# Patient Record
Sex: FEMALE | Race: WHITE | NOT HISPANIC OR LATINO | Employment: FULL TIME | URBAN - METROPOLITAN AREA
[De-identification: names, ages, dates, MRNs, and addresses within clinical notes are randomized per-mention and may not be internally consistent; named-entity substitution may affect disease eponyms.]

---

## 2017-02-01 DIAGNOSIS — F90.8 ATTENTION-DEFICIT HYPERACTIVITY DISORDER, OTHER TYPE: Primary | ICD-10-CM

## 2017-02-01 RX ORDER — DEXTROAMPHETAMINE SACCHARATE, AMPHETAMINE ASPARTATE MONOHYDRATE, DEXTROAMPHETAMINE SULFATE AND AMPHETAMINE SULFATE 6.25; 6.25; 6.25; 6.25 MG/1; MG/1; MG/1; MG/1
25 CAPSULE, EXTENDED RELEASE ORAL EVERY MORNING
Qty: 30 CAPSULE | Refills: 0 | Status: SHIPPED | OUTPATIENT
Start: 2017-02-01 | End: 2017-02-02

## 2017-02-01 NOTE — TELEPHONE ENCOUNTER
Pt calls requesting refill of Adderall.    Adderall 25 mg      Last Written Prescription Date:  12/27/16  Last Fill Quantity: 30,   # refills: 0  Last Office Visit with [facility] or  Health prescribing provider: 9/15/16  Future Office visit:       Routing refill request to provider for review/approval because:  Drug not on the [facility] or  Health refill protocol or controlled substance

## 2017-02-02 RX ORDER — DEXTROAMPHETAMINE SACCHARATE, AMPHETAMINE ASPARTATE MONOHYDRATE, DEXTROAMPHETAMINE SULFATE AND AMPHETAMINE SULFATE 6.25; 6.25; 6.25; 6.25 MG/1; MG/1; MG/1; MG/1
25 CAPSULE, EXTENDED RELEASE ORAL EVERY MORNING
Qty: 30 CAPSULE | Refills: 0 | Status: SHIPPED | OUTPATIENT
Start: 2017-02-02 | End: 2017-03-02

## 2017-02-02 NOTE — TELEPHONE ENCOUNTER
Unable to locate rx that was printed 2-1-17.  Dr. Bates re-printed one and will be hand carried to  pharm after 4pm today.    Pt informed.

## 2017-03-01 DIAGNOSIS — F90.8 ATTENTION-DEFICIT HYPERACTIVITY DISORDER, OTHER TYPE: ICD-10-CM

## 2017-03-01 NOTE — TELEPHONE ENCOUNTER
Reason for Call:  Medication or medication refill:    Do you use a [facility]?  Name of the pharmacy and phone number for the current request:  [facility] [address] ([place]) - [phone]    Name of the medication requested: adderal    Other request: Monday will need by Monday    Can we leave a detailed message on this number? YES    Phone number patient can be reached at: Home number on file [phone] (home)    Best Time: any    Call taken on 3/1/2017 at 10:00 AM by Sydnie Casiano

## 2017-03-02 RX ORDER — DEXTROAMPHETAMINE SACCHARATE, AMPHETAMINE ASPARTATE MONOHYDRATE, DEXTROAMPHETAMINE SULFATE AND AMPHETAMINE SULFATE 6.25; 6.25; 6.25; 6.25 MG/1; MG/1; MG/1; MG/1
25 CAPSULE, EXTENDED RELEASE ORAL EVERY MORNING
Qty: 30 CAPSULE | Refills: 0 | Status: SHIPPED | OUTPATIENT
Start: 2017-03-02 | End: 2017-03-30

## 2017-03-02 NOTE — TELEPHONE ENCOUNTER
Adderall      Last Written Prescription Date:  2-2-17  Last Fill Quantity: 30,   # refills: 0  Last Office Visit with [facility] or  Health prescribing provider: 9-15-16  Future Office visit:       Routing refill request to provider for review/approval because:  Drug not on the [facility], [facility] or [facility] refill protocol or controlled substance    Signed CSA form in chart.    [facility].    Please advise, thanks.

## 2017-03-02 NOTE — TELEPHONE ENCOUNTER
Pt calls, left vm checking on status of below.    Rx done.    Called pt, relay rx to [facility].    [facility].

## 2017-03-09 DIAGNOSIS — F41.8 DEPRESSION WITH ANXIETY: ICD-10-CM

## 2017-03-09 RX ORDER — VENLAFAXINE HYDROCHLORIDE 75 MG/1
225 CAPSULE, EXTENDED RELEASE ORAL DAILY
Qty: 270 CAPSULE | Refills: 0 | Status: SHIPPED | OUTPATIENT
Start: 2017-03-09 | End: 2017-06-05

## 2017-03-09 NOTE — TELEPHONE ENCOUNTER
Venlafaxine    Last Written Prescription Date: 9/15/16  Last Fill Quantity: 270, # refills: 1  Last Office Visit with [facility] or [facility] prescribing provider: 9/15/16        BP Readings from Last 3 Encounters:   09/29/16 100/80   09/15/16 108/78   05/15/16 132/83     Pulse: (for F[place]ima)  Creatinine   Date Value Ref Range Status   06/18/2015 0.64 0.52 - 1.04 mg/dL Final   ]    Last PHQ-9 score on record=   PHQ-9 SCORE 9/15/2016   Total Score -   Total Score 6         Labs showing if normal/abnormal  Data Unavailable  Lab Results   Component Value Date    AST 19 06/18/2015    ALT 31 06/18/2015      No results found for: CHOL, TRIG, HDL, LDL, VLDL, CHOLHDLRATIO

## 2017-03-09 NOTE — TELEPHONE ENCOUNTER
Pt callin back, updated PHQ-9. Score of 4.  Appt scheduled for 3/20/17 at 9:20am.    Medication is being filled for 1 time refill only due to:  Patient needs to be seen because due for 6 month follow up..

## 2017-03-10 ASSESSMENT — PATIENT HEALTH QUESTIONNAIRE - PHQ9: SUM OF ALL RESPONSES TO PHQ QUESTIONS 1-9: 4

## 2017-03-30 DIAGNOSIS — F90.8 ATTENTION-DEFICIT HYPERACTIVITY DISORDER, OTHER TYPE: ICD-10-CM

## 2017-03-30 RX ORDER — DEXTROAMPHETAMINE SACCHARATE, AMPHETAMINE ASPARTATE MONOHYDRATE, DEXTROAMPHETAMINE SULFATE AND AMPHETAMINE SULFATE 6.25; 6.25; 6.25; 6.25 MG/1; MG/1; MG/1; MG/1
25 CAPSULE, EXTENDED RELEASE ORAL EVERY MORNING
Qty: 30 CAPSULE | Refills: 0 | Status: SHIPPED | OUTPATIENT
Start: 2017-03-30 | End: 2017-04-27

## 2017-03-30 NOTE — TELEPHONE ENCOUNTER
Adderall      Last Written Prescription Date:  3-2-17  Last Fill Quantity: 30,   # refills: 0  Last Office Visit with [facility] or  Health prescribing provider: 9-29-16  Future Office visit:       Routing refill request to provider for review/approval because:  Drug not on the [facility], [facility] or [facility] refill protocol or controlled substance    Signed CSA form in chart.    Please [facility] to [facility].    Please advise, thanks.

## 2017-03-30 NOTE — TELEPHONE ENCOUNTER
Reason for Call:  Medication or medication refill:    Do you use a [facility]?  Name of the pharmacy and phone number for the current request:  [facility] [address] ([place]) - [phone]    Name of the medication requested: adderall    Other request: none    Can we leave a detailed message on this number? YES    Phone number patient can be reached at: Home number on file [phone] (home)    Best Time: any    Call taken on 3/30/2017 at 11:27 AM by Nano Esparza

## 2017-04-27 DIAGNOSIS — F90.8 ATTENTION-DEFICIT HYPERACTIVITY DISORDER, OTHER TYPE: ICD-10-CM

## 2017-04-27 NOTE — TELEPHONE ENCOUNTER
Reason for Call:  Medication or medication refill:    Do you use a [facility]?  Name of the pharmacy and phone number for the current request:  [facility] [address] ([place]) - [phone]    Name of the medication requested: adderal    Other request: none    Can we leave a detailed message on this number? YES    Phone number patient can be reached at: Home number on file [phone] (home)    Best Time: any    Call taken on 4/27/2017 at 2:41 PM by Sydnie Casiano

## 2017-04-28 RX ORDER — DEXTROAMPHETAMINE SACCHARATE, AMPHETAMINE ASPARTATE MONOHYDRATE, DEXTROAMPHETAMINE SULFATE AND AMPHETAMINE SULFATE 6.25; 6.25; 6.25; 6.25 MG/1; MG/1; MG/1; MG/1
25 CAPSULE, EXTENDED RELEASE ORAL EVERY MORNING
Qty: 30 CAPSULE | Refills: 0 | Status: SHIPPED | OUTPATIENT
Start: 2017-04-28 | End: 2017-05-30

## 2017-05-01 NOTE — TELEPHONE ENCOUNTER
Pt calls to check if refill has been taken to the pharmacy. Informed pt prescription was taken to the pharmacy on Friday.

## 2017-05-04 ENCOUNTER — HOSPITAL ENCOUNTER (EMERGENCY)
Facility: CLINIC | Age: 27
Discharge: HOME | End: 2017-05-04
Attending: EMERGENCY MEDICINE | Admitting: EMERGENCY MEDICINE
Payer: COMMERCIAL

## 2017-05-04 ENCOUNTER — APPOINTMENT (OUTPATIENT)
Dept: GENERAL RADIOLOGY | Facility: CLINIC | Age: 27
End: 2017-05-04
Attending: EMERGENCY MEDICINE
Payer: COMMERCIAL

## 2017-05-04 VITALS
SYSTOLIC BLOOD PRESSURE: 130 MMHG | TEMPERATURE: 97.7 F | HEIGHT: 67 IN | HEART RATE: 114 BPM | RESPIRATION RATE: 16 BRPM | OXYGEN SATURATION: 100 % | DIASTOLIC BLOOD PRESSURE: 88 MMHG | BODY MASS INDEX: 25.11 KG/M2 | WEIGHT: 160 LBS

## 2017-05-04 DIAGNOSIS — F45.8 HYPERVENTILATION SYNDROME: ICD-10-CM

## 2017-05-04 DIAGNOSIS — F41.0 PANIC ATTACK: ICD-10-CM

## 2017-05-04 LAB
ANION GAP SERPL CALCULATED.3IONS-SCNC: 7 MMOL/L (ref 3–14)
BASOPHILS # BLD AUTO: 0 10E9/L (ref 0–0.2)
BASOPHILS NFR BLD AUTO: 0.4 %
BUN SERPL-MCNC: 11 MG/DL (ref 7–30)
CALCIUM SERPL-MCNC: 8.8 MG/DL (ref 8.5–10.1)
CHLORIDE SERPL-SCNC: 104 MMOL/L (ref 94–109)
CO2 SERPL-SCNC: 27 MMOL/L (ref 20–32)
CREAT SERPL-MCNC: 0.68 MG/DL (ref 0.52–1.04)
D DIMER PPP FEU-MCNC: 0.4 UG/ML FEU (ref 0–0.5)
DIFFERENTIAL METHOD BLD: NORMAL
EOSINOPHIL # BLD AUTO: 0.3 10E9/L (ref 0–0.7)
EOSINOPHIL NFR BLD AUTO: 3.5 %
ERYTHROCYTE [DISTWIDTH] IN BLOOD BY AUTOMATED COUNT: 12.4 % (ref 10–15)
GFR SERPL CREATININE-BSD FRML MDRD: NORMAL ML/MIN/1.7M2
GLUCOSE SERPL-MCNC: 94 MG/DL (ref 70–99)
HCG SERPL QL: NEGATIVE
HCT VFR BLD AUTO: 42.5 % (ref 35–47)
HGB BLD-MCNC: 15.1 G/DL (ref 11.7–15.7)
IMM GRANULOCYTES # BLD: 0 10E9/L (ref 0–0.4)
IMM GRANULOCYTES NFR BLD: 0.3 %
INTERPRETATION ECG - MUSE: NORMAL
LYMPHOCYTES # BLD AUTO: 1.2 10E9/L (ref 0.8–5.3)
LYMPHOCYTES NFR BLD AUTO: 16.4 %
MCH RBC QN AUTO: 32.6 PG (ref 26.5–33)
MCHC RBC AUTO-ENTMCNC: 35.5 G/DL (ref 31.5–36.5)
MCV RBC AUTO: 92 FL (ref 78–100)
MONOCYTES # BLD AUTO: 0.5 10E9/L (ref 0–1.3)
MONOCYTES NFR BLD AUTO: 6.6 %
NEUTROPHILS # BLD AUTO: 5.4 10E9/L (ref 1.6–8.3)
NEUTROPHILS NFR BLD AUTO: 72.8 %
NRBC # BLD AUTO: 0 10*3/UL
NRBC BLD AUTO-RTO: 0 /100
PLATELET # BLD AUTO: 227 10E9/L (ref 150–450)
POTASSIUM SERPL-SCNC: 3.7 MMOL/L (ref 3.4–5.3)
RBC # BLD AUTO: 4.63 10E12/L (ref 3.8–5.2)
SODIUM SERPL-SCNC: 138 MMOL/L (ref 133–144)
TROPONIN I SERPL-MCNC: NORMAL UG/L (ref 0–0.04)
WBC # BLD AUTO: 7.5 10E9/L (ref 4–11)

## 2017-05-04 PROCEDURE — 93005 ELECTROCARDIOGRAM TRACING: CPT

## 2017-05-04 PROCEDURE — 80048 BASIC METABOLIC PNL TOTAL CA: CPT | Performed by: EMERGENCY MEDICINE

## 2017-05-04 PROCEDURE — 99285 EMERGENCY DEPT VISIT HI MDM: CPT | Mod: 25

## 2017-05-04 PROCEDURE — 85379 FIBRIN DEGRADATION QUANT: CPT | Performed by: EMERGENCY MEDICINE

## 2017-05-04 PROCEDURE — 84484 ASSAY OF TROPONIN QUANT: CPT | Performed by: EMERGENCY MEDICINE

## 2017-05-04 PROCEDURE — 84703 CHORIONIC GONADOTROPIN ASSAY: CPT | Performed by: EMERGENCY MEDICINE

## 2017-05-04 PROCEDURE — 85025 COMPLETE CBC W/AUTO DIFF WBC: CPT | Performed by: EMERGENCY MEDICINE

## 2017-05-04 PROCEDURE — 71020 XR CHEST 2 VW: CPT

## 2017-05-04 ASSESSMENT — ENCOUNTER SYMPTOMS
DIAPHORESIS: 1
NUMBNESS: 1
VOMITING: 0
NAUSEA: 1
NERVOUS/ANXIOUS: 1
SHORTNESS OF BREATH: 1

## 2017-05-04 NOTE — ED AVS SNAPSHOT
Emergency Department    [address] [place] [address]    Phone:  [phone]    Fax:  [phone]                                       Tessa Barajas   MRN: [id number]    Department:   Emergency Department   Date of Visit:  5/4/2017           After Visit Summary Signature Page     I have received my discharge instructions, and my questions have been answered. I have discussed any challenges I see with this plan with the nurse or doctor.    ..........................................................................................................................................  Patient/Patient Representative Signature      ..........................................................................................................................................  Patient Representative Print Name and Relationship to Patient    ..................................................               ................................................  Date                                            Time    ..........................................................................................................................................  Reviewed by Signature/Title    ...................................................              ..............................................  Date                                                            Time

## 2017-05-04 NOTE — DISCHARGE INSTRUCTIONS

## 2017-05-04 NOTE — ED AVS SNAPSHOT
Emergency Department    [address] [facility] [address]    Phone:  [phone]    Fax:  [phone]                                       Tessa Barajas   MRN: [id number]    Department:   Emergency Department   Date of Visit:  5/4/2017           Patient Information     Date Of Birth          1990        Your diagnoses for this visit were:     Panic attack     Hyperventilation syndrome        You were seen by Zack Tong MD.      Follow-up Information     Schedule an appointment as soon as possible for a visit with Hemalatha Nino MD.    Specialty:  Internal Medicine    Contact information:    [facility]  [address]  [facility] [address] [phone]          Follow up with  Emergency Department.    Specialty:  EMERGENCY MEDICINE    Why:  If symptoms worsen    Contact information:    [address] [facility] [id number] [phone]        Discharge Instructions         Anxiety Reaction  Anxiety is the feeling we all get when we think something bad might happen. It is a normal response to stress and usually causes only a mild reaction. When anxiety becomes more severe, it can interfere with daily life. In some cases, you may not even be aware of what it is you re anxious about. There may also be a genetic link or it may be a learned behavior in the home.  Both psychological and physical triggers cause stress reaction. It's often a response to fear or emotional stress, real or imagined. This stress may come from home, family, work, or social relationships.  During an anxiety reaction, you may feel:    Helpless    Nervous    Depressed    Irritable  Your body may show signs of anxiety in many ways. You may experience:    Dry mouth    Shakiness    Dizziness    Weakness    Trouble breathing    Breathing fast (hyperventilating)    Chest pressure    Sweating    Headache    Nausea    Diarrhea    Tiredness    Inability to sleep    Sexual  problems  Home care    Try to locate the sources of stress in your life. They may not be obvious. These may include:    Daily hassles of life (traffic jams, missed appointments, car troubles, etc.)    Major life changes, both good (new baby, job promotion) and bad (loss of job, loss of loved one)    Overload: feeling that you have too many responsibilities and can't take care of all of them at once    Feeling helpless, feeling that your problems are beyond what you re able to solve    Notice how your body reacts to stress. Learn to listen to your body signals. This will help you take action before the stress becomes severe.    When you can, do something about the source of your stress. (Avoid hassles, limit the amount of change that happens in your life at one time and take a break when you feel overloaded).    Unfortunately, many stressful situations can't be avoided. It is necessary to learn how to better manage stress. There are many proven methods that will reduce your anxiety. These include simple things like exercise, good nutrition and adequate rest. Also, there are certain techniques that are helpful:    Relaxation    Breathing exercises    Visualization    Biofeedback    Meditation  For more information about this, consult your doctor or go to a local bookstore and review the many books and tapes available on this subject.  Follow-up care  If you feel that your anxiety is not responding to self-help measures, contact your doctor or make an appointment with a counselor. You may need short-term psychological counseling and temporary medicine to help you manage stress.  Call 911  Call your healthcare provider right away if any of these occur:    Trouble breathing    Confusion    Drowsiness or trouble wakening    Fainting or loss of consciousness    Rapid heart rate    Seizure    New chest pain that becomes more severe, lasts longer, or spreads into your shoulder, arm, neck, jaw, or back  When to seek medical  advice  Call your healthcare provider right away if any of these occur:    Your symptoms get worse    Severe headache not relieved by rest and mild pain reliever    0291-4817 [employer]. [address] [place] [address], [place] [address]. All rights reserved. This information is not intended as a substitute for professional medical care. Always follow your healthcare professional's instructions.          Discharge References/Attachments     HYPERVENTILATION SYNDROME (ENGLISH)      24 Hour Appointment Hotline       To make an appointment at [facility], call [phone] ([phone]). If you don't have a family doctor or clinic, we will help you find one. [facility] are conveniently located to serve the needs of you and your family.             Review of your medicines      Our records show that you are taking the medicines listed below. If these are incorrect, please call your family doctor or clinic.        Dose / Directions Last dose taken    amphetamine-dextroamphetamine 25 MG 24 hr capsule   Commonly known as:  ADDERALL XR   Dose:  25 mg   Quantity:  30 capsule        Take 1 capsule (25 mg) by mouth every morning   Refills:  0        MIRENA (52 MG) 20 MCG/24HR IUD   Dose:  1 each   Generic drug:  levonorgestrel        1 each by Intrauterine route once.   Refills:  0        venlafaxine 75 MG 24 hr capsule   Commonly known as:  EFFEXOR-XR   Dose:  225 mg   Quantity:  270 capsule        Take 3 capsules (225 mg) by mouth daily   Refills:  0                Procedures and tests performed during your visit     Basic metabolic panel    CBC with platelets differential    D dimer quantitative    HCG qualitative    Peripheral IV catheter    Troponin I    XR Chest 2 Views      Orders Needing Specimen Collection     None      Pending Results     Date and Time Order Name Status Description    5/4/2017 0946 XR Chest 2 Views Preliminary             Pending Culture Results     No orders found from  5/2/2017 to 5/5/2017.            Pending Results Instructions     If you had any lab results that were not finalized at the time of your Discharge, you can call the ED Lab Result RN at [phone]. You will be contacted by this team for any positive Lab results or changes in treatment. The nurses are available 7 days a week from 10A to 6:30P.  You can leave a message 24 hours per day and they will return your call.        Test Results From Your Hospital Stay        5/4/2017 10:08 AM      Component Results     Component Value Ref Range & Units Status    WBC 7.5 4.0 - 11.0 10e9/L Final    RBC Count 4.63 3.8 - 5.2 10e12/L Final    Hemoglobin 15.1 11.7 - 15.7 g/dL Final    Hematocrit 42.5 35.0 - 47.0 % Final    MCV 92 78 - 100 fl Final    MCH 32.6 26.5 - 33.0 pg Final    MCHC 35.5 31.5 - 36.5 g/dL Final    RDW 12.4 10.0 - 15.0 % Final    Platelet Count 227 150 - 450 10e9/L Final    Diff Method Automated Method  Final    % Neutrophils 72.8 % Final    % Lymphocytes 16.4 % Final    % Monocytes 6.6 % Final    % Eosinophils 3.5 % Final    % Basophils 0.4 % Final    % Immature Granulocytes 0.3 % Final    Nucleated RBCs 0 0 /100 Final    Absolute Neutrophil 5.4 1.6 - 8.3 10e9/L Final    Absolute Lymphocytes 1.2 0.8 - 5.3 10e9/L Final    Absolute Monocytes 0.5 0.0 - 1.3 10e9/L Final    Absolute Eosinophils 0.3 0.0 - 0.7 10e9/L Final    Absolute Basophils 0.0 0.0 - 0.2 10e9/L Final    Abs Immature Granulocytes 0.0 0 - 0.4 10e9/L Final    Absolute Nucleated RBC 0.0  Final         5/4/2017 10:21 AM      Component Results     Component Value Ref Range & Units Status    D Dimer 0.4 0.0 - 0.50 ug/ml FEU Final    This D-dimer assay is intended for use in conjuntion with a clinical pretest   probability assessment model to exclude pulmonary embolism (PE) and as an aid   in the diagnosis of deep venous thrombosis (DVT) in outpatients suspected of   PE   or DVT. The cut-off value is 0.5 g/mL FEU.           5/4/2017 10:23 AM       Component Results     Component Value Ref Range & Units Status    Sodium 138 133 - 144 mmol/L Final    Potassium 3.7 3.4 - 5.3 mmol/L Final    Chloride 104 94 - 109 mmol/L Final    Carbon Dioxide 27 20 - 32 mmol/L Final    Anion Gap 7 3 - 14 mmol/L Final    Glucose 94 70 - 99 mg/dL Final    Urea Nitrogen 11 7 - 30 mg/dL Final    Creatinine 0.68 0.52 - 1.04 mg/dL Final    GFR Estimate >90  Non  GFR Calc   >60 mL/min/1.7m2 Final    GFR Estimate If Black >90   GFR Calc   >60 mL/min/1.7m2 Final    Calcium 8.8 8.5 - 10.1 mg/dL Final         5/4/2017 10:28 AM      Component Results     Component Value Ref Range & Units Status    Troponin I ES  0.000 - 0.045 ug/L Final    <0.015  The 99th percentile for upper reference range is 0.045 ug/L.  Troponin values in   the range of 0.045 - 0.120 ug/L may be associated with risks of adverse   clinical events.           5/4/2017 10:25 AM      Component Results     Component Value Ref Range & Units Status    HCG Qualitative Serum Negative NEG Final         5/4/2017 10:20 AM      Narrative     CHEST TWO VIEWS  5/4/2017 10:01 AM      HISTORY:  Chest pain.     COMPARISON: None.    FINDINGS: The heart size is normal. The lungs are clear. No  pneumothorax or pleural effusion.         Impression     IMPRESSION:  No acute abnormality.                Clinical Quality Measure: Blood Pressure Screening     Your blood pressure was checked while you were in the emergency department today. The last reading we obtained was  BP: (!) 135/96 . Please read the guidelines below about what these numbers mean and what you should do about them.  If your systolic blood pressure (the top number) is less than 120 and your diastolic blood pressure (the bottom number) is less than 80, then your blood pressure is normal. There is nothing more that you need to do about it.  If your systolic blood pressure (the top number) is 120-139 or your diastolic blood pressure (the bottom  "number) is 80-89, your blood pressure may be higher than it should be. You should have your blood pressure rechecked within a year by a primary care provider.  If your systolic blood pressure (the top number) is 140 or greater or your diastolic blood pressure (the bottom number) is 90 or greater, you may have high blood pressure. High blood pressure is treatable, but if left untreated over time it can put you at risk for heart attack, stroke, or kidney failure. You should have your blood pressure rechecked by a primary care provider within the next 4 weeks.  If your provider in the emergency department today gave you specific instructions to follow-up with your doctor or provider even sooner than that, you should follow that instruction and not wait for up to 4 weeks for your follow-up visit.        Thank you for choosing [facility]       Thank you for choosing [facility] for your care. Our goal is always to provide you with excellent care. Hearing back from our patients is one way we can continue to improve our services. Please take a few minutes to complete the written survey that you may receive in the mail after you visit with us. Thank you!        [facility] Information     [facility] lets you send messages to your doctor, view your test results, renew your prescriptions, schedule appointments and more. To sign up, go to [email or web address] . Click on \"Log in\" on the left side of the screen, which will take you to the Welcome page. Then click on \"Sign up Now\" on the right side of the page.     You will be asked to enter the access code listed below, as well as some personal information. Please follow the directions to create your username and password.     Your access code is: [email or web address]  Expires: 2017 10:51 AM     Your access code will  in 90 days. If you need help or a new code, please call your [facility] clinic or [phone].        Care EveryWhere ID     This is your Care EveryWhere ID. This could be " used by other organizations to access your [facility] medical records  [id number]        After Visit Summary       This is your record. Keep this with you and show to your community pharmacist(s) and doctor(s) at your next visit.

## 2017-05-04 NOTE — ED PROVIDER NOTES
"  History     Chief Complaint:  Shortness of Breath    HPI   Tessa Barajas is a 26 year old female with a history of anxiety who presents with concern for shortness of breath. The patient reports that while at the gas station today she suddenly began feeling hot, sweaty, nauseous, and short of breath and developed numbness and tingling in her bilateral hands and feet. She felt like her hands and feet were locking up and she became markedly anxious because she did not know what was happening. Here, she reports that her symptoms have significantly improved but she is still feeling somewhat shaky and tingly. She endorses experiencing similar symptoms a couple of years ago for which she was seen in an emergency department and \"they didn't really do anything.\" The patient denies any chest pain or vomiting.     Cardiac Risk Factors:  CAD:    Neg  Hypertension:   Neg  Hyperlipidemia:  Pos  Diabetes:   Neg  Tobacco use:   Pos  Gender:   F  Age:    26  Familial Hx of CAD:  Neg    PE/DVT Risk Factors:   Hx of PE/DVT:   Neg  Hx of clotting disorder:  Neg  Hx of cancer:    Neg  Tobacco use:    Pos  Hormone therapy:   Pos  Pregnancy:    Neg  Prolonged immobilization:  Neg  Recent surgery:   Neg  Recent travel:    Neg  Familial Hx of PE/DVT:  Neg      Allergies:  Amoxicillin  Cefzil     Medications:    Effexor  Adderall  Mirena IUD    Past Medical History:    Headache disorder  ADHD  Depression with anxiety  HLD    Past Surgical History:    Abdomen surgery (had quarter removed)  Tooth extraction    Family History:    History reviewed.  No significant family history.      Social History:  Relationship status: Single  Tobacco use: Negative  Alcohol use: Positive (occasionally, less than once per week)  The patient presents with her mother.     Review of Systems   Constitutional: Positive for diaphoresis.   Respiratory: Positive for shortness of breath.    Cardiovascular: Negative for chest pain.   Gastrointestinal: Positive for " "nausea. Negative for vomiting.   Neurological: Positive for numbness.        Positive for paresthesias.    Psychiatric/Behavioral: The patient is nervous/anxious.    All other systems reviewed and are negative.    Physical Exam   First Vitals:  BP: (!) 135/96  Pulse: 114  Temp: 97.7  F (36.5  C)  Resp: 20  Height: 170.2 cm (5' 7\")  Weight: 72.6 kg (160 lb)  SpO2: 100 %    Physical Exam  Nursing note and vitals reviewed.  Constitutional:  Oriented to person, place, and time. Cooperative.   HENT:   Nose:    Nose normal.   Mouth/Throat:   Mucous membranes are normal.   Eyes:    Conjunctivae normal and EOM are normal.      Pupils are equal, round, and reactive to light.   Neck:    Trachea normal.   Cardiovascular:  Tachycardic rate, regular rhythm, normal heart sounds and normal pulses.      No murmur heard.  Pulmonary/Chest:  Effort normal and breath sounds normal.   Abdominal:   Soft. Normal appearance and bowel sounds are normal.      There is no tenderness.      There is no rebound and no CVA tenderness.   Musculoskeletal:  Extremities atraumatic x 4.   Lymphadenopathy:  No cervical adenopathy.   Neurological:   Alert and oriented to person, place, and time. Normal strength.      No cranial nerve deficit or sensory deficit. GCS eye subscore is 4. GCS verbal      subscore is 5. GCS motor subscore is 6.   Skin:    Skin is intact. No rash noted.   Psychiatric:   Anxious.     Emergency Department Course   ECG (10:06:40):  Indication: Shortness of breath  Rate 105 bpm. IN interval 146. QRS duration 92. QT/QTc 356/470. P-R-T axes 56.   Interpretation: Sinus tachycardia, Nonspecific T wave abnormality, Abnormal ECG.  Agree with computer interpretation.   Interpreted at 1042 by Dr. Tong.      Imaging:  Radiographic findings were communicated with the patient who voiced understanding of the findings.    Chest XR, per radiology:  No acute abnormality.     Laboratory:  CBC: WNL (WBC 7.5, HGB 15.1, )  BMP: WNL " (Creatinine 0.68)  HCG Qualitative serum: Negative  D dimer: 0.4  0955: Troponin I: <0.015     Emergency Department Course:  Nursing notes and vitals reviewed.  I performed an exam of the patient as documented above.  The above workup was undertaken.  1053: I rechecked the patient and discussed results.  Findings and plan explained to the Patient and mother. Patient discharged home, status improved, with instructions regarding supportive care, medications, and reasons to return as well as the importance of close follow-up was reviewed.     Impression & Plan    Medical Decision Making:  Tessa Barajas is a 26 year old female who came in for what sounds like a hyperventilation syndrome and panic attack. Her symptoms were very classic for hyperventilation. I felt it was reasonable though to proceed with the above workup to make sure she did not have anything more serious and specifically a pulmonary embolism. I was concerned due to the fact that she is on birth control and smokes. However, she has no other risk factors and has essentially a normal exam here other than being slightly tachycardic and anxious. Her workup here is unremarkable, and therefore I think she is safe for discharge and outpatient management. I recommended she follow up with her primary MD and return with any concerns or worsening symptoms.     Diagnosis:    ICD-10-CM   1. Panic attack F41.0   2. Hyperventilation syndrome F45.8     Disposition:  Discharge to home with primary care follow up.       INeva, am serving as a scribe on 5/4/2017 at 9:41 AM to personally document services performed by Zack Tong MD, based on my observations and the provider's statements to me.     EMERGENCY DEPARTMENT     Zack Tong MD  05/04/17 2040

## 2017-05-30 ENCOUNTER — TELEPHONE (OUTPATIENT)
Dept: INTERNAL MEDICINE | Facility: CLINIC | Age: 27
End: 2017-05-30

## 2017-05-30 DIAGNOSIS — F90.8 ATTENTION-DEFICIT HYPERACTIVITY DISORDER, OTHER TYPE: ICD-10-CM

## 2017-05-30 RX ORDER — DEXTROAMPHETAMINE SACCHARATE, AMPHETAMINE ASPARTATE MONOHYDRATE, DEXTROAMPHETAMINE SULFATE AND AMPHETAMINE SULFATE 6.25; 6.25; 6.25; 6.25 MG/1; MG/1; MG/1; MG/1
25 CAPSULE, EXTENDED RELEASE ORAL EVERY MORNING
Qty: 30 CAPSULE | Refills: 0 | Status: SHIPPED | OUTPATIENT
Start: 2017-05-30 | End: 2017-06-27

## 2017-05-30 NOTE — TELEPHONE ENCOUNTER
Reason for Call:  Medication or medication refill:    Do you use a [facility]?  Name of the pharmacy and phone number for the current request:  [facility] [address] ([place]) - [phone]    Name of the medication requested: adderall    Other request: none    Can we leave a detailed message on this number? YES    Phone number patient can be reached at: Home number on file [phone] (home)    Best Time: any    Call taken on 5/30/2017 at 8:09 AM by Sydnie Casiano

## 2017-05-30 NOTE — TELEPHONE ENCOUNTER
Contacted pt, informed her she is due for appt. Appt scheduled for 6/16.      Adderall XR 25mg      Last Written Prescription Date:  4/28/17  Last Fill Quantity: 30,   # refills: 0  Last [facility] Visit with [facility] prescribing provider: 9/29/16  Future Office visit:     Next 5 appointments (look out 90 days)     Jun 16, 2017  4:00 PM CDT   Office Visit with Hemalatha Nino MD   [facility] ([facility])    [address]  [facility] [id number] [phone]                   CSA on file.   Walk to [facility], notify pt when dropped off.     Routing refill request to provider for review/approval because:  Drug not on the [facility] refill protocol or controlled substance

## 2017-06-05 DIAGNOSIS — F41.8 DEPRESSION WITH ANXIETY: ICD-10-CM

## 2017-06-05 RX ORDER — VENLAFAXINE HYDROCHLORIDE 75 MG/1
225 CAPSULE, EXTENDED RELEASE ORAL DAILY
Qty: 90 CAPSULE | Refills: 0 | Status: SHIPPED | OUTPATIENT
Start: 2017-06-05 | End: 2017-06-27

## 2017-06-05 NOTE — TELEPHONE ENCOUNTER
Pt called. Stated she is sched to see Trung on 6/16, but is out of her Venlafaxine. Needs refill to get her thru til appt      Rx for 1mo sent to pharm

## 2017-06-27 ENCOUNTER — OFFICE VISIT (OUTPATIENT)
Dept: INTERNAL MEDICINE | Facility: CLINIC | Age: 27
End: 2017-06-27
Payer: COMMERCIAL

## 2017-06-27 VITALS
BODY MASS INDEX: 26.1 KG/M2 | HEIGHT: 67 IN | DIASTOLIC BLOOD PRESSURE: 70 MMHG | OXYGEN SATURATION: 100 % | SYSTOLIC BLOOD PRESSURE: 122 MMHG | WEIGHT: 166.3 LBS | TEMPERATURE: 98.3 F | HEART RATE: 71 BPM

## 2017-06-27 DIAGNOSIS — F41.8 DEPRESSION WITH ANXIETY: ICD-10-CM

## 2017-06-27 DIAGNOSIS — F90.8 ATTENTION-DEFICIT HYPERACTIVITY DISORDER, OTHER TYPE: ICD-10-CM

## 2017-06-27 PROCEDURE — 99213 OFFICE O/P EST LOW 20 MIN: CPT | Performed by: FAMILY MEDICINE

## 2017-06-27 RX ORDER — VENLAFAXINE HYDROCHLORIDE 75 MG/1
225 CAPSULE, EXTENDED RELEASE ORAL DAILY
Qty: 90 CAPSULE | Refills: 5 | Status: SHIPPED | OUTPATIENT
Start: 2017-06-27 | End: 2017-08-07

## 2017-06-27 RX ORDER — DEXTROAMPHETAMINE SACCHARATE, AMPHETAMINE ASPARTATE MONOHYDRATE, DEXTROAMPHETAMINE SULFATE AND AMPHETAMINE SULFATE 6.25; 6.25; 6.25; 6.25 MG/1; MG/1; MG/1; MG/1
25 CAPSULE, EXTENDED RELEASE ORAL EVERY MORNING
Qty: 30 CAPSULE | Refills: 0 | Status: SHIPPED | OUTPATIENT
Start: 2017-06-27 | End: 2017-06-27

## 2017-06-27 RX ORDER — DEXTROAMPHETAMINE SACCHARATE, AMPHETAMINE ASPARTATE MONOHYDRATE, DEXTROAMPHETAMINE SULFATE AND AMPHETAMINE SULFATE 5; 5; 5; 5 MG/1; MG/1; MG/1; MG/1
20 CAPSULE, EXTENDED RELEASE ORAL DAILY
Qty: 30 CAPSULE | Refills: 0 | Status: SHIPPED | OUTPATIENT
Start: 2017-06-27 | End: 2017-07-26

## 2017-06-27 ASSESSMENT — ANXIETY QUESTIONNAIRES
3. WORRYING TOO MUCH ABOUT DIFFERENT THINGS: MORE THAN HALF THE DAYS
7. FEELING AFRAID AS IF SOMETHING AWFUL MIGHT HAPPEN: SEVERAL DAYS
1. FEELING NERVOUS, ANXIOUS, OR ON EDGE: SEVERAL DAYS
GAD7 TOTAL SCORE: 10
2. NOT BEING ABLE TO STOP OR CONTROL WORRYING: MORE THAN HALF THE DAYS
IF YOU CHECKED OFF ANY PROBLEMS ON THIS QUESTIONNAIRE, HOW DIFFICULT HAVE THESE PROBLEMS MADE IT FOR YOU TO DO YOUR WORK, TAKE CARE OF THINGS AT HOME, OR GET ALONG WITH OTHER PEOPLE: SOMEWHAT DIFFICULT
5. BEING SO RESTLESS THAT IT IS HARD TO SIT STILL: SEVERAL DAYS
6. BECOMING EASILY ANNOYED OR IRRITABLE: MORE THAN HALF THE DAYS

## 2017-06-27 ASSESSMENT — PATIENT HEALTH QUESTIONNAIRE - PHQ9: 5. POOR APPETITE OR OVEREATING: SEVERAL DAYS

## 2017-06-27 NOTE — NURSING NOTE
"Chief Complaint   Patient presents with     Refill Request     No concerns       Initial /70  Pulse 71  Temp 98.3  F (36.8  C) (Oral)  Ht 5' 7\" (1.702 m)  Wt 166 lb 4.8 oz (75.4 kg)  SpO2 100%  BMI 26.05 kg/m2 Estimated body mass index is 26.05 kg/(m^2) as calculated from the following:    Height as of this encounter: 5' 7\" (1.702 m).    Weight as of this encounter: 166 lb 4.8 oz (75.4 kg).  Medication Reconciliation: complete     Neva Helm CMA      "

## 2017-06-27 NOTE — MR AVS SNAPSHOT
After Visit Summary   6/27/2017    Tessa Barajas    MRN: [id number]           Patient Information     Date Of Birth          1990        Visit Information        Provider Department      6/27/2017 5:00 PM Tyrone Howard MD [facility]        Today's Diagnoses     Depression with anxiety        Attention-deficit hyperactivity disorder, other type           Follow-ups after your visit        Additional Services     MENTAL HEALTH REFERRAL       Your provider has referred you to: [facility]: [facility] [phone]   [email or web address]   *Referral from [facility] Primary Care Provider required - Consultation Model - medication management & future refills will be returned to [facility] PCP upon completion of evaluation  *Please call to schedule an appointment.    All scheduling is subject to the client's specific insurance plan & benefits, provider/location availability, and provider clinical specialities.  Please arrive 15 minutes early for your first appointment and bring your completed paperwork.    Please be aware that coverage of these services is subject to the terms and limitations of your health insurance plan.  Call member services at your health plan with any benefit or coverage questions.                  Who to contact     If you have questions or need follow up information about today's clinic visit or your schedule please contact [facility] directly at [phone].  Normal or non-critical lab and imaging results will be communicated to you by MyChart, letter or phone within 4 business days after the clinic has received the results. If you do not hear from us within 7 days, please contact the clinic through MyChart or phone. If you have a critical or abnormal lab result, we will notify you by phone as soon as possible.  Submit refill requests through MyChart or call  "your pharmacy and they will forward the refill request to us. Please allow 3 business days for your refill to be completed.          Additional Information About Your Visit        [email or web address] Information     [email or web address] lets you send messages to your doctor, view your test results, renew your prescriptions, schedule appointments and more. To sign up, go to [email or web address] . Click on \"Log in\" on the left side of the screen, which will take you to the Welcome page. Then click on \"Sign up Now\" on the right side of the page.     You will be asked to enter the access code listed below, as well as some personal information. Please follow the directions to create your username and password.     Your access code is: [email or web address]  Expires: 2017 10:51 AM     Your access code will  in 90 days. If you need help or a new code, please call your [facility] clinic or [phone].        Care EveryWhere ID     This is your Care EveryWhere ID. This could be used by other organizations to access your [facility] medical records  [id number]        Your Vitals Were     Pulse Temperature Height Pulse Oximetry BMI (Body Mass Index)       71 98.3  F (36.8  C) (Oral) 5' 7\" (1.702 m) 100% 26.05 kg/m2        Blood Pressure from Last 3 Encounters:   17 122/70   17 130/88   16 100/80    Weight from Last 3 Encounters:   17 166 lb 4.8 oz (75.4 kg)   17 160 lb (72.6 kg)   16 157 lb (71.2 kg)              We Performed the Following     MENTAL HEALTH REFERRAL          Today's Medication Changes          These changes are accurate as of: 17  5:12 PM.  If you have any questions, ask your nurse or doctor.               Start taking these medicines.        Dose/Directions    amphetamine-dextroamphetamine 20 MG per 24 hr capsule   Commonly known as:  ADDERALL XR   Used for:  Attention-deficit hyperactivity disorder, other type   Started by:  Tyrone Howard MD        Dose:  20 mg   Take 1 capsule (20 " mg) by mouth daily   Quantity:  30 capsule   Refills:  0            Where to get your medicines      These medications were sent to [facility] [address] [place], [place] - [address] AT [facility] [address]  [address], [facility] [address]     Phone:  [phone]     venlafaxine 75 MG 24 hr capsule         Some of these will need a paper prescription and others can be bought over the counter.  Ask your nurse if you have questions.     Bring a paper prescription for each of these medications     amphetamine-dextroamphetamine 20 MG per 24 hr capsule                Primary Care Provider Office Phone # Fax #    Hemalatha Nino [phone]       [facility] [address] [facility] [address]        Equal Access to Services     MARICRUZ RICHARDS : Hadii corey viera hadasho Soomaali, waaxda luqadaha, qaybta kaalmada adeegyada, lauren monroy. So [facility] [phone].    ATENCIÓN: Si habla español, tiene a irizarry disposición servicios gratuitos de asistencia lingüística. Heidy al [phone].    We comply with applicable federal civil rights laws and [place] laws. We do not discriminate on the basis of race, color, national origin, age, disability sex, sexual orientation or gender identity.            Thank you!     Thank you for choosing [facility]  for your care. Our goal is always to provide you with excellent care. Hearing back from our patients is one way we can continue to improve our services. Please take a few minutes to complete the written survey that you may receive in the mail after your visit with us. Thank you!             Your Updated Medication List - Protect others around you: Learn how to safely use, store and throw away your medicines at www.disposemymeds.org.          This list is accurate as of: 6/27/17  5:12 PM.  Always use your most recent med list.                   Brand Name Dispense  Instructions for use Diagnosis    amphetamine-dextroamphetamine 20 MG per 24 hr capsule    ADDERALL XR    30 capsule    Take 1 capsule (20 mg) by mouth daily    Attention-deficit hyperactivity disorder, other type       MIRENA (52 MG) 20 MCG/24HR IUD   Generic drug:  levonorgestrel      1 each by Intrauterine route once.        venlafaxine 75 MG 24 hr capsule    EFFEXOR-XR    90 capsule    Take 3 capsules (225 mg) by mouth daily    Depression with anxiety

## 2017-06-27 NOTE — PROGRESS NOTES
"  SUBJECTIVE:                                                    Tessa Barajas is a 26 year old female who presents to clinic today for the following health issues:      Depression and Anxiety Follow-Up    Status since last visit: No change    Other associated symptoms:None    Complicating factors:     Significant life event: No     Current substance abuse: None    PHQ-9 SCORE 12/22/2015 9/15/2016 3/9/2017   Total Score - - -   Total Score 3 6 4     MARVIN-7 SCORE 5/6/2015 12/22/2015 9/15/2016   Total Score 1 - -   Total Score - 0 5       PHQ-9  English  PHQ-9   Any Language  GAD7    Amount of exercise or physical activity: 2-3 days/week for an average of 30-45 minutes    Problems taking medications regularly: No    Medication side effects: none    Diet: regular (no restrictions)      She has been on Effexor. She still is reporting problems with anxiety and occasional panic attack. She had an E R visit 5-4-17.    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 Feels the Adderall helps to concentrate while studying although would like to try reduced dose. No adverse effects except ? Anxiety.    Patient Active Problem List   Diagnosis     Headache disorder     Hyperlipidemia LDL goal <160     Normal pregnancy, first     Normal labor and delivery     Stye     Anxiety     Depression with anxiety     Attention-deficit hyperactivity disorder, other type       REVIEW OF SYSTEMS    Unremarkable except as above.      Past Medical History:   Diagnosis Date     Abnormal Pap smear 12/2011     Anxiety      Headache disorder        EXAM  /70  Pulse 71  Temp 98.3  F (36.8  C) (Oral)  Ht 5' 7\" (1.702 m)  Wt 166 lb 4.8 oz (75.4 kg)  SpO2 100%  BMI 26.05 kg/m2    Wt stable  Pleasant woman.  Doesn't appear unusually depressed or anxious today.    PHQ9 and GAD7 reviewed.      (F41.8) Depression with anxiety  Comment:   Discussed therapy.  Could consider change to Lexapro.  Plan: venlafaxine (EFFEXOR-XR) 75 MG 24 hr capsule,         " MENTAL HEALTH REFERRAL            (F90.8) Attention-deficit hyperactivity disorder, other type  Comment: reduced  Plan: amphetamine-dextroamphetamine (ADDERALL XR) 20         MG per 24 hr capsule, DISCONTINUED:         amphetamine-dextroamphetamine (ADDERALL XR) 25         MG 24 hr capsule

## 2017-06-28 ASSESSMENT — ANXIETY QUESTIONNAIRES: GAD7 TOTAL SCORE: 10

## 2017-06-28 ASSESSMENT — PATIENT HEALTH QUESTIONNAIRE - PHQ9: SUM OF ALL RESPONSES TO PHQ QUESTIONS 1-9: 7

## 2017-07-26 ENCOUNTER — TELEPHONE (OUTPATIENT)
Dept: INTERNAL MEDICINE | Facility: CLINIC | Age: 27
End: 2017-07-26

## 2017-07-26 DIAGNOSIS — F90.8 ATTENTION-DEFICIT HYPERACTIVITY DISORDER, OTHER TYPE: ICD-10-CM

## 2017-07-26 RX ORDER — DEXTROAMPHETAMINE SACCHARATE, AMPHETAMINE ASPARTATE MONOHYDRATE, DEXTROAMPHETAMINE SULFATE AND AMPHETAMINE SULFATE 5; 5; 5; 5 MG/1; MG/1; MG/1; MG/1
20 CAPSULE, EXTENDED RELEASE ORAL DAILY
Qty: 30 CAPSULE | Refills: 0 | Status: SHIPPED | OUTPATIENT
Start: 2017-07-26 | End: 2017-08-28

## 2017-07-26 NOTE — TELEPHONE ENCOUNTER
Reason for Call:  Medication or medication refill:    Do you use a [facility]?  Name of the pharmacy and phone number for the current request:  Pharmacy downstairs    Name of the medication requested: adderall     Other request: none     Can we leave a detailed message on this number? yes    Phone number patient can be reached at: Home number on file [phone] (home)    Best Time: asap     Call taken on 7/26/2017 at 9:20 AM by Lyssa Schofield

## 2017-07-26 NOTE — TELEPHONE ENCOUNTER
Reason for Call:  Medication or medication refill:    Do you use a [facility]?  Name of the pharmacy and phone number for the current request:  [facility]     Name of the medication requested: adderall     Can we leave a detailed message on this number?yes    Phone number patient can be reached at: Home number on file [phone] (home)    Best Time: asap    Call taken on 7/26/2017 at 9:54 AM by Lyssa Schofield

## 2017-07-26 NOTE — TELEPHONE ENCOUNTER
LM on pt's voicemail stating Rx was filled and walked down to [facility] to be filled.    Rx brought to [facility].  Shelly Lloyd CMA

## 2017-08-07 DIAGNOSIS — F41.8 DEPRESSION WITH ANXIETY: ICD-10-CM

## 2017-08-07 RX ORDER — VENLAFAXINE HYDROCHLORIDE 75 MG/1
225 CAPSULE, EXTENDED RELEASE ORAL DAILY
Qty: 270 CAPSULE | Refills: 0 | Status: SHIPPED | OUTPATIENT
Start: 2017-08-07 | End: 2017-10-17

## 2017-08-07 NOTE — TELEPHONE ENCOUNTER
Received faxed request for 90 day supply of Effexor from [place], [place].  Prescription approved per [facility] Refill Protocol.

## 2017-08-11 ENCOUNTER — OFFICE VISIT (OUTPATIENT)
Dept: PSYCHIATRY | Facility: CLINIC | Age: 27
End: 2017-08-11
Attending: FAMILY MEDICINE
Payer: COMMERCIAL

## 2017-08-11 VITALS
DIASTOLIC BLOOD PRESSURE: 80 MMHG | HEART RATE: 144 BPM | SYSTOLIC BLOOD PRESSURE: 120 MMHG | HEIGHT: 67 IN | BODY MASS INDEX: 26.68 KG/M2 | TEMPERATURE: 98.5 F | WEIGHT: 170 LBS | OXYGEN SATURATION: 97 %

## 2017-08-11 DIAGNOSIS — F41.0 PANIC DISORDER WITHOUT AGORAPHOBIA: ICD-10-CM

## 2017-08-11 DIAGNOSIS — F33.1 MAJOR DEPRESSIVE DISORDER, RECURRENT EPISODE, MODERATE (H): ICD-10-CM

## 2017-08-11 DIAGNOSIS — F90.0 ADHD (ATTENTION DEFICIT HYPERACTIVITY DISORDER), INATTENTIVE TYPE: Primary | ICD-10-CM

## 2017-08-11 PROCEDURE — 90792 PSYCH DIAG EVAL W/MED SRVCS: CPT | Performed by: NURSE PRACTITIONER

## 2017-08-11 ASSESSMENT — ANXIETY QUESTIONNAIRES
2. NOT BEING ABLE TO STOP OR CONTROL WORRYING: NEARLY EVERY DAY
5. BEING SO RESTLESS THAT IT IS HARD TO SIT STILL: SEVERAL DAYS
1. FEELING NERVOUS, ANXIOUS, OR ON EDGE: NEARLY EVERY DAY
IF YOU CHECKED OFF ANY PROBLEMS ON THIS QUESTIONNAIRE, HOW DIFFICULT HAVE THESE PROBLEMS MADE IT FOR YOU TO DO YOUR WORK, TAKE CARE OF THINGS AT HOME, OR GET ALONG WITH OTHER PEOPLE: VERY DIFFICULT
7. FEELING AFRAID AS IF SOMETHING AWFUL MIGHT HAPPEN: MORE THAN HALF THE DAYS
3. WORRYING TOO MUCH ABOUT DIFFERENT THINGS: MORE THAN HALF THE DAYS
GAD7 TOTAL SCORE: 15
6. BECOMING EASILY ANNOYED OR IRRITABLE: NEARLY EVERY DAY

## 2017-08-11 ASSESSMENT — PATIENT HEALTH QUESTIONNAIRE - PHQ9
5. POOR APPETITE OR OVEREATING: SEVERAL DAYS
SUM OF ALL RESPONSES TO PHQ QUESTIONS 1-9: 16

## 2017-08-11 NOTE — PROGRESS NOTES
"                                                         Outpatient Psychiatric Evaluation  Adult    Name:  Tessa Barajas  : 1990    Source of Referral:  Primary Care Provider: Hemalatha Nino MD - last visit 9/15/2016  Referred by Tyrone Howard MD last visit 2017  Current Psychotherapist: None -     Identifying Data:  Patient is a 26 year old year old, partnered / significant other  White American female  who presents for initial visit with me.  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 Patient attended the session alone. Consent to communicate signed for Berenice Barajas patient's Mother. Consent for treatment signed and included in electronic medical record. Discussed limits of confidentiality today.    Chief Complaint:  Consultation.  Patient reports: \"concerned about the amount of Effexor (venlafaxine) I am taking and want to discuss other medication options\"  Patient prefers to be called: \"Tessa\"    HPI:  Emergency Department visit for anxiety on 2017.  Patient saw a different provider who suggested another medication, but did not change anything and instead referred to psychiatry.   No history of psychiatry.   Has been on medications for the last few years.   Reports the Effexor (venlafaxine) is helpful for anxiety in public places. She does not like the idea that she has to keep increasing the medication dosing. Has not tried to take the Effexor (venlafaxine) at night.   Is one year from graduating from her program at school.   Adderall (amphetamine salts) was reduced to 20 mg daily last month.   Was diagnosed with Attention Deficit Hyperactivity Disorder (ADHD) in 11th grade from Primary Care Provider and referral for evaluation.   Past diagnoses include: Anxiety, Depression, Attention Deficit Hyperactivity Disorder (ADHD)   Current medications include: Effexor (venlafaxine), Adderall (amphetamine salts)    Medication side effects: Patient " reports she feels tired and fatigued.   Current stressors include: School, Parenting Stress, Phase of Life Difficulties  Coping mechanisms and supports include: Boyfriend, Parents, Friends    Psychiatric Review of Symptoms:  Depression: Interest: Decrease    Depressed Mood    Sleep: No change     Energy: Decrease    Appetite: Increase     Guilt: Increase    Concentration: Decrease    Psychomotor slowing     Suicide: Yes    Irritability: Increase     Ruminations: Increase      PHQ-9 scores:   PHQ-9 SCORE 3/9/2017 6/27/2017 8/11/2017   Total Score 4 7 16     Romy:  Distractibility: Increase    Impulsiveness: Increase    Racing Thoughts: Increase    Pressured Speech: Increase    Irritability   MDQ Score: Borderline Postive Screen    Carries diagnosis of Attention Deficit Hyperactivity Disorder (ADHD)     No history of hypomania or romy  Anxiety: Feeling nervous, anxious, or on edge    Uncontrolled worrying    Worrying too much about different things    Trouble relaxing    Restlessness    Easily annoyed or irritable    Thoughts of impending doom    MARVIN-7 scores:    MARVIN-7 SCORE 9/15/2016 6/27/2017 8/11/2017   Total Score 5 10 15     Panic:  Sweating    Palpitations    Tremors    Tingling    Shortness of Breath    Sense of Impending Doom    Occurs 1-2 times per year needing Emergency Department intervention    Last for about 5-10 minutes per episode  Agoraphobia:  Yes - social anxiety and fear of being in public, worse without Effexor (venlafaxine)   PTSD:  Nightmares   OCD:  No symptoms   Psychosis: Paranoia related to anxiety  ADD / ADHD: Attention Problem(s)    Problems with Listening    Task Completion Difficulties    Poor Organization    Distractible    Forgetful    Stable symptoms    Previous Diagnosis    Previous Treatment: Adderall  Gambling or shoplifting: No   Eating Disorder:  No symptoms  Sleep:   Nightmares/strange dreams     Trouble with staying awake during the day.     Feels rested in the morning.     No  trouble with falling asleep.     Rare daytime naps.    Wakes up sweating.    TMJ and Bruxism and will start wearing a nightguard    Psychiatric History:   Hospitalizations: None, but Emergency Department visits due to panic  History of Commitment? No   Past Treatment: counseling, physician / PCP and medication(s) from physician / PCP  Suicide Attempts: No   Current Suicide Risk:  No  Suicide Assessment Completed Today.  Self-injurious Behavior: Denies currently - History of cutting in high school  Electroconvulsive Therapy (ECT) or Transcranial Magnetic Stimulation (TMS): No   GeneSight Genetic Testing: No     Past medication trials include but are not limited to:   Effexor (venlafaxine)  Adderall (amphetamine salts)  Ativan (lorazepam)  Zoloft (sertraline)  Wellbutrin (buproprion)   Vistaril/Atarax (hydroxyzine)     Substance Use History:  Current use of drugs or alcohol: Alcohol  Socially. Does not crave alcohol daily, but when starts drinking feels she cannot control her intake. Drinks about 4 drinks per episode. No Marijuana.   Patient reports no problems as a result of their drinking / drug use.   Based on the clinical interview, there  are not indications of drug or alcohol abuse. Continue to monitor.  Tobacco use: Yes Cigarettes  3-4 per day the most. Over the last 2 years. Ready to quit?  Yes Nicotine Replacement Therapy tried: None- social smoker   Caffeine:  Yes  1 cups/day of coffee, 1 soda/day  Patient has not received chemical dependency treatment in the past  Recovery Programming Involvement: Not Applicable    Past Medical History:  Past Medical History:   Diagnosis Date     Abnormal Pap smear 12/2011     Anxiety      Headache disorder       Surgery:   Past Surgical History:   Procedure Laterality Date     ABDOMEN SURGERY  1995    had quarter removed     HC TOOTH EXTRACTION W/FORCEP  2011     Allergies:     Allergies   Allergen Reactions     Amoxicillin      Rash as a child     Cefzil [Cefprozil]       "Rash as a child     Primary Care Provider: Hemalatha Nino MD  Seizures or Head Injury: No  Diet: No Restrictions  Food Allergies: No   Exercise: No regular exercise program  Supplements: Reviewed per Electronic Medical Record Today    Current Medications:    Current Outpatient Prescriptions:      venlafaxine (EFFEXOR-XR) 75 MG 24 hr capsule, Take 3 capsules (225 mg) by mouth daily, Disp: 270 capsule, Rfl: 0     amphetamine-dextroamphetamine (ADDERALL XR) 20 MG per 24 hr capsule, Take 1 capsule (20 mg) by mouth daily, Disp: 30 capsule, Rfl: 0     levonorgestrel (MIRENA) 20 MCG/24HR IUD, 1 each by Intrauterine route once., Disp: , Rfl:     The [place] Prescription Monitoring Program has been reviewed and there are no concerns about diversionary activity for controlled substances at this time.  Adderall (amphetamine salts) XR 25 mg 30 tablets filled 5/1, 5/31, 6/29, and 7/27/2017 from Primary Care Provider.     Vital Signs:  Vitals: /80 (BP Location: Right arm, Patient Position: Chair, Cuff Size: Adult Regular)  Pulse 144  Temp 98.5  F (36.9  C) (Oral)  Ht 5' 7\" (1.702 m)  Wt 170 lb (77.1 kg)  SpO2 97%  BMI 26.63 kg/m2    Labs:  Most recent laboratory results reviewed and the pertinent results include:   Admission on 05/04/2017, Discharged on 05/04/2017   Component Date Value Ref Range Status     WBC 05/04/2017 7.5  4.0 - 11.0 10e9/L Final     RBC Count 05/04/2017 4.63  3.8 - 5.2 10e12/L Final     Hemoglobin 05/04/2017 15.1  11.7 - 15.7 g/dL Final     Hematocrit 05/04/2017 42.5  35.0 - 47.0 % Final     MCV 05/04/2017 92  78 - 100 fl Final     MCH 05/04/2017 32.6  26.5 - 33.0 pg Final     MCHC 05/04/2017 35.5  31.5 - 36.5 g/dL Final     RDW 05/04/2017 12.4  10.0 - 15.0 % Final     Platelet Count 05/04/2017 227  150 - 450 10e9/L Final     Diff Method 05/04/2017 Automated Method   Final     % Neutrophils 05/04/2017 72.8  % Final     % Lymphocytes 05/04/2017 16.4  % Final     % Monocytes 05/04/2017 6.6  " % Final     % Eosinophils 05/04/2017 3.5  % Final     % Basophils 05/04/2017 0.4  % Final     % Immature Granulocytes 05/04/2017 0.3  % Final     Nucleated RBCs 05/04/2017 0  0 /100 Final     Absolute Neutrophil 05/04/2017 5.4  1.6 - 8.3 10e9/L Final     Absolute Lymphocytes 05/04/2017 1.2  0.8 - 5.3 10e9/L Final     Absolute Monocytes 05/04/2017 0.5  0.0 - 1.3 10e9/L Final     Absolute Eosinophils 05/04/2017 0.3  0.0 - 0.7 10e9/L Final     Absolute Basophils 05/04/2017 0.0  0.0 - 0.2 10e9/L Final     Abs Immature Granulocytes 05/04/2017 0.0  0 - 0.4 10e9/L Final     Absolute Nucleated RBC 05/04/2017 0.0   Final     D Dimer 05/04/2017 0.4  0.0 - 0.50 ug/ml FEU Final    Comment: This D-dimer assay is intended for use in conjuntion with a clinical pretest   probability assessment model to exclude pulmonary embolism (PE) and as an aid   in the diagnosis of deep venous thrombosis (DVT) in outpatients suspected of   PE   or DVT. The cut-off value is 0.5 g/mL FEU.       Sodium 05/04/2017 138  133 - 144 mmol/L Final     Potassium 05/04/2017 3.7  3.4 - 5.3 mmol/L Final     Chloride 05/04/2017 104  94 - 109 mmol/L Final     Carbon Dioxide 05/04/2017 27  20 - 32 mmol/L Final     Anion Gap 05/04/2017 7  3 - 14 mmol/L Final     Glucose 05/04/2017 94  70 - 99 mg/dL Final     Urea Nitrogen 05/04/2017 11  7 - 30 mg/dL Final     Creatinine 05/04/2017 0.68  0.52 - 1.04 mg/dL Final     GFR Estimate 05/04/2017   >60 mL/min/1.7m2 Final                    Value:>90  Non  GFR Calc       GFR Estimate If Black 05/04/2017   >60 mL/min/1.7m2 Final                    Value:>90   GFR Calc       Calcium 05/04/2017 8.8  8.5 - 10.1 mg/dL Final     Troponin I ES 05/04/2017   0.000 - 0.045 ug/L Final                    Value:<0.015  The 99th percentile for upper reference range is 0.045 ug/L.  Troponin values in   the range of 0.045 - 0.120 ug/L may be associated with risks of adverse   clinical events.       HCG  Qualitative Serum 2017 Negative  NEG Final     Interpretation ECG 2017 Click View Image link to view waveform and result   Final        Most recent EKG from 2017 reviewed. QTc interval 470.    Review of Systems:  10 systems (general, cardiovascular, respiratory, eyes, ENT, endocrine, GI, , M/S, neurological) were reviewed. Most pertinent finding(s) is/are: fatigue. The remaining systems are all unremarkable.    Family History:   Patient reported family history includes:   Family History   Problem Relation Age of Onset     Family History Negative Mother      Family History Negative Father      Mental Illness History: Yes: Mother and Brother, Maternal Cousins, Aunts, Uncles with Depression. No dementia.   Substance Abuse History: Yes: Uncle with alcohol abuse, Cousin with alcohol abuse  Suicide History: Denies, but close friend  from suicide a year ago  Medications: Unknown    Social History:   Birth place: [place], [place]  Childhood: Yes intact home  Siblings:  two Brother(s)- one younger and one older,  zero Sister(s)  Highest education level was some college.   Childhood illnesses: Denies  Current Living situation:  [place], [place] with Daughter, Mother, Father and Brother. Feels safe at home.  Relationship/Marital Status:  from daughter's father after marriage in [phone].   Children: one daughter age 5 years  Firearms/Weapons Access: No: Patient denies   Service: No- Boyfriend served    Legal History:  No: Patient denies any legal history    Significant Losses / Trauma / Abuse / Neglect Issues:  There are indications or report of significant loss, trauma, abuse or neglect issues related to: divorce / relational changes  .   Issues of possible neglect are not present.   A safety and risk management plan has not been developed at this time, however client was given the after-hours number / 911 should there be a change in any of these risk factors..    Mental Status Examination:      Appearance:  awake, alert, adequately groomed, appeared stated age, no apparent distress, normal weight, alone, on time, visible tattoos on chest and wears makeup  Attitude:  cooperative   Eye Contact:  fair  Gait and Station: Normal, No assistive Devices used and No dizziness or falls  Psychomotor Behavior:  no evidence of tardive dyskinesia, dystonia, or tics and fidgeting  Oriented to:  time, person, and place  Attention Span and Concentration:  Normal  Speech:  clear, coherent, regular rate, regular rhythm and fluent  Mood:  anxious and sad   Affect:  mood congruent, tearful at times  Associations:  no loose associations  Thought Process:  logical, linear and goal oriented  Thought Content:  no evidence of suicidal ideation or homicidal ideation, no evidence of psychotic thought and Appropriate to Interview  Recent and Remote Memory:  intact. Not formally assessed. No amnesia.  Fund of Knowledge: appropriate  Insight:  good  Judgment:  intact  Impulse Control:  intact    Suicide Risk Assessment:  Today Tessa Barajas reports no history of suicidal ideation or attempts. In addition, there are notable risk factors for self-harm, including age and anxiety. However, risk is mitigated by commitment to family, absence of past attempts, ability to volunteer a safety plan, history of seeking help when needed, future oriented, no access to firearms or weapons, identifies reasons to live including family, denies suicidal intent or plan, no family history of suicide and denies homicidal ideation, intent, or plan. Therefore, based on all available evidence including the factors cited above, Tessa Barajas does not appear to be at imminent risk for self-harm, does not meet criteria for a 72-hr hold, and therefore remains appropriate for ongoing outpatient level of care.  A thorough assessment of risk factors related to suicide and self-harm have been reviewed and are noted above. The patient convincingly denies suicidality  on several occasions. Local community resources reviewed and printed for patient to use if needed. There was no deceit detected, and the patient presented in a manner that was believable.     DSM5  Diagnosis:  Attention-Deficit/Hyperactivity Disorder  314.00 (F90.0) Predominantly inattentive presentation  296.32 (F33.1) Major Depressive Disorder, Recurrent Episode, Moderate With anxious distress  300.01 (F41.0) Panic Disorder    Medical Comorbidities Include:   Patient Active Problem List    Diagnosis Date Noted     Attention-deficit hyperactivity disorder, other type 12/22/2015     Priority: Medium     Depression with anxiety 06/04/2014     Priority: Medium     Anxiety 07/31/2013     Priority: Medium     Stye 10/01/2012     Priority: Medium     Normal pregnancy, first 07/29/2012     Priority: Medium     Normal labor and delivery 07/29/2012     Priority: Medium     Hyperlipidemia LDL goal <160 04/24/2012     Priority: Medium     Headache disorder      Priority: Medium       Psychosocial & Contextual Factors:  Phase of Life Difficulties and Parenting Stress    Strengths and Opportunities:   Tessa Barajas identified the following strengths or resources that will help she succeed in counseling: commitment to health and well being, friends / good social support, family support, intelligence and positive work environment. Things that may interfere with the patient's success include:  none noted at this time.    A 12-item WHODAS 2.0 assessment was completed by the patient today and recorded in EPIC.    WHODAS 2.0 TOTAL SCORES 8/11/2017   Total Score 37       The Patient Activation Measure (INES) score was completed and recorded in zhouwu. This assesses patient knowledge, skill, and confidence for self-management.   INES Score (Last Two) 8/26/2011   INES Raw Score 47   Activation Score 77.5   INES Level 4       Impression:  Tessa Barajas has history of depression, anxiety, and Attention Deficit Hyperactivity Disorder (ADHD).  Medication side effects and alternatives reviewed. Health promotion activities recommended and reviewed today. Patient reports that she has a gym membership and denies any barriers from going. Collaborative Care Psychiatry Service model reviewed today. Therapy will be starting in a few weeks for additional support. May consider augmenting with Wellbutrin (buproprion) again if needed.     Treatment Plan:    Increase Effexor (venlafaxine) to 300 mg by mouth daily for depression and anxiety. Can try to take at bedtime. Take 75 mg tablet tonight, skip tomorrow AM dose, and take (4) 75 mg tablets tomorrow night.     Continue Adderall (amphetamine salts) XR 20 mg per primary care provider.     Continue all other medications as reviewed per electronic medical record today.     All questions addressed. Education and counseling completed regarding risks and benefits of medications and psychotherapy options.    Safety plan was reviewed. To the Emergency Department as needed or call after hours crisis line at [phone] or [phone].     Continue individual/group therapy as planned with Anh Ferro     Schedule an appointment with me in 6-8 weeks or sooner as needed.  Call [facility] at [phone] to schedule.    Follow up with primary care provider as planned or for acute medical concerns.    Call the psychiatric nurse line with medication questions or concerns at [phone].    My Practice Policy was reviewed and signed: YES     MyChart may be used to communicate with your provider, but this is not intended to be used for emergencies.    Additional Community Resources:    [facility] Crisis Team (24 hour/7days a week): [phone]  Crisis Intervention: 404.990.8952 or 725-494-7726 (TTY: 370.858.1868). Call anytime for help.    [employer] [place] on Mental Illness (www.[place].akash.org): 332.386.6235 or 343-500-2207.   Mental Health Consumer/Survivor Network of [place] (www.mhcsn.net):  415-435-6099 or 995-469-4433    M[employer] [place] (www.mentalhealth.org): 213-063-7148 or 437-905-1525    Administrative Billing:   Time spent with patient was 60 minutes and greater than 50% of time or 40 minutes was spent in counseling and coordination of care.    Patient Status:  Patient will continue to be seen for ongoing consultation and stabilization.    Signed:   Patricia Perez, PhD, APRN, CNP  Psychiatry

## 2017-08-11 NOTE — PATIENT INSTRUCTIONS
Treatment Plan:    Increase Effexor (venlafaxine) to 300 mg by mouth daily for depression and anxiety. Can try to take at bedtime. Take 75 mg tablet tonight, skip tomorrow AM dose, and take 4 75 mg tablets tomorrow night.     Continue Adderall (amphetamine salts) XR 20 mg per primary care provider.     Continue all other medications as reviewed per electronic medical record today.     All questions addressed. Education and counseling completed regarding risks and benefits of medications and psychotherapy options.    Safety plan was reviewed. To the Emergency Department as needed or call after hours crisis line at [phone] or [phone].     Continue individual/group therapy as planned with Anh Ferro     Schedule an appointment with me in 6-8 weeks or sooner as needed.  Call [facility] at [phone] to schedule.    Follow up with primary care provider as planned or for acute medical concerns.    Call the psychiatric nurse line with medication questions or concerns at [phone].    My Practice Policy was reviewed and signed: YES     Amaliahart may be used to communicate with your provider, but this is not intended to be used for emergencies.

## 2017-08-11 NOTE — MR AVS SNAPSHOT
After Visit Summary   8/11/2017    Tessa Barajas    MRN: [id number]           Patient Information     Date Of Birth          1990        Visit Information        Provider Department      8/11/2017 1:15 PM Patricia Perez NP [facility]        Today's Diagnoses     ADHD (attention deficit hyperactivity disorder), inattentive type    -  1      Care Instructions    Treatment Plan:    Increase Effexor (venlafaxine) to 300 mg by mouth daily for depression and anxiety. Can try to take at bedtime. Take 75 mg tablet tonight, skip tomorrow AM dose, and take 4 75 mg tablets tomorrow night.     Continue Adderall (amphetamine salts) XR 20 mg per primary care provider.     Continue all other medications as reviewed per electronic medical record today.     All questions addressed. Education and counseling completed regarding risks and benefits of medications and psychotherapy options.    Safety plan was reviewed. To the Emergency Department as needed or call after hours crisis line at [phone] or [phone].     Continue individual/group therapy as planned with Anh Ferro     Schedule an appointment with me in 6-8 weeks or sooner as needed.  Call [facility] at [phone] to schedule.    Follow up with primary care provider as planned or for acute medical concerns.    Call the psychiatric nurse line with medication questions or concerns at [phone].    My Practice Policy was reviewed and signed: YES     MyChart may be used to communicate with your provider, but this is not intended to be used for emergencies.          Follow-ups after your visit        Follow-up notes from your care team     Return in about 6 weeks (around 9/22/2017).      Your next 10 appointments already scheduled     Aug 29, 2017  9:30 AM CDT   [facility]   [facility] ([facility])    [address] [place] [address]  "[facility] [id number] [phone]              Who to contact     If you have questions or need follow up information about today's clinic visit or your schedule please contact [facility] directly at [phone].  Normal or non-critical lab and imaging results will be communicated to you by MyChart, letter or phone within 4 business days after the clinic has received the results. If you do not hear from us within 7 days, please contact the clinic through MyChart or phone. If you have a critical or abnormal lab result, we will notify you by phone as soon as possible.  Submit refill requests through [employer] or call your pharmacy and they will forward the refill request to us. Please allow 3 business days for your refill to be completed.          Additional Information About Your Visit        [employer] Information     [employer] lets you send messages to your doctor, view your test results, renew your prescriptions, schedule appointments and more. To sign up, go to [email or web address] . Click on \"Log in\" on the left side of the screen, which will take you to the Welcome page. Then click on \"Sign up Now\" on the right side of the page.     You will be asked to enter the access code listed below, as well as some personal information. Please follow the directions to create your username and password.     Your access code is: [email or web address]  Expires: 2017  2:13 PM     Your access code will  in 90 days. If you need help or a new code, please call your [facility] or [phone].        Care EveryWhere ID     This is your Care EveryWhere ID. This could be used by other organizations to access your [place] medical records  [id number]        Your Vitals Were     Pulse Temperature Height Pulse Oximetry BMI (Body Mass Index)       144 98.5  F (36.9  C) (Oral) 5' 7\" (1.702 m) 97% 26.63 kg/m2        Blood Pressure from Last 3 Encounters:   17 120/80   17 122/70   17 " 130/88    Weight from Last 3 Encounters:   08/11/17 170 lb (77.1 kg)   06/27/17 166 lb 4.8 oz (75.4 kg)   05/04/17 160 lb (72.6 kg)              Today, you had the following     No orders found for display       Primary Care Provider Office Phone # Fax #    Hemalatha Nino [phone]       [address] [facility] [address]        Equal Access to Services     [facility]A [facility] : Hadii aad ku hadasho Soomaali, waaxda luqadaha, qaybta kaalmada adeegyada, waxay idiin hayaan adeeg kharash la'aan . So [facility] [phone].    ATENCIÓN: Si habla español, tiene a irizarry disposición servicios gratuitos de asistencia lingüística. [facility] [phone].    We comply with applicable federal civil rights laws and [place] laws. We do not discriminate on the basis of race, color, national origin, age, disability sex, sexual orientation or gender identity.            Thank you!     Thank you for choosing [facility]  for your care. Our goal is always to provide you with excellent care. Hearing back from our patients is one way we can continue to improve our services. Please take a few minutes to complete the written survey that you may receive in the mail after your visit with us. Thank you!             Your Updated Medication List - Protect others around you: Learn how to safely use, store and throw away your medicines at www.disposemymeds.org.          This list is accurate as of: 8/11/17  2:13 PM.  Always use your most recent med list.                   Brand Name Dispense Instructions for use Diagnosis    amphetamine-dextroamphetamine 20 MG per 24 hr capsule    ADDERALL XR    30 capsule    Take 1 capsule (20 mg) by mouth daily    Attention-deficit hyperactivity disorder, other type       MIRENA (52 MG) 20 MCG/24HR IUD   Generic drug:  levonorgestrel      1 each by Intrauterine route once.        venlafaxine 75 MG 24 hr capsule    EFFEXOR-XR    270 capsule    Take 3 capsules (225 mg) by mouth  daily    Depression with anxiety

## 2017-08-11 NOTE — NURSING NOTE
"Chief Complaint   Patient presents with     Consult     referred by Dr Howard- depression/anxiety pt concerned about amount Effexor pt is taking, fatigue, pt wanting to discuss other medication options       Initial /80 (BP Location: Right arm, Patient Position: Chair, Cuff Size: Adult Regular)  Pulse 144  Temp 98.5  F (36.9  C) (Oral)  Ht 5' 7\" (1.702 m)  Wt 170 lb (77.1 kg)  SpO2 97%  BMI 26.63 kg/m2 Estimated body mass index is 26.63 kg/(m^2) as calculated from the following:    Height as of this encounter: 5' 7\" (1.702 m).    Weight as of this encounter: 170 lb (77.1 kg).  Medication Reconciliation: complete    "

## 2017-08-12 ASSESSMENT — ANXIETY QUESTIONNAIRES: GAD7 TOTAL SCORE: 15

## 2017-08-25 DIAGNOSIS — F90.8 ATTENTION-DEFICIT HYPERACTIVITY DISORDER, OTHER TYPE: ICD-10-CM

## 2017-08-25 NOTE — TELEPHONE ENCOUNTER
(Reason for Call:  Medication or medication refill:    Do you use a [facility]?  Name of the phrmacy and phone number for the current request:  [facility]    Name of the medication requested: ADDERALL    Other request: NO    Can we leave a detailed message on this number? YES    Phone number patient can be reached at: Cell number on file:    Telephone Information:   Mobile [phone]       Best Time: ANY    Call taken on 8/25/2017 at 9:01 AM by Orquidea Webb

## 2017-08-28 RX ORDER — DEXTROAMPHETAMINE SACCHARATE, AMPHETAMINE ASPARTATE MONOHYDRATE, DEXTROAMPHETAMINE SULFATE AND AMPHETAMINE SULFATE 5; 5; 5; 5 MG/1; MG/1; MG/1; MG/1
20 CAPSULE, EXTENDED RELEASE ORAL DAILY
Qty: 30 CAPSULE | Refills: 0 | Status: SHIPPED | OUTPATIENT
Start: 2017-08-28 | End: 2017-09-25

## 2017-08-28 NOTE — TELEPHONE ENCOUNTER
Pt calls to check on status of refill. She is out of medication and would like to have this filled at [facility] instead of [facility]. Please notify pt when walked to pharmacy.     Adderall XR 20mg      Last Written Prescription Date:  7/26/17  Last Fill Quantity: 30,   # refills: 0  [facility] Office Visit with [facility] prescribing provider: 6/27/17  Future Office visit:    Next 5 appointments (look out 90 days)     Oct 03, 2017 12:45 PM CDT   Return Visit with Patricia Perez NP   [facility] ([facility]    [address]  [address]  [facility] [address] [phone]                 CSA on file.    Routing refill request to provider for review/approval because:  Drug not on the [facility] refill protocol or controlled substance

## 2017-08-28 NOTE — TELEPHONE ENCOUNTER
Pt informed prescription was approved.   Prescription walked to [facility].

## 2017-08-29 ENCOUNTER — OFFICE VISIT (OUTPATIENT)
Dept: BEHAVIORAL HEALTH | Facility: CLINIC | Age: 27
End: 2017-08-29
Payer: COMMERCIAL

## 2017-08-29 DIAGNOSIS — F33.1 MODERATE EPISODE OF RECURRENT MAJOR DEPRESSIVE DISORDER (H): Primary | ICD-10-CM

## 2017-08-29 DIAGNOSIS — F41.1 GAD (GENERALIZED ANXIETY DISORDER): ICD-10-CM

## 2017-08-29 PROCEDURE — 90791 PSYCH DIAGNOSTIC EVALUATION: CPT | Performed by: SOCIAL WORKER

## 2017-08-29 ASSESSMENT — ANXIETY QUESTIONNAIRES
7. FEELING AFRAID AS IF SOMETHING AWFUL MIGHT HAPPEN: SEVERAL DAYS
6. BECOMING EASILY ANNOYED OR IRRITABLE: NEARLY EVERY DAY
2. NOT BEING ABLE TO STOP OR CONTROL WORRYING: MORE THAN HALF THE DAYS
5. BEING SO RESTLESS THAT IT IS HARD TO SIT STILL: SEVERAL DAYS
GAD7 TOTAL SCORE: 11
1. FEELING NERVOUS, ANXIOUS, OR ON EDGE: MORE THAN HALF THE DAYS
3. WORRYING TOO MUCH ABOUT DIFFERENT THINGS: MORE THAN HALF THE DAYS
IF YOU CHECKED OFF ANY PROBLEMS ON THIS QUESTIONNAIRE, HOW DIFFICULT HAVE THESE PROBLEMS MADE IT FOR YOU TO DO YOUR WORK, TAKE CARE OF THINGS AT HOME, OR GET ALONG WITH OTHER PEOPLE: VERY DIFFICULT

## 2017-08-29 ASSESSMENT — PATIENT HEALTH QUESTIONNAIRE - PHQ9
SUM OF ALL RESPONSES TO PHQ QUESTIONS 1-9: 12
5. POOR APPETITE OR OVEREATING: NOT AT ALL

## 2017-08-29 NOTE — Clinical Note
Please be informed that your patient was seen for a diagnostic evaluation on 8/29/17.  The initial DSM-IV diagnosis are MDD, recurrent, moderate and MARVIN.  A follow-up appt is scheduled for  9/6/17.  The evaluation was completed by SCARLETT Polk, LICSW.

## 2017-08-29 NOTE — MR AVS SNAPSHOT
"                  MRN:[id number]                      After Visit Summary   2017    Tessa Barajas    MRN: [id number]           Visit Information        Provider Department      2017 9:30 AM [facility]      Your next 10 appointments already scheduled     Sep 06, 2017  3:30 PM CDT   Return Visit with Anh Ferro [facility] ([facility])    [address] [address]   [phone]            Sep 13, 2017  3:30 PM CDT   Return Visit with Anh Ferro [facility] ([facility])    [address] [id number] [phone]            Oct 03, 2017 12:45 PM CDT   Return Visit with Patricia Perez NP   [facility] ([facility]) [address]  [address]  [facility] [id number] [phone]              MyChart Information     [employer] lets you send messages to your doctor, view your test results, renew your prescriptions, schedule appointments and more. To sign up, go to [email or web address] . Click on \"Log in\" on the left side of the screen, which will take you to the Welcome page. Then click on \"Sign up Now\" on the right side of the page.     You will be asked to enter the access code listed below, as well as some personal information. Please follow the directions to create your username and password.     Your access code is: [email or web address]  Expires: 2017  2:13 PM     Your access code will  in 90 days. If you need help or a new code, please call your [place] clinic or [phone].        Care EveryWhere ID     This is your Care EveryWhere ID. This could be used by other organizations to access your [place] medical records  [id number]        Equal Access to Services     MARICRUZ RICHARDS AH: " Hadii corey Dewitt, waferminda lulaurynadaha, qahowardta kaalmada horacio, lauren monroy. So [facility] [phone].    ATENCIÓN: Si habla español, tiene a irizarry disposición servicios gratuitos de asistencia lingüística. Llame al [phone].    We comply with applicable federal civil rights laws and [place] laws. We do not discriminate on the basis of race, color, national origin, age, disability sex, sexual orientation or gender identity.
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
## 2017-08-30 ASSESSMENT — ANXIETY QUESTIONNAIRES: GAD7 TOTAL SCORE: 11

## 2017-09-25 ENCOUNTER — TELEPHONE (OUTPATIENT)
Dept: INTERNAL MEDICINE | Facility: CLINIC | Age: 27
End: 2017-09-25

## 2017-09-25 DIAGNOSIS — F90.8 ATTENTION-DEFICIT HYPERACTIVITY DISORDER, OTHER TYPE: ICD-10-CM

## 2017-09-25 RX ORDER — DEXTROAMPHETAMINE SACCHARATE, AMPHETAMINE ASPARTATE MONOHYDRATE, DEXTROAMPHETAMINE SULFATE AND AMPHETAMINE SULFATE 5; 5; 5; 5 MG/1; MG/1; MG/1; MG/1
20 CAPSULE, EXTENDED RELEASE ORAL DAILY
Qty: 30 CAPSULE | Refills: 0 | Status: SHIPPED | OUTPATIENT
Start: 2017-09-25 | End: 2017-10-25

## 2017-09-25 NOTE — TELEPHONE ENCOUNTER
[facility].    Signed CSA form in chart.        Adderall      Last Written Prescription Date:  8/28/17  Last Fill Quantity: 30,   # refills: 0  Last Office Visit with [facility], [facility] or [facility] prescribing provider: 8/29/17 Behavioral health  Future Office visit:    Next 5 appointments (look out 90 days)     Oct 03, 2017 12:45 PM CDT   Return Visit with Patricia Perez NP   [facility] ([facility])    [address]  [address]  [facility] [address] [phone]                   Routing refill request to provider for review/approval because:  Drug not on the [facility], [facility] or [facility] refill protocol or controlled substance.

## 2017-09-25 NOTE — TELEPHONE ENCOUNTER
Reason for Call:  Medication or medication refill:Refill    Do you use a [facility]?  Name of the pharmacy and phone number for the current request:  [facility]     Name of the medication requested: amphetamine-dextroamphetamine (ADDERALL XR) 20 MG per 24 hr capsule    Other request: 2 days left    Can we leave a detailed message on this number? YES    Phone number patient can be reached at: Cell number on file:    Telephone Information:   Mobile [phone]       Best Time: anytime    Call taken on 9/25/2017 at 12:25 PM by DELGADO VOGT

## 2017-09-25 NOTE — TELEPHONE ENCOUNTER
Left voice message for prescription was approved and will be taken to pharmacy in next 30 minutes. Pt advised to call pharmacy before going to  to ensure it will be ready.

## 2017-09-27 ENCOUNTER — FCC EXTENDED DOCUMENTATION (OUTPATIENT)
Dept: BEHAVIORAL HEALTH | Facility: CLINIC | Age: 27
End: 2017-09-27

## 2017-09-27 NOTE — Clinical Note
FYI, Client discharged from services, did not return. SCARLETT Ledezma, [facility] Therapist [facility] [phone]

## 2017-09-27 NOTE — PROGRESS NOTES
Discharge Summary  Single Session    Client Name: Tessa Barajas MRN#: [id number] YOB: 1990      Intake / Discharge Date: 8/29/17      DSM5 Diagnoses: (Sustained by DSM5 Criteria Listed Above)  Diagnoses: 296.32 (F33.1) Major Depressive Disorder, Recurrent Episode, Moderate _  300.02 (F41.1) Generalized Anxiety Disorder  Psychosocial & Contextual Factors: Mild- parenting, deaths, school  WHODAS 2.0 (12 item) Score: 23          Presenting Concern:  Client came in at suggestion of providers to get support around mental health      Reason for Discharge:  Client did not return and Noncompliance- no showed for two appointments      Disposition at Time of Last Encounter:   Comments:   Was seeing psychiatry provider, living with parents     Risk Management:   Client has had a history of self-injurious behavior: over one year ago and denies a history of suicidal ideation, suicide attempts, homicidal ideation, homicidal behavior and and other safety concerns  A safety and risk management plan has not been developed at this time, however client was given the after-hours number / 911 should there be a change in any of these risk factors.      Referred To:  PMD and psychiatry provider        Anh Ferro, [facility]   9/27/2017

## 2017-10-17 DIAGNOSIS — F41.8 DEPRESSION WITH ANXIETY: ICD-10-CM

## 2017-10-17 RX ORDER — VENLAFAXINE HYDROCHLORIDE 75 MG/1
225 CAPSULE, EXTENDED RELEASE ORAL DAILY
Qty: 270 CAPSULE | Refills: 0 | Status: SHIPPED | OUTPATIENT
Start: 2017-10-17 | End: 2017-10-25

## 2017-10-17 NOTE — TELEPHONE ENCOUNTER
Routing refill request to provider for review/approval because:  PHQ-9 >5    Please advise, thanks.

## 2017-10-17 NOTE — TELEPHONE ENCOUNTER
venlafaxine   Last Written Prescription Date: 8/7/17  Last Fill Quantity: 270, # refills: 0  Last Office Visit with [facility], P or [facility] prescribing provider: 8/7/17        BP Readings from Last 3 Encounters:   08/11/17 120/80   06/27/17 122/70   05/04/17 130/88     Pulse: (for Fetzima)  Creatinine   Date Value Ref Range Status   05/04/2017 0.68 0.52 - 1.04 mg/dL Final   ]    Last PHQ-9 score on record=   PHQ-9 SCORE 8/29/2017   Total Score -   Total Score 12         Labs showing if normal/abnormal  Data Unavailable  Lab Results   Component Value Date    AST 19 06/18/2015    ALT 31 06/18/2015      No results found for: CHOL, TRIG, HDL, LDL, VLDL, CHOLHDLRATIO

## 2017-10-25 ENCOUNTER — OFFICE VISIT (OUTPATIENT)
Dept: INTERNAL MEDICINE | Facility: CLINIC | Age: 27
End: 2017-10-25
Payer: COMMERCIAL

## 2017-10-25 VITALS
DIASTOLIC BLOOD PRESSURE: 84 MMHG | TEMPERATURE: 98.5 F | HEIGHT: 67 IN | HEART RATE: 116 BPM | BODY MASS INDEX: 26.84 KG/M2 | SYSTOLIC BLOOD PRESSURE: 114 MMHG | WEIGHT: 171 LBS | OXYGEN SATURATION: 98 %

## 2017-10-25 DIAGNOSIS — F41.8 DEPRESSION WITH ANXIETY: ICD-10-CM

## 2017-10-25 DIAGNOSIS — F90.0 ADHD (ATTENTION DEFICIT HYPERACTIVITY DISORDER), INATTENTIVE TYPE: Primary | ICD-10-CM

## 2017-10-25 PROCEDURE — 99213 OFFICE O/P EST LOW 20 MIN: CPT | Performed by: FAMILY MEDICINE

## 2017-10-25 RX ORDER — VENLAFAXINE HYDROCHLORIDE 150 MG/1
150 CAPSULE, EXTENDED RELEASE ORAL DAILY
Qty: 30 CAPSULE | Refills: 5 | Status: SHIPPED | OUTPATIENT
Start: 2017-10-25 | End: 2018-07-11

## 2017-10-25 RX ORDER — DEXTROAMPHETAMINE SACCHARATE, AMPHETAMINE ASPARTATE MONOHYDRATE, DEXTROAMPHETAMINE SULFATE AND AMPHETAMINE SULFATE 3.75; 3.75; 3.75; 3.75 MG/1; MG/1; MG/1; MG/1
15 CAPSULE, EXTENDED RELEASE ORAL DAILY
Qty: 30 CAPSULE | Refills: 0 | Status: SHIPPED | OUTPATIENT
Start: 2017-10-25 | End: 2017-10-25

## 2017-10-25 RX ORDER — DEXTROAMPHETAMINE SACCHARATE, AMPHETAMINE ASPARTATE MONOHYDRATE, DEXTROAMPHETAMINE SULFATE AND AMPHETAMINE SULFATE 3.75; 3.75; 3.75; 3.75 MG/1; MG/1; MG/1; MG/1
15 CAPSULE, EXTENDED RELEASE ORAL DAILY
Qty: 30 CAPSULE | Refills: 0 | Status: SHIPPED | OUTPATIENT
Start: 2017-10-25 | End: 2017-11-24

## 2017-10-25 NOTE — PROGRESS NOTES
"CHIEF COMPLAINT    F/U anxiety / depression      HISTORY    Consult with Psych reviewed.    Patient has actually decreased her Effexor to 150 mg. Felt kind of out of it on 300 mg.    She also wonders if Adderall is contributing to anxiety.    She is doing well in school. Also managing to care for 4 y/o daughter 50%. Has a boyfriend.    Patient Active Problem List   Diagnosis     Headache disorder     Hyperlipidemia LDL goal <160     Normal pregnancy, first     Normal labor and delivery     Stye     Panic disorder without agoraphobia     Major depressive disorder, recurrent episode, moderate (H)     ADHD (attention deficit hyperactivity disorder), inattentive type       REVIEW OF SYSTEMS    No HA  No palpitation  No syncope  No SI      EXAM  /84 (BP Location: Left arm, Patient Position: Sitting, Cuff Size: Adult Large)  Pulse 116  Temp 98.5  F (36.9  C) (Oral)  Ht 5' 7\" (1.702 m)  Wt 171 lb (77.6 kg)  SpO2 98%  BMI 26.78 kg/m2    Does not appear depressed or significantly anxious.  She is choosing some of her own direction which is encouraging.      (F90.0) ADHD (attention deficit hyperactivity disorder), inattentive type  (primary encounter diagnosis)  Comment: decr dose  Plan: amphetamine-dextroamphetamine (ADDERALL XR) 15         MG per 24 hr capsule, DISCONTINUED:         amphetamine-dextroamphetamine (ADDERALL XR) 15         MG per 24 hr capsule            (F41.8) Depression with anxiety  Comment: continue at 150 mg  Plan: venlafaxine (EFFEXOR-XR) 150 MG 24 hr capsule            Current Outpatient Prescriptions   Medication Sig Dispense Refill     venlafaxine (EFFEXOR-XR) 150 MG 24 hr capsule Take 1 capsule (150 mg) by mouth daily 30 capsule 5     amphetamine-dextroamphetamine (ADDERALL XR) 15 MG per 24 hr capsule Take 1 capsule (15 mg) by mouth daily 30 capsule 0     levonorgestrel (MIRENA) 20 MCG/24HR IUD 1 each by Intrauterine route once.       [DISCONTINUED] venlafaxine (EFFEXOR-XR) 75 MG 24 hr " capsule Take 3 capsules (225 mg) by mouth daily (Patient taking differently: Take 225 mg by mouth daily Taking 2 currently) 270 capsule 0

## 2017-10-25 NOTE — MR AVS SNAPSHOT
"              After Visit Summary   10/25/2017    Tessa Barajas    MRN: [id number]           Patient Information     Date Of Birth          1990        Visit Information        Provider Department      10/25/2017 3:40 PM Tyrone Howard MD [facility]        Today's Diagnoses     ADHD (attention deficit hyperactivity disorder), inattentive type    -  1    Depression with anxiety           Follow-ups after your visit        Who to contact     If you have questions or need follow up information about today's clinic visit or your schedule please contact [facility] directly at [phone].  Normal or non-critical lab and imaging results will be communicated to you by Algal Scientifichart, letter or phone within 4 business days after the clinic has received the results. If you do not hear from us within 7 days, please contact the clinic through Al[employer] or phone. If you have a critical or abnormal lab result, we will notify you by phone as soon as possible.  Submit refill requests through Kihon or call your pharmacy and they will forward the refill request to us. Please allow 3 business days for your refill to be completed.          Additional Information About Your Visit        MyChart Information     Kihon lets you send messages to your doctor, view your test results, renew your prescriptions, schedule appointments and more. To sign up, go to [email or web address] . Click on \"Log in\" on the left side of the screen, which will take you to the Welcome page. Then click on \"Sign up Now\" on the right side of the page.     You will be asked to enter the access code listed below, as well as some personal information. Please follow the directions to create your username and password.     Your access code is: [email or web address]  Expires: 2017  2:13 PM     Your access code will  in 90 days. If you need help or a new code, please call your [facility] or [phone].        Care " "EveryWhere ID     This is your Care EveryWhere ID. This could be used by other organizations to access your [place] medical records  [id number]        Your Vitals Were     Pulse Temperature Height Pulse Oximetry BMI (Body Mass Index)       116 98.5  F (36.9  C) (Oral) 5' 7\" (1.702 m) 98% 26.78 kg/m2        Blood Pressure from Last 3 Encounters:   10/25/17 114/84   08/11/17 120/80   06/27/17 122/70    Weight from Last 3 Encounters:   10/25/17 171 lb (77.6 kg)   08/11/17 170 lb (77.1 kg)   06/27/17 166 lb 4.8 oz (75.4 kg)              Today, you had the following     No orders found for display         Today's Medication Changes          These changes are accurate as of: 10/25/17  3:56 PM.  If you have any questions, ask your nurse or doctor.               Start taking these medicines.        Dose/Directions    amphetamine-dextroamphetamine 15 MG per 24 hr capsule   Commonly known as:  ADDERALL XR   Used for:  ADHD (attention deficit hyperactivity disorder), inattentive type   Started by:  Tyrone Howard MD        Dose:  15 mg   Take 1 capsule (15 mg) by mouth daily   Quantity:  30 capsule   Refills:  0         These medicines have changed or have updated prescriptions.        Dose/Directions    venlafaxine 150 MG 24 hr capsule   Commonly known as:  EFFEXOR-XR   This may have changed:    - medication strength  - how much to take   Used for:  Depression with anxiety   Changed by:  Tyrone Howard MD        Dose:  150 mg   Take 1 capsule (150 mg) by mouth daily   Quantity:  30 capsule   Refills:  5            Where to get your medicines      These medications were sent to [facility] [place], [place] - [address]  [address] [facility] [address]     Phone:  [phone]     venlafaxine 150 MG 24 hr capsule         Some of these will need a paper prescription and others can be bought over the counter.  Ask your nurse if you have questions.     Bring a paper prescription for " each of these medications     amphetamine-dextroamphetamine 15 MG per 24 hr capsule                Primary Care Provider Office Phone # Fax #    Hemalatha Checo Nino [phone]       [address] [facility] [address]        Equal Access to Services     JHONATANDANIELLA SUSIE : Hadii corey ku hadzako Soomaali, waaxda luqadaha, qaybta kaalmada adeegyada, waxnicole idiin haytiman adejuany addisonjenna monroy. So [facility] [phone].    ATENCIÓN: Si habla español, tiene a irizarry disposición servicios gratuitos de asistencia lingüística. Llame al [phone].    We comply with applicable federal civil rights laws and [place] laws. We do not discriminate on the basis of race, color, national origin, age, disability, sex, sexual orientation, or gender identity.            Thank you!     Thank you for choosing [facility]  for your care. Our goal is always to provide you with excellent care. Hearing back from our patients is one way we can continue to improve our services. Please take a few minutes to complete the written survey that you may receive in the mail after your visit with us. Thank you!             Your Updated Medication List - Protect others around you: Learn how to safely use, store and throw away your medicines at www.disposemymeds.org.          This list is accurate as of: 10/25/17  3:56 PM.  Always use your most recent med list.                   Brand Name Dispense Instructions for use Diagnosis    amphetamine-dextroamphetamine 15 MG per 24 hr capsule    ADDERALL XR    30 capsule    Take 1 capsule (15 mg) by mouth daily    ADHD (attention deficit hyperactivity disorder), inattentive type       MIRENA (52 MG) 20 MCG/24HR IUD   Generic drug:  levonorgestrel      1 each by Intrauterine route once.        venlafaxine 150 MG 24 hr capsule    EFFEXOR-XR    30 capsule    Take 1 capsule (150 mg) by mouth daily    Depression with anxiety

## 2017-10-25 NOTE — NURSING NOTE
"Chief Complaint   Patient presents with     Medication Problem     discuss meds        Initial /84 (BP Location: Left arm, Patient Position: Sitting, Cuff Size: Adult Large)  Pulse 116  Temp 98.5  F (36.9  C) (Oral)  Ht 5' 7\" (1.702 m)  Wt 171 lb (77.6 kg)  SpO2 98%  BMI 26.78 kg/m2 Estimated body mass index is 26.78 kg/(m^2) as calculated from the following:    Height as of this encounter: 5' 7\" (1.702 m).    Weight as of this encounter: 171 lb (77.6 kg).  Medication Reconciliation: complete    "

## 2017-10-26 ENCOUNTER — TRANSFERRED RECORDS (OUTPATIENT)
Dept: HEALTH INFORMATION MANAGEMENT | Facility: CLINIC | Age: 27
End: 2017-10-26

## 2017-10-26 LAB
HPV ABSTRACT: ABNORMAL
PAP-ABSTRACT: ABNORMAL

## 2017-11-06 ENCOUNTER — TRANSFERRED RECORDS (OUTPATIENT)
Dept: HEALTH INFORMATION MANAGEMENT | Facility: CLINIC | Age: 27
End: 2017-11-06

## 2017-11-24 DIAGNOSIS — F90.0 ADHD (ATTENTION DEFICIT HYPERACTIVITY DISORDER), INATTENTIVE TYPE: ICD-10-CM

## 2017-11-24 RX ORDER — DEXTROAMPHETAMINE SACCHARATE, AMPHETAMINE ASPARTATE MONOHYDRATE, DEXTROAMPHETAMINE SULFATE AND AMPHETAMINE SULFATE 3.75; 3.75; 3.75; 3.75 MG/1; MG/1; MG/1; MG/1
15 CAPSULE, EXTENDED RELEASE ORAL DAILY
Qty: 30 CAPSULE | Refills: 0 | Status: SHIPPED | OUTPATIENT
Start: 2017-11-24 | End: 2017-12-22

## 2017-11-24 NOTE — TELEPHONE ENCOUNTER
Fill at [facility] - pt needs this filled today.    Adderall XR 15mg      Last Office Visit: 10/25/17  Last Written Prescription Date:  10/25/17  Last Fill Quantity: 30,   # refills: 0  Future Office visit:       Signed CSA on file.      RX [place] ([place]) reviewed:  reviewed- no concerns    [place]:  [email or web address]      Routing refill request to provider for review/approval because:  Drug not on the FMG, UMP or  Health refill protocol or controlled substance

## 2017-11-24 NOTE — TELEPHONE ENCOUNTER
Reason for Call:  Medication or medication refill:Med Refill    Do you use a [facility]?  Name of the pharmacy and phone number for the current request[facility]  [facility] [address] ([place] - [phone]    Name of the medication requested: amphetamine-dextroamphetamine (ADDERALL XR) 15 MG per 24 hr capsule    Other request: Pt needs before weekend please, only 2 pills left    Can we leave a detailed message on this number? YES    Phone number patient can be reached at: Cell number on file:    Telephone Information:   Mobile [phone]       Best Time: anytime    Call taken on 11/24/2017 at 11:58 AM by DELGADO VOGT

## 2017-11-24 NOTE — TELEPHONE ENCOUNTER
was reviewed in initial message.   Left voice message for pt that prescription was approved and will be taken to the pharmacy shortly.

## 2017-12-22 ENCOUNTER — TELEPHONE (OUTPATIENT)
Dept: INTERNAL MEDICINE | Facility: CLINIC | Age: 27
End: 2017-12-22

## 2017-12-22 DIAGNOSIS — Z79.899 CONTROLLED SUBSTANCE AGREEMENT SIGNED: ICD-10-CM

## 2017-12-22 DIAGNOSIS — F90.0 ADHD (ATTENTION DEFICIT HYPERACTIVITY DISORDER), INATTENTIVE TYPE: ICD-10-CM

## 2017-12-22 RX ORDER — DEXTROAMPHETAMINE SACCHARATE, AMPHETAMINE ASPARTATE MONOHYDRATE, DEXTROAMPHETAMINE SULFATE AND AMPHETAMINE SULFATE 3.75; 3.75; 3.75; 3.75 MG/1; MG/1; MG/1; MG/1
15 CAPSULE, EXTENDED RELEASE ORAL DAILY
Qty: 30 CAPSULE | Refills: 0 | Status: SHIPPED | OUTPATIENT
Start: 2017-12-22 | End: 2018-01-22

## 2017-12-22 NOTE — TELEPHONE ENCOUNTER
Fill at [facility].     Adderall XR 15mg      Last Written Prescription Date:  11/24/17  Last Fill Quantity: 30,   # refills: 0  Last Office Visit: 10/25/17  Future Office visit:       Signed CSA on file.     RX monitoring program ([place]) reviewed:  reviewed- no concerns    [place] profile:  [email or web address]     Routing refill request to provider for review/approval because:  Drug not on the FMG, UMP or  Health refill protocol or controlled substance

## 2017-12-22 NOTE — TELEPHONE ENCOUNTER
Reason for Call:  Medication or medication refill:    Do you use a [facility]?  Name of the pharmacy and phone number for the current request[facility] [address] ([place]) - [phone]    Name of the medication requested: adderall    Other request: none    Can we leave a detailed message on this number? YES    Phone number patient can be reached at: Home number on file [phone] (home)    Best Time: any    Call taken on 12/22/2017 at 8:31 AM by Sydnie Casiano

## 2017-12-27 ENCOUNTER — TRANSFERRED RECORDS (OUTPATIENT)
Dept: HEALTH INFORMATION MANAGEMENT | Facility: CLINIC | Age: 27
End: 2017-12-27

## 2018-01-22 DIAGNOSIS — Z79.899 CONTROLLED SUBSTANCE AGREEMENT SIGNED: ICD-10-CM

## 2018-01-22 DIAGNOSIS — F90.0 ADHD (ATTENTION DEFICIT HYPERACTIVITY DISORDER), INATTENTIVE TYPE: ICD-10-CM

## 2018-01-22 NOTE — TELEPHONE ENCOUNTER
Adderall      Last Written Prescription Date:  12/22/17  Last Fill Quantity: 30,   # refills: 0  Last Office Visit: 10/25/17 Dr. Howard  Future Office visit:       Routing refill request to provider for review/approval because:  Drug not on the FMG, UMP or  Health refill protocol or controlled substance  RX monitoring program ([place]) reviewed:  not reviewed/not due - last done on 12/22    [place]MP profile:  [email or web address]

## 2018-01-23 RX ORDER — DEXTROAMPHETAMINE SACCHARATE, AMPHETAMINE ASPARTATE MONOHYDRATE, DEXTROAMPHETAMINE SULFATE AND AMPHETAMINE SULFATE 3.75; 3.75; 3.75; 3.75 MG/1; MG/1; MG/1; MG/1
15 CAPSULE, EXTENDED RELEASE ORAL DAILY
Qty: 30 CAPSULE | Refills: 0 | Status: SHIPPED | OUTPATIENT
Start: 2018-01-23 | End: 2018-02-19

## 2018-02-12 ENCOUNTER — OFFICE VISIT (OUTPATIENT)
Dept: URGENT CARE | Facility: URGENT CARE | Age: 28
End: 2018-02-12
Payer: COMMERCIAL

## 2018-02-12 VITALS
TEMPERATURE: 100.5 F | HEART RATE: 114 BPM | WEIGHT: 174.4 LBS | OXYGEN SATURATION: 100 % | RESPIRATION RATE: 28 BRPM | SYSTOLIC BLOOD PRESSURE: 118 MMHG | BODY MASS INDEX: 27.31 KG/M2 | DIASTOLIC BLOOD PRESSURE: 70 MMHG

## 2018-02-12 DIAGNOSIS — J03.90 TONSILLITIS: ICD-10-CM

## 2018-02-12 DIAGNOSIS — R11.0 NAUSEA: ICD-10-CM

## 2018-02-12 DIAGNOSIS — R07.0 THROAT PAIN: ICD-10-CM

## 2018-02-12 DIAGNOSIS — R50.9 FEVER AND CHILLS: Primary | ICD-10-CM

## 2018-02-12 LAB
DEPRECATED S PYO AG THROAT QL EIA: NORMAL
SPECIMEN SOURCE: NORMAL

## 2018-02-12 PROCEDURE — 99213 OFFICE O/P EST LOW 20 MIN: CPT | Performed by: FAMILY MEDICINE

## 2018-02-12 PROCEDURE — 87081 CULTURE SCREEN ONLY: CPT | Performed by: FAMILY MEDICINE

## 2018-02-12 PROCEDURE — 87880 STREP A ASSAY W/OPTIC: CPT | Performed by: FAMILY MEDICINE

## 2018-02-12 RX ORDER — AZITHROMYCIN 250 MG/1
TABLET, FILM COATED ORAL
Qty: 6 TABLET | Refills: 0 | Status: SHIPPED | OUTPATIENT
Start: 2018-02-12 | End: 2018-02-17

## 2018-02-12 NOTE — MR AVS SNAPSHOT
"              After Visit Summary   2018    Tessa Barajas    MRN: [id number]           Patient Information     Date Of Birth          1990        Visit Information        Provider Department      2018 4:25 PM Royer Webb,  [facility]        Today's Diagnoses     Fever and chills    -  1    Tonsillitis        Throat pain        Nausea           Follow-ups after your visit        Who to contact     If you have questions or need follow up information about today's clinic visit or your schedule please contact [facility] directly at [phone].  Normal or non-critical lab and imaging results will be communicated to you by MyChart, letter or phone within 4 business days after the clinic has received the results. If you do not hear from us within 7 days, please contact the clinic through AutoNavihart or phone. If you have a critical or abnormal lab result, we will notify you by phone as soon as possible.  Submit refill requests through N-Trig or call your pharmacy and they will forward the refill request to us. Please allow 3 business days for your refill to be completed.          Additional Information About Your Visit        MyChart Information     N-Trig lets you send messages to your doctor, view your test results, renew your prescriptions, schedule appointments and more. To sign up, go to [email or web address] . Click on \"Log in\" on the left side of the screen, which will take you to the Welcome page. Then click on \"Sign up Now\" on the right side of the page.     You will be asked to enter the access code listed below, as well as some personal information. Please follow the directions to create your username and password.     Your access code is: [email or web address]  Expires: 2018  5:44 PM     Your access code will  in 90 days. If you need help or a new code, please call your [place] clinic or [phone].        Care EveryWhere ID "     This is your Care EveryWhere ID. This could be used by other organizations to access your [place] medical records  [id number]        Your Vitals Were     Pulse Temperature Respirations Pulse Oximetry BMI (Body Mass Index)       114 100.5  F (38.1  C) (Oral) 28 100% 27.31 kg/m2        Blood Pressure from Last 3 Encounters:   02/12/18 118/70   10/25/17 114/84   08/11/17 120/80    Weight from Last 3 Encounters:   02/12/18 174 lb 6.4 oz (79.1 kg)   10/25/17 171 lb (77.6 kg)   08/11/17 170 lb (77.1 kg)              We Performed the Following     Beta strep group A culture     Strep, Rapid Screen          Today's Medication Changes          These changes are accurate as of 2/12/18  5:44 PM.  If you have any questions, ask your nurse or doctor.               Start taking these medicines.        Dose/Directions    azithromycin 250 MG tablet   Commonly known as:  ZITHROMAX   Used for:  Tonsillitis   Started by:  Royer Webb, DO        Two tablets first day, then one tablet daily for four days.   Quantity:  6 tablet   Refills:  0            Where to get your medicines      These medications were sent to [facility] [address] [address]     Phone:  [phone]     azithromycin 250 MG tablet                Primary Care Provider Office Phone # Fax #    Hemalatha Checo Nino [phone]       [address] [facility] [address]        Equal Access to Services     MARICRUZ RICHARDS AH: Hadii corey ku nuhao Sofarhad, waaxda luqadaha, qaybta kaalmada kavehyariaz, waxnicole mekhi monroy. So [facility] [phone].    ATENCIÓN: Si habla español, tiene a irizarry disposición servicios gratuitos de asistencia lingüística. Llame al [phone].    We comply with applicable federal civil rights laws and [place] laws. We do not discriminate on the basis of race, color, national origin, age, disability, sex, sexual orientation, or  gender identity.            Thank you!     Thank you for choosing [facility]  for your care. Our goal is always to provide you with excellent care. Hearing back from our patients is one way we can continue to improve our services. Please take a few minutes to complete the written survey that you may receive in the mail after your visit with us. Thank you!             Your Updated Medication List - Protect others around you: Learn how to safely use, store and throw away your medicines at www.disposemymeds.org.          This list is accurate as of 2/12/18  5:44 PM.  Always use your most recent med list.                   Brand Name Dispense Instructions for use Diagnosis    amphetamine-dextroamphetamine 15 MG per 24 hr capsule    ADDERALL XR    30 capsule    Take 1 capsule (15 mg) by mouth daily    ADHD (attention deficit hyperactivity disorder), inattentive type, Controlled substance agreement signed       azithromycin 250 MG tablet    ZITHROMAX    6 tablet    Two tablets first day, then one tablet daily for four days.    Tonsillitis       MIRENA (52 MG) 20 MCG/24HR IUD   Generic drug:  levonorgestrel      1 each by Intrauterine route once.        venlafaxine 150 MG 24 hr capsule    EFFEXOR-XR    30 capsule    Take 1 capsule (150 mg) by mouth daily    Depression with anxiety

## 2018-02-12 NOTE — PROGRESS NOTES
SUBJECTIVE:Tessa Barajas is a 27 year old female with a chief complaint of sore throat.    Onset of symptoms was 2 day(s) ago.    Course of illness: still present.    Severity moderate  Current and Associated symptoms: nausea and fever  Treatment measures tried include Tylenol/Ibuprofen.  Predisposing factors include None.    Past Medical History:   Diagnosis Date     Abnormal Pap smear 12/2011     Anxiety      Headache disorder      Allergies   Allergen Reactions     Amoxicillin      Rash as a child     Cefzil [Cefprozil]      Rash as a child     Social History   Substance Use Topics     Smoking status: Former Smoker     Smokeless tobacco: Never Used      Comment: 2-3 cigs per day     Alcohol use Yes      Comment: occasionally-less once per week       ROS:  SKIN: no rash  GI: no vomiting    OBJECTIVE:   /70  Pulse 114  Temp 100.5  F (38.1  C) (Oral)  Resp 28  Wt 174 lb 6.4 oz (79.1 kg)  SpO2 100%  BMI 27.31 kg/m2   GENERAL APPEARANCE: healthy, alert and no distress  EYES: EOMI,  PERRL, conjunctiva clear  HENT: ear canals and TM's normal.  Nose normal.  Pharynx erythematous with some exudate noted.  NECK: supple, non-tender to palpation, no adenopathy noted  RESP: lungs clear to auscultation - no rales, rhonchi or wheezes  SKIN: no suspicious lesions or rashes      ICD-10-CM    1. Fever and chills R50.9    2. Tonsillitis J03.90 azithromycin (ZITHROMAX) 250 MG tablet   3. Throat pain R07.0 Strep, Rapid Screen     Beta strep group A culture   4. Nausea R11.0 Strep, Rapid Screen       Symptomatic treat with gargles, lozenges, and OTC analgesic as needed.  Follow-up with primary clinic if not improving.

## 2018-02-13 LAB
BACTERIA SPEC CULT: NORMAL
SPECIMEN SOURCE: NORMAL

## 2018-02-19 DIAGNOSIS — F90.0 ADHD (ATTENTION DEFICIT HYPERACTIVITY DISORDER), INATTENTIVE TYPE: ICD-10-CM

## 2018-02-19 DIAGNOSIS — Z79.899 CONTROLLED SUBSTANCE AGREEMENT SIGNED: Primary | ICD-10-CM

## 2018-02-19 RX ORDER — DEXTROAMPHETAMINE SACCHARATE, AMPHETAMINE ASPARTATE MONOHYDRATE, DEXTROAMPHETAMINE SULFATE AND AMPHETAMINE SULFATE 3.75; 3.75; 3.75; 3.75 MG/1; MG/1; MG/1; MG/1
15 CAPSULE, EXTENDED RELEASE ORAL DAILY
Qty: 30 CAPSULE | Refills: 0 | Status: SHIPPED | OUTPATIENT
Start: 2018-02-19 | End: 2018-03-20

## 2018-02-19 NOTE — TELEPHONE ENCOUNTER
Reason for Call:  Medication or medication refill:    Do you use a [facility]?  Name of the pharmacy and phone number for the current request[facility]  [facility] [address] ([place]) - [phone]    Name of the medication requested: adderall    Other request: none    Can we leave a detailed message on this number? YES    Phone number patient can be reached at: Home number on file [phone] (home)    Best Time: any    Call taken on 2/19/2018 at 10:18 AM by Sydnie Casiano

## 2018-02-19 NOTE — TELEPHONE ENCOUNTER
Brought adderall xr rx down to [facility] [place] and left v/m for pt as well letting her know and to make an appt with PCP within 1 month

## 2018-02-23 ENCOUNTER — TELEPHONE (OUTPATIENT)
Dept: INTERNAL MEDICINE | Facility: CLINIC | Age: 28
End: 2018-02-23

## 2018-02-23 NOTE — TELEPHONE ENCOUNTER
Left voice message for pt to calls back with update if she had any improvement in symptoms after taking first Z-yuly.

## 2018-02-23 NOTE — TELEPHONE ENCOUNTER
Reason for Call:  Other prescription    Detailed comments: Pt went to  last week and got a zpak.  Pt is still having the same sx's-swollen tonsils, ear aches.  Can she get another zpak? [facility] in [place]    Phone Number Patient can be reached at: Home number on file [phone] (home    Best Time: any    Can we leave a detailed message on this number? YES    Call taken on 2/23/2018 at 9:19 AM by Sydnie Casiano

## 2018-02-23 NOTE — TELEPHONE ENCOUNTER
Pt returning call.  States symptoms went away when she was on Zpack.  Since this am, c/o tonsils aching and both ears are hurting.  Advil helps temporarily.    Asking if she needs another round of Zpack?    Next step?    Please advise, thanks.

## 2018-03-05 ENCOUNTER — TELEPHONE (OUTPATIENT)
Dept: INTERNAL MEDICINE | Facility: CLINIC | Age: 28
End: 2018-03-05

## 2018-03-05 NOTE — TELEPHONE ENCOUNTER
Reason for Call:  Other Shot record    Detailed comments: Pt needs her TB to be negative but pt is not sure if she's had that tested before?  If so pt needs documentation showing the negative test.    Phone Number Patient can be reached at: Home number on file [phone] (home)    Best Time: any    Can we leave a detailed message on this number? YES    Call taken on 3/5/2018 at 12:03 PM by Sydnie Casiano

## 2018-03-05 NOTE — TELEPHONE ENCOUNTER
Contacted pt, informed her we have not done TB testing through our office. She needs this for a job shadow experience at [employer]. Informed pt we can do [place] testing as nurse only appt , she would need to come back in 48-72 hours to have this read and we can provide documentation of negative reading if test is negative. Pt agrees with plan. Nurse only appt scheduled for tomorrow.

## 2018-03-20 ENCOUNTER — OFFICE VISIT (OUTPATIENT)
Dept: INTERNAL MEDICINE | Facility: CLINIC | Age: 28
End: 2018-03-20
Payer: COMMERCIAL

## 2018-03-20 VITALS
HEIGHT: 67 IN | HEART RATE: 104 BPM | WEIGHT: 169.6 LBS | TEMPERATURE: 99 F | SYSTOLIC BLOOD PRESSURE: 112 MMHG | OXYGEN SATURATION: 99 % | BODY MASS INDEX: 26.62 KG/M2 | DIASTOLIC BLOOD PRESSURE: 70 MMHG

## 2018-03-20 DIAGNOSIS — F90.0 ADHD (ATTENTION DEFICIT HYPERACTIVITY DISORDER), INATTENTIVE TYPE: Primary | ICD-10-CM

## 2018-03-20 DIAGNOSIS — Z79.899 CONTROLLED SUBSTANCE AGREEMENT SIGNED: ICD-10-CM

## 2018-03-20 DIAGNOSIS — F33.1 MAJOR DEPRESSIVE DISORDER, RECURRENT EPISODE, MODERATE (H): ICD-10-CM

## 2018-03-20 PROCEDURE — 99214 OFFICE O/P EST MOD 30 MIN: CPT | Performed by: INTERNAL MEDICINE

## 2018-03-20 RX ORDER — DEXTROAMPHETAMINE SACCHARATE, AMPHETAMINE ASPARTATE MONOHYDRATE, DEXTROAMPHETAMINE SULFATE AND AMPHETAMINE SULFATE 3.75; 3.75; 3.75; 3.75 MG/1; MG/1; MG/1; MG/1
15 CAPSULE, EXTENDED RELEASE ORAL DAILY
Qty: 30 CAPSULE | Refills: 0 | Status: SHIPPED | OUTPATIENT
Start: 2018-03-20 | End: 2018-04-23

## 2018-03-20 NOTE — PROGRESS NOTES
"  SUBJECTIVE:   Tessa Barajas is a 27 year old female who presents to clinic today for the following health issues:      Depression Followup & adderall med check    Status since last visit: Improved a little bit.    See PHQ-9 for current symptoms.  Other associated symptoms: None    Complicating factors:   Significant life event:  No   Current substance abuse:  None  Anxiety or Panic symptoms:  No    PHQ-9 6/27/2017 8/11/2017 8/29/2017   Total Score 7 16 12   Q9: Suicide Ideation Not at all More than half the days Not at all     Sleeps 9 hours per night.    Patient reports thyroid test was normal a couple of months ago at gynecology office.     PHQ-9 SCORE 8/11/2017 8/29/2017 3/20/2018   Total Score - - -   Total Score 16 12 3       PHQ-9  English  PHQ-9   Any Language  Suicide Assessment Five-step Evaluation and Treatment (SAFE-T)    Amount of exercise or physical activity: none    Problems taking medications regularly: No    Medication side effects: none    Diet: regular (no restrictions)        PROBLEMS TO ADD ON...    Problem list and histories reviewed & adjusted, as indicated.  Additional history: as documented    Labs reviewed in EPIC    Reviewed and updated as needed this visit by clinical staff       Reviewed and updated as needed this visit by Provider         ROS:  CONSTITUTIONAL: NEGATIVE for fever, chills, change in weight  ENT/MOUTH: NEGATIVE for ear, mouth and throat problems  RESP: NEGATIVE for significant cough or SOB  CV: NEGATIVE for chest pain, palpitations or peripheral edema    OBJECTIVE:     /70 (BP Location: Right arm, Patient Position: Sitting, Cuff Size: Adult Regular)  Pulse 104  Temp 99  F (37.2  C) (Oral)  Ht 5' 7\" (1.702 m)  Wt 169 lb 9.6 oz (76.9 kg)  SpO2 99%  BMI 26.56 kg/m2  Body mass index is 26.56 kg/(m^2).  GENERAL: healthy, alert and no distress  RESP: lungs clear to auscultation - no rales, rhonchi or wheezes  CV: regular rate and rhythm, normal S1 S2, no S3 or S4, " no murmur, click or rub  Psych: normal affect    ASSESSMENT/PLAN:       (F90.0) ADHD (attention deficit hyperactivity disorder), inattentive type  Comment:  Plan: amphetamine-dextroamphetamine (ADDERALL XR) 15         MG per 24 hr capsule        -considering adding to     (Z79.899) Controlled substance agreement signed- okay 12/22/17  Comment:   Plan: amphetamine-dextroamphetamine (ADDERALL XR) 15         MG per 24 hr capsule         (F33.1) Major depressive disorder, recurrent episode, moderate (H)  Comment: stable  Plan: effexor  -Recommend exercising       Hemalatha Nino MD  [facility]

## 2018-03-20 NOTE — NURSING NOTE
"Chief Complaint   Patient presents with     Recheck Medication     Follow up on Adderall and Effexor.       Initial /70 (BP Location: Right arm, Patient Position: Sitting, Cuff Size: Adult Regular)  Pulse 133  Temp 99  F (37.2  C) (Oral)  Ht 5' 7\" (1.702 m)  Wt 169 lb 9.6 oz (76.9 kg)  SpO2 99%  BMI 26.56 kg/m2 Estimated body mass index is 26.56 kg/(m^2) as calculated from the following:    Height as of this encounter: 5' 7\" (1.702 m).    Weight as of this encounter: 169 lb 9.6 oz (76.9 kg).  Medication Reconciliation: complete   Sharon Eisenberg MA      "

## 2018-03-20 NOTE — MR AVS SNAPSHOT
"              After Visit Summary   3/20/2018    Tessa Barajas    MRN: [id number]           Patient Information     Date Of Birth          1990        Visit Information        Provider Department      3/20/2018 10:20 AM Hemalatha Nino MD [facility]        Care Instructions    150 minutes per week of exercise          Follow-ups after your visit        Who to contact     If you have questions or need follow up information about today's clinic visit or your schedule please contact [facility] directly at [phone].  Normal or non-critical lab and imaging results will be communicated to you by MyChart, letter or phone within 4 business days after the clinic has received the results. If you do not hear from us within 7 days, please contact the clinic through [employer] or phone. If you have a critical or abnormal lab result, we will notify you by phone as soon as possible.  Submit refill requests through [employer] or call your pharmacy and they will forward the refill request to us. Please allow 3 business days for your refill to be completed.          Additional Information About Your Visit        MyChart Information     [employer] lets you send messages to your doctor, view your test results, renew your prescriptions, schedule appointments and more. To sign up, go to [email or web address] . Click on \"Log in\" on the left side of the screen, which will take you to the Welcome page. Then click on \"Sign up Now\" on the right side of the page.     You will be asked to enter the access code listed below, as well as some personal information. Please follow the directions to create your username and password.     Your access code is: [email or web address]  Expires: 2018  6:44 PM     Your access code will  in 90 days. If you need help or a new code, please call your [facility] or [phone].        Care EveryWhere ID     This is your Care EveryWhere ID. This could be used by " "other organizations to access your [place] medical records  [id number]        Your Vitals Were     Pulse Temperature Height Pulse Oximetry BMI (Body Mass Index)       104 99  F (37.2  C) (Oral) 5' 7\" (1.702 m) 99% 26.56 kg/m2        Blood Pressure from Last 3 Encounters:   03/20/18 112/70   02/12/18 118/70   10/25/17 114/84    Weight from Last 3 Encounters:   03/20/18 169 lb 9.6 oz (76.9 kg)   02/12/18 174 lb 6.4 oz (79.1 kg)   10/25/17 171 lb (77.6 kg)              Today, you had the following     No orders found for display       Primary Care Provider Office Phone # Fax #    Hemalatha Nino [phone]       [address] [facility] [address]        Equal Access to Services     [facility]: Hadii aad ku hadasho Soomaali, waaxda luqadaha, qaybta kaalmada adeegyada, waxay soloin hayaan kaveh carter . So [facility] [phone].    ATENCIÓN: Si habla español, tiene a irizarry disposición servicios gratuitos de asistencia lingüística. Llame al [phone].    We comply with applicable federal civil rights laws and [place] laws. We do not discriminate on the basis of race, color, national origin, age, disability, sex, sexual orientation, or gender identity.            Thank you!     Thank you for choosing [facility]  for your care. Our goal is always to provide you with excellent care. Hearing back from our patients is one way we can continue to improve our services. Please take a few minutes to complete the written survey that you may receive in the mail after your visit with us. Thank you!             Your Updated Medication List - Protect others around you: Learn how to safely use, store and throw away your medicines at www.disposemymeds.org.          This list is accurate as of 3/20/18 11:01 AM.  Always use your most recent med list.                   Brand Name Dispense Instructions for use Diagnosis    amphetamine-dextroamphetamine 15 MG per 24 hr capsule    " ADDERALL XR    30 capsule    Take 1 capsule (15 mg) by mouth daily    ADHD (attention deficit hyperactivity disorder), inattentive type, Controlled substance agreement signed       MIRENA (52 MG) 20 MCG/24HR IUD   Generic drug:  levonorgestrel      1 each by Intrauterine route once.        venlafaxine 150 MG 24 hr capsule    EFFEXOR-XR    30 capsule    Take 1 capsule (150 mg) by mouth daily    Depression with anxiety

## 2018-03-21 ASSESSMENT — PATIENT HEALTH QUESTIONNAIRE - PHQ9: SUM OF ALL RESPONSES TO PHQ QUESTIONS 1-9: 3

## 2018-04-23 ENCOUNTER — TELEPHONE (OUTPATIENT)
Dept: INTERNAL MEDICINE | Facility: CLINIC | Age: 28
End: 2018-04-23

## 2018-04-23 DIAGNOSIS — Z79.899 CONTROLLED SUBSTANCE AGREEMENT SIGNED: ICD-10-CM

## 2018-04-23 DIAGNOSIS — F90.0 ADHD (ATTENTION DEFICIT HYPERACTIVITY DISORDER), INATTENTIVE TYPE: ICD-10-CM

## 2018-04-23 RX ORDER — DEXTROAMPHETAMINE SACCHARATE, AMPHETAMINE ASPARTATE MONOHYDRATE, DEXTROAMPHETAMINE SULFATE AND AMPHETAMINE SULFATE 3.75; 3.75; 3.75; 3.75 MG/1; MG/1; MG/1; MG/1
15 CAPSULE, EXTENDED RELEASE ORAL DAILY
Qty: 30 CAPSULE | Refills: 0 | Status: SHIPPED | OUTPATIENT
Start: 2018-04-23 | End: 2018-05-31

## 2018-04-23 NOTE — TELEPHONE ENCOUNTER
Mail to [facility] in [place] on [address]  Signed CSA form in chart.      Adderall      Last Written Prescription Date:  3/20/18  Last Fill Quantity: 30,   # refills: 0  Last Office Visit: 3/20/18  Future Office visit:       Routing refill request to provider for review/approval because:  Drug not on the [facility], P or [facility] refill protocol or controlled substance

## 2018-04-23 NOTE — TELEPHONE ENCOUNTER
Reason for Call:  Medication or medication refill:    Do you use a [facility]?  Name of the pharmacy and phone number for the current request:  [facility] [address] [facility] - [phone]    Name of the medication requested: adderall    Other request: none    Can we leave a detailed message on this number? YES    Phone number patient can be reached at: Home number on file [phone] (home)    Best Time: any    Call taken on 4/23/2018 at 11:45 AM by Sydnie Casiano

## 2018-05-23 ENCOUNTER — TRANSFERRED RECORDS (OUTPATIENT)
Dept: HEALTH INFORMATION MANAGEMENT | Facility: CLINIC | Age: 28
End: 2018-05-23

## 2018-05-23 LAB — PAP-ABSTRACT: NORMAL

## 2018-05-31 DIAGNOSIS — F90.0 ADHD (ATTENTION DEFICIT HYPERACTIVITY DISORDER), INATTENTIVE TYPE: ICD-10-CM

## 2018-05-31 DIAGNOSIS — Z79.899 CONTROLLED SUBSTANCE AGREEMENT SIGNED: ICD-10-CM

## 2018-05-31 NOTE — TELEPHONE ENCOUNTER
Walk to pharmacy when done.     Adderall       Last Written Prescription Date:  4/23/18  Last Fill Quantity: 30,   # refills: 0  Last Office Visit: 3/20/18  Future Office visit:       Routing refill request to provider for review/approval because:  Drug not on the [facility], P or [facility] refill protocol or controlled substance    PH: [phone] Ok to leave detailed phone message.

## 2018-06-01 RX ORDER — DEXTROAMPHETAMINE SACCHARATE, AMPHETAMINE ASPARTATE MONOHYDRATE, DEXTROAMPHETAMINE SULFATE AND AMPHETAMINE SULFATE 3.75; 3.75; 3.75; 3.75 MG/1; MG/1; MG/1; MG/1
15 CAPSULE, EXTENDED RELEASE ORAL DAILY
Qty: 30 CAPSULE | Refills: 0 | Status: SHIPPED | OUTPATIENT
Start: 2018-06-01 | End: 2018-06-27

## 2018-06-27 ENCOUNTER — TELEPHONE (OUTPATIENT)
Dept: INTERNAL MEDICINE | Facility: CLINIC | Age: 28
End: 2018-06-27

## 2018-06-27 DIAGNOSIS — Z79.899 CONTROLLED SUBSTANCE AGREEMENT SIGNED: ICD-10-CM

## 2018-06-27 DIAGNOSIS — F90.0 ADHD (ATTENTION DEFICIT HYPERACTIVITY DISORDER), INATTENTIVE TYPE: ICD-10-CM

## 2018-06-27 RX ORDER — DEXTROAMPHETAMINE SACCHARATE, AMPHETAMINE ASPARTATE MONOHYDRATE, DEXTROAMPHETAMINE SULFATE AND AMPHETAMINE SULFATE 3.75; 3.75; 3.75; 3.75 MG/1; MG/1; MG/1; MG/1
15 CAPSULE, EXTENDED RELEASE ORAL DAILY
Qty: 30 CAPSULE | Refills: 0 | Status: SHIPPED | OUTPATIENT
Start: 2018-06-27 | End: 2018-07-30

## 2018-06-27 NOTE — TELEPHONE ENCOUNTER
Pt wants rx brought down to [facility]      Last Written Prescription Date:  6/1/18  Last Fill Quantity: 30,  # refills: 0   Last office visit: 3/20/2018 with prescribing provider:     FOSTER on file

## 2018-06-27 NOTE — TELEPHONE ENCOUNTER
Reason for Call:  Medication or medication refill:    Do you use a [facility]?  Name [facility] and phone [facility] for the current request[facility] [address] ([place]) - [phone]    Name of the medication requested: adderall    Other request: none    Can we leave a detailed message on this number? YES    Phone number patient can be reached at: Home number on file [phone] (home)    Best Time: any    Call taken on 6/27/2018 at 8:52 AM by Sydnie Casiano

## 2018-07-11 ENCOUNTER — OFFICE VISIT (OUTPATIENT)
Dept: INTERNAL MEDICINE | Facility: CLINIC | Age: 28
End: 2018-07-11
Payer: COMMERCIAL

## 2018-07-11 VITALS
HEART RATE: 90 BPM | WEIGHT: 181.6 LBS | SYSTOLIC BLOOD PRESSURE: 110 MMHG | RESPIRATION RATE: 18 BRPM | HEIGHT: 67 IN | DIASTOLIC BLOOD PRESSURE: 80 MMHG | OXYGEN SATURATION: 99 % | TEMPERATURE: 98.6 F | BODY MASS INDEX: 28.5 KG/M2

## 2018-07-11 DIAGNOSIS — F33.1 MAJOR DEPRESSIVE DISORDER, RECURRENT EPISODE, MODERATE (H): ICD-10-CM

## 2018-07-11 DIAGNOSIS — R53.83 OTHER FATIGUE: Primary | ICD-10-CM

## 2018-07-11 DIAGNOSIS — Z23 NEED FOR TUBERCULOSIS VACCINATION: ICD-10-CM

## 2018-07-11 DIAGNOSIS — F90.0 ADHD (ATTENTION DEFICIT HYPERACTIVITY DISORDER), INATTENTIVE TYPE: ICD-10-CM

## 2018-07-11 DIAGNOSIS — F41.8 DEPRESSION WITH ANXIETY: ICD-10-CM

## 2018-07-11 PROCEDURE — 99214 OFFICE O/P EST MOD 30 MIN: CPT | Performed by: INTERNAL MEDICINE

## 2018-07-11 RX ORDER — VENLAFAXINE HYDROCHLORIDE 150 MG/1
150 CAPSULE, EXTENDED RELEASE ORAL DAILY
Qty: 90 CAPSULE | Refills: 1 | Status: SHIPPED | OUTPATIENT
Start: 2018-07-11 | End: 2019-03-06

## 2018-07-11 ASSESSMENT — ANXIETY QUESTIONNAIRES
IF YOU CHECKED OFF ANY PROBLEMS ON THIS QUESTIONNAIRE, HOW DIFFICULT HAVE THESE PROBLEMS MADE IT FOR YOU TO DO YOUR WORK, TAKE CARE OF THINGS AT HOME, OR GET ALONG WITH OTHER PEOPLE: SOMEWHAT DIFFICULT
6. BECOMING EASILY ANNOYED OR IRRITABLE: SEVERAL DAYS
2. NOT BEING ABLE TO STOP OR CONTROL WORRYING: NOT AT ALL
1. FEELING NERVOUS, ANXIOUS, OR ON EDGE: SEVERAL DAYS
7. FEELING AFRAID AS IF SOMETHING AWFUL MIGHT HAPPEN: NOT AT ALL
5. BEING SO RESTLESS THAT IT IS HARD TO SIT STILL: NOT AT ALL
GAD7 TOTAL SCORE: 2
3. WORRYING TOO MUCH ABOUT DIFFERENT THINGS: NOT AT ALL

## 2018-07-11 ASSESSMENT — PATIENT HEALTH QUESTIONNAIRE - PHQ9: 5. POOR APPETITE OR OVEREATING: NOT AT ALL

## 2018-07-11 NOTE — PATIENT INSTRUCTIONS
10 lbs weight loss  400 cecille less per day- 1 to 2 lbs per week    Exercise 150 minutes per week

## 2018-07-11 NOTE — NURSING NOTE
"Vital signs:  Temp: 98.6  F (37  C) Temp src: Oral BP: 110/80 Pulse: 117   Resp: 18 SpO2: 99 %     Height: 5' 7\" (170.2 cm) Weight: 181 lb 9.6 oz (82.4 kg)  Estimated body mass index is 28.44 kg/(m^2) as calculated from the following:    Height as of this encounter: 5' 7\" (1.702 m).    Weight as of this encounter: 181 lb 9.6 oz (82.4 kg).      Med check.  "

## 2018-07-11 NOTE — PROGRESS NOTES
"  SUBJECTIVE:   Tessa Barajas is a 27 year old female who presents to clinic today for the following health issues:    She needs a mantoux test.    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   No cough, night sweats, fever or chills. No weight loss.     ADHD.  No complaints about work and/or school.    Depression Followup    Status since last visit: Stable     See PHQ-9 for current symptoms.  Other associated symptoms: None    Complicating factors:   Significant life event:  No   Current substance abuse:  None  Anxiety or Panic symptoms:  Yes-  occasional    PHQ-9 8/11/2017 8/29/2017 3/20/2018   Total Score 16 12 3   Q9: Suicide Ideation More than half the days Not at all Not at all       Fatigue. She noticed over the past 6 months. She is sleeping 9 hours per sleep.  She snores once in awhile.   Did not change diet or exercise  Had a normal thyroid at gynecology appointment, per patient.     PHQ-9  English  PHQ-9   Any Language  Suicide Assessment Five-step Evaluation and Treatment (SAFE-T)    Amount of exercise or physical activity: no    Problems taking medications regularly: No    Medication side effects: none    Diet: regular (no restrictions)        PROBLEMS TO ADD ON...    Problem list and histories reviewed & adjusted, as indicated.  Additional history: as documented    Labs reviewed in EPIC    Reviewed and updated as needed this visit by clinical staff  Tobacco  Allergies  Meds  Med Hx  Surg Hx  Fam Hx  Soc Hx      Reviewed and updated as needed this visit by Provider         ROS:  CONSTITUTIONAL: NEGATIVE for fever, chills; POS weight gain; POS fatigue  RESP: NEGATIVE for significant cough or SOB  CV: NEGATIVE for chest pain, palpitations or peripheral edema  Psych: normal affect    OBJECTIVE:     /80 (BP Location: Left arm, Patient Position: Sitting, Cuff Size: Adult Regular)  Pulse 117  Temp 98.6  F (37  C) (Oral)  Resp 18  Ht 5' 7\" (1.702 m)  Wt 181 " lb 9.6 oz (82.4 kg)  SpO2 99%  BMI 28.44 kg/m2  Body mass index is 28.44 kg/(m^2).  GENERAL: healthy, alert and no distress  NECK: no adenopathy, no asymmetry, masses, or scars and thyroid normal to palpation  RESP: lungs clear to auscultation - no rales, rhonchi or wheezes  CV: regular rate and rhythm, normal S1 S2, no S3 or S4, no murmur  Psych: normal affect    ASSESSMENT/PLAN:       (R53.83) Other fatigue  (primary encounter diagnosis)  Comment: assess for metabolic etiology  Plan: TSH with free T4 reflex, CBC with platelets         differential, Comprehensive metabolic panel        -exercise and weight loss  -consider sleep apnea test, pending improvement with symptoms with exercise and weight loss    (F33.1) Major depressive disorder, recurrent episode, moderate (H)  Comment: stable  Plan: effexor    (F90.0) ADHD (attention deficit hyperactivity disorder), inattentive type  Comment: stable  Plan: adderrall    (F41.8) Depression with anxiety  Comment:stable  Plan: venlafaxine (EFFEXOR-XR) 150 MG 24 hr capsule                Hemalatha Nino MD  [facility]

## 2018-07-12 ASSESSMENT — ANXIETY QUESTIONNAIRES: GAD7 TOTAL SCORE: 2

## 2018-07-12 ASSESSMENT — PATIENT HEALTH QUESTIONNAIRE - PHQ9: SUM OF ALL RESPONSES TO PHQ QUESTIONS 1-9: 5

## 2018-07-13 ENCOUNTER — ALLIED HEALTH/NURSE VISIT (OUTPATIENT)
Dept: NURSING | Facility: CLINIC | Age: 28
End: 2018-07-13
Payer: COMMERCIAL

## 2018-07-13 DIAGNOSIS — Z11.1 SCREENING EXAMINATION FOR PULMONARY TUBERCULOSIS: Primary | ICD-10-CM

## 2018-07-13 LAB
PPDINDURATION: 0 MM (ref 0–5)
PPDREDNESS: 0 MM

## 2018-07-13 PROCEDURE — 99207 ZZC NO CHARGE NURSE ONLY: CPT

## 2018-07-13 PROCEDURE — 86580 TB INTRADERMAL TEST: CPT | Performed by: INTERNAL MEDICINE

## 2018-07-13 NOTE — MR AVS SNAPSHOT
After Visit Summary   7/13/2018    Tessa Barajas    MRN: [id number]           Patient Information     Date Of Birth          1990        Visit Information        Provider Department      7/13/2018 1:15 PM Rn, [facility]        Today's Diagnoses     Screening examination for pulmonary tuberculosis    -  1       Follow-ups after your visit        Who to contact     If you have questions or need follow up information about today's clinic visit or your schedule please contact [facility] directly at [phone].  Normal or non-critical lab and imaging results will be communicated to you by MyChart, letter or phone within 4 business days after the clinic has received the results. If you do not hear from us within 7 days, please contact the clinic through [employer] or phone. If you have a critical or abnormal lab result, we will notify you by phone as soon as possible.  Submit refill requests through [employer] or call your pharmacy and they will forward the refill request to us. Please allow 3 business days for your refill to be completed.          Additional Information About Your Visit        MyChart Information     [employer] gives you secure access to your electronic health record. If you see a primary care provider, you can also send messages to your care team and make appointments. If you have questions, please call your primary care clinic.  If you do not have a primary care provider, please call [phone] and they will assist you.        Care EveryWhere ID     This is your Care EveryWhere ID. This could be used by other organizations to access your [place] medical records  [id number]         Blood Pressure from Last 3 Encounters:   07/11/18 110/80   03/20/18 112/70   02/12/18 118/70    Weight from Last 3 Encounters:   07/11/18 181 lb 9.6 oz (82.4 kg)   03/20/18 169 lb 9.6 oz (76.9 kg)   02/12/18 174 lb 6.4 oz (79.1 kg)              Today, you had the  following     No orders found for display       Primary Care Provider Office Phone # Fax #    Hemalatha Nino [phone]       [address] [facility] [address]        Equal Access to Services     [facility] : Hadshahrzad corey viera nuhao Soshonali, waaxda luqadaha, qaybta kaalmada adeegyada, lauren tenan kaveh gregory raul monroy. So [facility] [phone].    ATENCIÓN: Si habla español, tiene a irizarry disposición servicios gratuitos de asistencia lingüística. Llame al [phone].    We comply with applicable federal civil rights laws and [place] laws. We do not discriminate on the basis of race, color, national origin, age, disability, sex, sexual orientation, or gender identity.            Thank you!     Thank you for choosing [facility]  for your care. Our goal is always to provide you with excellent care. Hearing back from our patients is one way we can continue to improve our services. Please take a few minutes to complete the written survey that you may receive in the mail after your visit with us. Thank you!             Your Updated Medication List - Protect others around you: Learn how to safely use, store and throw away your medicines at www.disposemymeds.org.          This list is accurate as of 7/13/18  1:39 PM.  Always use your most recent med list.                   Brand Name Dispense Instructions for use Diagnosis    amphetamine-dextroamphetamine 15 MG per 24 hr capsule    ADDERALL XR    30 capsule    Take 1 capsule (15 mg) by mouth daily    ADHD (attention deficit hyperactivity disorder), inattentive type, Controlled substance agreement signed       MIRENA (52 MG) 20 MCG/24HR IUD   Generic drug:  levonorgestrel      1 each by Intrauterine route once.        venlafaxine 150 MG 24 hr capsule    EFFEXOR-XR    90 capsule    Take 1 capsule (150 mg) by mouth daily    Depression with anxiety

## 2018-07-30 DIAGNOSIS — F90.0 ADHD (ATTENTION DEFICIT HYPERACTIVITY DISORDER), INATTENTIVE TYPE: ICD-10-CM

## 2018-07-30 DIAGNOSIS — Z79.899 CONTROLLED SUBSTANCE AGREEMENT SIGNED: ICD-10-CM

## 2018-07-30 RX ORDER — DEXTROAMPHETAMINE SACCHARATE, AMPHETAMINE ASPARTATE MONOHYDRATE, DEXTROAMPHETAMINE SULFATE AND AMPHETAMINE SULFATE 3.75; 3.75; 3.75; 3.75 MG/1; MG/1; MG/1; MG/1
15 CAPSULE, EXTENDED RELEASE ORAL DAILY
Qty: 30 CAPSULE | Refills: 0 | Status: SHIPPED | OUTPATIENT
Start: 2018-07-30 | End: 2018-08-29

## 2018-07-30 NOTE — TELEPHONE ENCOUNTER
Reason for Call:  Medication or medication refill:    Do you use a [facility]?  Name of the pharmacy and phone number for the current request:  [facility] [address] ([place]) - [phone]    Name of the medication requested: Adderall 15mg    Other request: Please bring down to pharmacy once complete    Can we leave a detailed message on this number? YES    Phone number patient can be reached at: Cell number on file:    Telephone Information:   Mobile [phone]       Best Time: ASAP    Call taken on 7/30/2018 at 2:59 PM by Shrutih Nguyen      Adderall 15mg      Last Written Prescription Date:  6/27/18  Last Fill Quantity: 30,   # refills: 0  Last Office Visit: 7/11/18  Future Office visit: none      Routing refill request to provider for review/approval because:  Drug not on the [facility], [facility] or  Health refill protocol or controlled substance

## 2018-08-28 DIAGNOSIS — F90.0 ADHD (ATTENTION DEFICIT HYPERACTIVITY DISORDER), INATTENTIVE TYPE: ICD-10-CM

## 2018-08-28 DIAGNOSIS — Z79.899 CONTROLLED SUBSTANCE AGREEMENT SIGNED: ICD-10-CM

## 2018-08-28 NOTE — TELEPHONE ENCOUNTER
Reason for Call:  Medication or medication refill:    Do you use a [facility]?  Name of the [facility] and phone number for the current request[facility] - [phone]    Name of the medication requested: adderall    Other request: none    Can we leave a detailed message on this number? YES    Phone number patient can be reached at: Cell number on file:    Telephone Information:   Mobile [phone]       Best Time: any    Call taken on 8/28/2018 at 12:00 PM by Nano Esparza

## 2018-08-29 RX ORDER — DEXTROAMPHETAMINE SACCHARATE, AMPHETAMINE ASPARTATE MONOHYDRATE, DEXTROAMPHETAMINE SULFATE AND AMPHETAMINE SULFATE 3.75; 3.75; 3.75; 3.75 MG/1; MG/1; MG/1; MG/1
15 CAPSULE, EXTENDED RELEASE ORAL DAILY
Qty: 30 CAPSULE | Refills: 0 | Status: SHIPPED | OUTPATIENT
Start: 2018-08-29 | End: 2018-10-11

## 2018-08-29 NOTE — TELEPHONE ENCOUNTER
Fill at [facility].     Requested Prescriptions   Pending Prescriptions Disp Refills     amphetamine-dextroamphetamine (ADDERALL XR) 15 MG per 24 hr capsule  Last Written Prescription Date:  7/30/18  Last Fill Quantity: 30,  # refills: 0   Last office visit: 7/11/2018 with prescribing provider:  Trung   Future Office Visit:     30 capsule 0     Sig: Take 1 capsule (15 mg) by mouth daily    There is no refill protocol information for this order      RX [place] reviewed:  reviewed- no concerns    [place]:  [email or web address]    Routing refill request to provider for review/approval because:  Drug not on the [facility] refill protocol

## 2018-09-26 ENCOUNTER — OFFICE VISIT (OUTPATIENT)
Dept: INTERNAL MEDICINE | Facility: CLINIC | Age: 28
End: 2018-09-26
Payer: COMMERCIAL

## 2018-09-26 VITALS
BODY MASS INDEX: 29.55 KG/M2 | HEART RATE: 121 BPM | WEIGHT: 188.3 LBS | HEIGHT: 67 IN | SYSTOLIC BLOOD PRESSURE: 116 MMHG | OXYGEN SATURATION: 100 % | RESPIRATION RATE: 16 BRPM | DIASTOLIC BLOOD PRESSURE: 84 MMHG | TEMPERATURE: 98.3 F

## 2018-09-26 DIAGNOSIS — D23.4 CYST, DERMOID, SCALP AND NECK: Primary | ICD-10-CM

## 2018-09-26 PROCEDURE — 99213 OFFICE O/P EST LOW 20 MIN: CPT | Performed by: NURSE PRACTITIONER

## 2018-09-26 NOTE — MR AVS SNAPSHOT
After Visit Summary   9/26/2018    Tessa Barajas    MRN: [id number]           Patient Information     Date Of Birth          1990        Visit Information        Provider Department      9/26/2018 2:20 PM Briana Santiago NP [facility]        Today's Diagnoses     Cyst, dermoid, scalp and neck    -  1       Follow-ups after your visit        Additional Services     DERMATOLOGY REFERRAL       Your provider has referred you to: [facility]: [facility] - [place] [phone]    Please be aware that coverage of these services is subject to the terms and limitations of your health insurance plan.  Call member services at your health plan with any benefit or coverage questions.      Please bring the following with you to your appointment:    (1) Any X-Rays, CTs or MRIs which have been performed.  Contact the facility where they were done to arrange for  prior to your scheduled appointment.    (2) List of current medications  (3) This referral request   (4) Any documents/labs given to you for this referral                  Follow-up notes from your care team     Return if symptoms worsen or fail to improve.      Who to contact     If you have questions or need follow up information about today's clinic visit or your schedule please contact [facility] directly at [phone].  Normal or non-critical lab and imaging results will be communicated to you by MyChart, letter or phone within 4 business days after the clinic has received the results. If you do not hear from us within 7 days, please contact the clinic through MyChart or phone. If you have a critical or abnormal lab result, we will notify you by phone as soon as possible.  Submit refill requests through [employer] or call your pharmacy and they will forward the refill request to us. Please allow 3 business days for your refill to be completed.          Additional Information About Your Visit    "     [employer] Information     [employer] gives you secure access to your electronic health record. If you see a primary care provider, you can also send messages to your care team and make appointments. If you have questions, please call your primary care clinic.  If you do not have a primary care provider, please call [phone] and they will assist you.        Care EveryWhere ID     This is your Care EveryWhere ID. This could be used by other organizations to access your [place] medical records  [id number]        Your Vitals Were     Pulse Temperature Respirations Height Pulse Oximetry BMI (Body Mass Index)    121 98.3  F (36.8  C) (Oral) 16 5' 7\" (1.702 m) 100% 29.49 kg/m2       Blood Pressure from Last 3 Encounters:   09/26/18 116/84   07/11/18 110/80   03/20/18 112/70    Weight from Last 3 Encounters:   09/26/18 188 lb 4.8 oz (85.4 kg)   07/11/18 181 lb 9.6 oz (82.4 kg)   03/20/18 169 lb 9.6 oz (76.9 kg)              We Performed the Following     DERMATOLOGY REFERRAL        Primary Care Provider Office Phone # Fax #    Hemalatha Nino [phone]       [address] [facility] [address]        Equal Access to Services     [facility]: Hadii aad ku hadasho Soomaali, waaxda luqadaha, qaybta kaalmada adeegyada, waxay idiin hayaan kaveh khrudy lacamron . So [facility] [phone].    ATENCIÓN: Si habla español, tiene a irizarry disposición servicios gratuitos de asistencia lingüística. Llame al [phone].    We comply with applicable federal civil rights laws and [place] laws. We do not discriminate on the basis of race, color, national origin, age, disability, sex, sexual orientation, or gender identity.            Thank you!     Thank you for choosing [facility]  for your care. Our goal is always to provide you with excellent care. Hearing back from our patients is one way we can continue to improve our services. Please take a few minutes to complete the written survey " that you may receive in the mail after your visit with us. Thank you!             Your Updated Medication List - Protect others around you: Learn how to safely use, store and throw away your medicines at www.disposemymeds.org.          This list is accurate as of 9/26/18  2:35 PM.  Always use your most recent med list.                   Brand Name Dispense Instructions for use Diagnosis    amphetamine-dextroamphetamine 15 MG per 24 hr capsule    ADDERALL XR    30 capsule    Take 1 capsule (15 mg) by mouth daily    ADHD (attention deficit hyperactivity disorder), inattentive type, Controlled substance agreement signed       MIRENA (52 MG) 20 MCG/24HR IUD   Generic drug:  levonorgestrel      1 each by Intrauterine route once.        venlafaxine 150 MG 24 hr capsule    EFFEXOR-XR    90 capsule    Take 1 capsule (150 mg) by mouth daily    Depression with anxiety

## 2018-09-26 NOTE — PROGRESS NOTES
SUBJECTIVE:   Tessa Barajas is a 27 year old female who presents to clinic today for the following health issues:      Cyst on scalp      Duration: years    Description (location/character/radiation): cyst on L posterior scalp, getting bigger and tender.    Intensity:  mild    Accompanying signs and symptoms: none    History (similar episodes/previous evaluation): None    Precipitating or alleviating factors: None    Therapies tried and outcome: None           Problem list and histories reviewed & adjusted, as indicated.  Additional history: as documented    Patient Active Problem List   Diagnosis     Headache disorder     Hyperlipidemia LDL goal <160     Normal pregnancy, first     Normal labor and delivery     Stye     Panic disorder without agoraphobia     Major depressive disorder, recurrent episode, moderate (H)     ADHD (attention deficit hyperactivity disorder), inattentive type     Controlled substance agreement signed- okay 12/22/17     Past Surgical History:   Procedure Laterality Date     ABDOMEN SURGERY  1995    had quarter removed     HC TOOTH EXTRACTION W/FORCEP  2011       Social History   Substance Use Topics     Smoking status: Former Smoker     Smokeless tobacco: Never Used      Comment: 2-3 cigs per day     Alcohol use Yes      Comment: occasionally-less once per week     Family History   Problem Relation Age of Onset     Family History Negative Mother      Family History Negative Father          Current Outpatient Prescriptions   Medication Sig Dispense Refill     amphetamine-dextroamphetamine (ADDERALL XR) 15 MG per 24 hr capsule Take 1 capsule (15 mg) by mouth daily 30 capsule 0     levonorgestrel (MIRENA) 20 MCG/24HR IUD 1 each by Intrauterine route once.       venlafaxine (EFFEXOR-XR) 150 MG 24 hr capsule Take 1 capsule (150 mg) by mouth daily 90 capsule 1     BP Readings from Last 3 Encounters:   09/26/18 116/84   07/11/18 110/80   03/20/18 112/70    Wt Readings from Last 3 Encounters:  "  09/26/18 188 lb 4.8 oz (85.4 kg)   07/11/18 181 lb 9.6 oz (82.4 kg)   03/20/18 169 lb 9.6 oz (76.9 kg)                    Reviewed and updated as needed this visit by clinical staff  Tobacco  Allergies  Meds  Med Hx  Surg Hx  Fam Hx  Soc Hx      Reviewed and updated as needed this visit by Provider         ROS:  Constitutional, HEENT, cardiovascular, pulmonary, gi and gu systems are negative, except as otherwise noted.    OBJECTIVE:     /84 (BP Location: Right arm, Patient Position: Sitting, Cuff Size: Adult Large)  Pulse 121  Temp 98.3  F (36.8  C) (Oral)  Resp 16  Ht 5' 7\" (1.702 m)  Wt 188 lb 4.8 oz (85.4 kg)  SpO2 100%  BMI 29.49 kg/m2  Body mass index is 29.49 kg/(m^2).  GENERAL: healthy, alert and no distress  SKIN: L posterior scalp tender, mobile cystic lesion 1 cm round        ASSESSMENT/PLAN:               ICD-10-CM    1. Cyst, dermoid, scalp and neck D23.4 DERMATOLOGY REFERRAL       Excision if bothersome  F/u prn    Briana Santiago NP  [facility]    "

## 2018-10-11 DIAGNOSIS — Z79.899 CONTROLLED SUBSTANCE AGREEMENT SIGNED: ICD-10-CM

## 2018-10-11 DIAGNOSIS — F90.0 ADHD (ATTENTION DEFICIT HYPERACTIVITY DISORDER), INATTENTIVE TYPE: ICD-10-CM

## 2018-10-11 NOTE — TELEPHONE ENCOUNTER
Reason for Call:  Medication or medication refill:    Do you use a [facility]?  Name of the pharmacy and phone number for the current request:  [facility] [address] [place] - [phone]    Name of the medication requested: adderall    Other request: none    Can we leave a detailed message on this number? YES    Phone number patient can be reached at: Work number on file:  There is no work phone number on file.    Best Time: anytime    Call taken on 10/11/2018 at 9:12 AM by Catie Joyce

## 2018-10-11 NOTE — TELEPHONE ENCOUNTER
Fill at [facility].     Requested Prescriptions   Pending Prescriptions Disp Refills     amphetamine-dextroamphetamine (ADDERALL XR) 15 MG per 24 hr capsule  Last Written Prescription Date:  8/29/18  Last Fill Quantity: 30,  # refills: 0   Last office visit: 9/26/2018 with prescribing provider:  Briana; 7/11/18 Dr. Nino   Future Office Visit:    30 capsule 0     Sig: Take 1 capsule (15 mg) by mouth daily    There is no refill protocol information for this order      Signed CSA on file.     RX [place] ([place] reviewed:  not reviewed/not due - last done on 8/29/18    [place]:  [email or web address]     Routing refill request to provider for review/approval because:  Drug not on the [facility] refill protocol

## 2018-10-12 RX ORDER — DEXTROAMPHETAMINE SACCHARATE, AMPHETAMINE ASPARTATE MONOHYDRATE, DEXTROAMPHETAMINE SULFATE AND AMPHETAMINE SULFATE 3.75; 3.75; 3.75; 3.75 MG/1; MG/1; MG/1; MG/1
15 CAPSULE, EXTENDED RELEASE ORAL DAILY
Qty: 30 CAPSULE | Refills: 0 | Status: SHIPPED | OUTPATIENT
Start: 2018-10-12 | End: 2018-11-07

## 2018-11-07 ENCOUNTER — TELEPHONE (OUTPATIENT)
Dept: INTERNAL MEDICINE | Facility: CLINIC | Age: 28
End: 2018-11-07

## 2018-11-07 DIAGNOSIS — F90.0 ADHD (ATTENTION DEFICIT HYPERACTIVITY DISORDER), INATTENTIVE TYPE: ICD-10-CM

## 2018-11-07 DIAGNOSIS — Z79.899 CONTROLLED SUBSTANCE AGREEMENT SIGNED: ICD-10-CM

## 2018-11-07 NOTE — TELEPHONE ENCOUNTER
Reason for Call:  Medication or medication refill:    Do you use a [facility]?  Name of the pharmacy and phone number for the current request:  [facility] [address] [facility] - [phone]    Name of the medication requested: adderall    Other request: none    Can we leave a detailed message on this number? YES    Phone number patient can be reached at: Home number on file [phone] (home)    Best Time: any    Call taken on 11/7/2018 at 1:24 PM by Sydnie Casiano

## 2018-11-07 NOTE — TELEPHONE ENCOUNTER
Requested Prescriptions   Pending Prescriptions Disp Refills     amphetamine-dextroamphetamine (ADDERALL XR) 15 MG per 24 hr capsule  Last Written Prescription Date:  10/12/18  Last Fill Quantity: 30,  # refills: 0   Last office visit: 9/26/2018 with prescribing provider:  No   Future Office Visit:     30 capsule 0     Sig: Take 1 capsule (15 mg) by mouth daily    There is no refill protocol information for this order        Routing refill request to provider for review/approval because:  Drug not on the [facility] refill protocol

## 2018-11-09 RX ORDER — DEXTROAMPHETAMINE SACCHARATE, AMPHETAMINE ASPARTATE MONOHYDRATE, DEXTROAMPHETAMINE SULFATE AND AMPHETAMINE SULFATE 3.75; 3.75; 3.75; 3.75 MG/1; MG/1; MG/1; MG/1
15 CAPSULE, EXTENDED RELEASE ORAL DAILY
Qty: 30 CAPSULE | Refills: 0 | Status: SHIPPED | OUTPATIENT
Start: 2019-01-04 | End: 2019-03-27

## 2018-11-09 RX ORDER — DEXTROAMPHETAMINE SACCHARATE, AMPHETAMINE ASPARTATE MONOHYDRATE, DEXTROAMPHETAMINE SULFATE AND AMPHETAMINE SULFATE 3.75; 3.75; 3.75; 3.75 MG/1; MG/1; MG/1; MG/1
15 CAPSULE, EXTENDED RELEASE ORAL DAILY
Qty: 30 CAPSULE | Refills: 0 | Status: SHIPPED | OUTPATIENT
Start: 2018-11-09 | End: 2018-11-09

## 2018-11-09 RX ORDER — DEXTROAMPHETAMINE SACCHARATE, AMPHETAMINE ASPARTATE MONOHYDRATE, DEXTROAMPHETAMINE SULFATE AND AMPHETAMINE SULFATE 3.75; 3.75; 3.75; 3.75 MG/1; MG/1; MG/1; MG/1
15 CAPSULE, EXTENDED RELEASE ORAL DAILY
Qty: 30 CAPSULE | Refills: 0 | Status: SHIPPED | OUTPATIENT
Start: 2018-12-07 | End: 2018-11-09

## 2018-11-19 DIAGNOSIS — F41.8 DEPRESSION WITH ANXIETY: ICD-10-CM

## 2018-11-20 NOTE — TELEPHONE ENCOUNTER
"Requested Prescriptions   Pending Prescriptions Disp Refills     venlafaxine (EFFEXOR-XR) 75 MG 24 hr capsule [Pharmacy Med Name: VENLAFAXINE ER 75MG CAPSULES]  Last Written Prescription Date:  7/11/2018  Last Fill Quantity: 90,  # refills: 1   Last office visit: 9/26/2018 with prescribing provider:     Future Office Visit:   270 capsule 0     Sig: TAKE 3 CAPSULE BY MOUTH DAILY    Serotonin-Norepinephrine Reuptake Inhibitors  Failed    11/19/2018  1:46 PM       Failed - Normal serum creatinine on file in past 12 months    Recent Labs   Lab Test  05/04/17   0955   CR  0.68            Passed - Blood pressure under 140/90 in past 12 months    BP Readings from Last 3 Encounters:   09/26/18 116/84   07/11/18 110/80   03/20/18 112/70                Passed - Recent (12 mo) or future (30 days) visit within the authorizing provider's specialty    Patient had office visit in the last 12 months or has a visit in the next 30 days with authorizing provider or within the authorizing provider's specialty.  See \"Patient Info\" tab in inbasket, or \"Choose Columns\" in Meds & Orders section of the refill encounter.             Passed - Patient is age 18 or older       Passed - No active pregnancy on record       Passed - No positive pregnancy test in past 12 months        "

## 2018-11-23 RX ORDER — VENLAFAXINE HYDROCHLORIDE 75 MG/1
CAPSULE, EXTENDED RELEASE ORAL
Qty: 270 CAPSULE | Refills: 0 | OUTPATIENT
Start: 2018-11-23

## 2018-11-23 NOTE — TELEPHONE ENCOUNTER
Last refill 7/11/18 for 6 months. Due for 6 month follow up in January. Medication should last until then. Refill denied.

## 2018-12-18 ENCOUNTER — TELEPHONE (OUTPATIENT)
Dept: INTERNAL MEDICINE | Facility: CLINIC | Age: 28
End: 2018-12-18

## 2018-12-18 DIAGNOSIS — Z79.899 CONTROLLED SUBSTANCE AGREEMENT SIGNED: ICD-10-CM

## 2018-12-18 DIAGNOSIS — F90.0 ADHD (ATTENTION DEFICIT HYPERACTIVITY DISORDER), INATTENTIVE TYPE: ICD-10-CM

## 2018-12-18 NOTE — TELEPHONE ENCOUNTER
Reason for Call:  Medication or medication refill:    Do you use a [facility]?  Name of the pharmacy and phone number for the current request:  [place] [address] [facility] (SPECIALTY) - [phone]    Name of the medication requested: adderall    Other request: none    Can we leave a detailed message on this number? YES    Phone number patient can be reached at: Home number on file [phone] (home)    Best Time: any    Call taken on 12/18/2018 at 11:55 AM by Sydnie Casiano

## 2018-12-18 NOTE — TELEPHONE ENCOUNTER
Dr. Nino provided 3 months of prescriptions on 11/9/18 and these were mailed to [facility] in [place].     Left voice message for patient that [facility] should have prescriptions on file for December and January refills and to call [facility] pharmacy to fill these.

## 2019-03-06 DIAGNOSIS — F41.8 DEPRESSION WITH ANXIETY: ICD-10-CM

## 2019-03-07 NOTE — TELEPHONE ENCOUNTER
"Requested Prescriptions   Pending Prescriptions Disp Refills     venlafaxine (EFFEXOR-XR) 150 MG 24 hr capsule [Pharmacy Med Name: VENLAFAXINE ER 150MG CAPSULES] 90 capsule 0    Last Written Prescription Date:  07/01/2018  Last Fill Quantity: 90,  # refills: 1   Last office visit: 9/26/2018 with prescribing provider:     Future Office Visit:   Sig: TAKE 1 CAPSULE(150 MG) BY MOUTH DAILY    Serotonin-Norepinephrine Reuptake Inhibitors  Failed - 3/6/2019  4:02 PM       Failed - Normal serum creatinine on file in past 12 months    Recent Labs   Lab Test 05/04/17  0955   CR 0.68            Passed - Blood pressure under 140/90 in past 12 months    BP Readings from Last 3 Encounters:   09/26/18 116/84   07/11/18 110/80   03/20/18 112/70                Passed - Recent (12 mo) or future (30 days) visit within the authorizing provider's specialty    Patient had office visit in the last 12 months or has a visit in the next 30 days with authorizing provider or within the authorizing provider's specialty.  See \"Patient Info\" tab in inbasket, or \"Choose Columns\" in Meds & Orders section of the refill encounter.             Passed - Medication is active on med list       Passed - Patient is age 18 or older       Passed - No active pregnancy on record       Passed - No positive pregnancy test in past 12 months        "

## 2019-03-08 ENCOUNTER — TELEPHONE (OUTPATIENT)
Dept: INTERNAL MEDICINE | Facility: CLINIC | Age: 29
End: 2019-03-08

## 2019-03-08 RX ORDER — VENLAFAXINE HYDROCHLORIDE 150 MG/1
CAPSULE, EXTENDED RELEASE ORAL
Qty: 90 CAPSULE | Refills: 0 | Status: SHIPPED | OUTPATIENT
Start: 2019-03-08 | End: 2019-03-27

## 2019-03-08 NOTE — TELEPHONE ENCOUNTER
Reason for Call:  Other prescription    Detailed comments: Pt was denied refill and pt would like to know if she needed to make an appt or if the rx will be refilled. Pt called pharmacy, and they said they sent a refill request fax over a few days ago    Phone Number Patient can be reached at: Cell number on file:    Telephone Information:   Mobile [phone]       Best Time: Any    Can we leave a detailed message on this number? YES    Call taken on 3/8/2019 at 11:35 AM by Earl Tabares

## 2019-03-08 NOTE — TELEPHONE ENCOUNTER
Received prescription refill request from [facility] on 3/6/19 for Venlafaxine. Refill request is still pending.     However, patient is due for 6 month medication follow-up.  Patient informed she is due for appointment.     See 3/6/19 refill encounter for Venlafaxine request.

## 2019-03-08 NOTE — TELEPHONE ENCOUNTER
Patient due for 6 month medication follow-up.  Contacted patient, future appointment scheduled.   Next 5 appointments (look out 90 days)    Mar 27, 2019  4:00 PM CDT  SHORT with Hemalatha Nino MD  [facility] ([facility] [address]  [facility] [address]  [phone]         Medication is being filled for 1 time refill only due to:  Future appointment scheduled

## 2019-03-27 ENCOUNTER — OFFICE VISIT (OUTPATIENT)
Dept: INTERNAL MEDICINE | Facility: CLINIC | Age: 29
End: 2019-03-27
Payer: COMMERCIAL

## 2019-03-27 VITALS
HEART RATE: 105 BPM | BODY MASS INDEX: 29.82 KG/M2 | RESPIRATION RATE: 12 BRPM | DIASTOLIC BLOOD PRESSURE: 72 MMHG | TEMPERATURE: 98.6 F | OXYGEN SATURATION: 99 % | SYSTOLIC BLOOD PRESSURE: 122 MMHG | WEIGHT: 190 LBS | HEIGHT: 67 IN

## 2019-03-27 DIAGNOSIS — Z79.899 CONTROLLED SUBSTANCE AGREEMENT SIGNED: ICD-10-CM

## 2019-03-27 DIAGNOSIS — F41.8 DEPRESSION WITH ANXIETY: ICD-10-CM

## 2019-03-27 DIAGNOSIS — F90.0 ADHD (ATTENTION DEFICIT HYPERACTIVITY DISORDER), INATTENTIVE TYPE: ICD-10-CM

## 2019-03-27 PROCEDURE — 99214 OFFICE O/P EST MOD 30 MIN: CPT | Performed by: INTERNAL MEDICINE

## 2019-03-27 PROCEDURE — 80307 DRUG TEST PRSMV CHEM ANLYZR: CPT | Mod: 90 | Performed by: INTERNAL MEDICINE

## 2019-03-27 PROCEDURE — 99000 SPECIMEN HANDLING OFFICE-LAB: CPT | Performed by: INTERNAL MEDICINE

## 2019-03-27 RX ORDER — VENLAFAXINE HYDROCHLORIDE 150 MG/1
CAPSULE, EXTENDED RELEASE ORAL
Qty: 90 CAPSULE | Refills: 1 | Status: SHIPPED | OUTPATIENT
Start: 2019-03-27 | End: 2019-09-03

## 2019-03-27 RX ORDER — DEXTROAMPHETAMINE SACCHARATE, AMPHETAMINE ASPARTATE MONOHYDRATE, DEXTROAMPHETAMINE SULFATE AND AMPHETAMINE SULFATE 3.75; 3.75; 3.75; 3.75 MG/1; MG/1; MG/1; MG/1
15 CAPSULE, EXTENDED RELEASE ORAL DAILY
Qty: 30 CAPSULE | Refills: 0 | Status: SHIPPED | OUTPATIENT
Start: 2019-05-23 | End: 2020-06-30

## 2019-03-27 RX ORDER — DEXTROAMPHETAMINE SACCHARATE, AMPHETAMINE ASPARTATE MONOHYDRATE, DEXTROAMPHETAMINE SULFATE AND AMPHETAMINE SULFATE 3.75; 3.75; 3.75; 3.75 MG/1; MG/1; MG/1; MG/1
15 CAPSULE, EXTENDED RELEASE ORAL DAILY
Qty: 30 CAPSULE | Refills: 0 | Status: SHIPPED | OUTPATIENT
Start: 2019-04-25 | End: 2019-03-27

## 2019-03-27 RX ORDER — DEXTROAMPHETAMINE SACCHARATE, AMPHETAMINE ASPARTATE MONOHYDRATE, DEXTROAMPHETAMINE SULFATE AND AMPHETAMINE SULFATE 3.75; 3.75; 3.75; 3.75 MG/1; MG/1; MG/1; MG/1
15 CAPSULE, EXTENDED RELEASE ORAL DAILY
Qty: 30 CAPSULE | Refills: 0 | Status: SHIPPED | OUTPATIENT
Start: 2019-03-27 | End: 2019-03-27

## 2019-03-27 ASSESSMENT — MIFFLIN-ST. JEOR: SCORE: 1629.46

## 2019-03-27 ASSESSMENT — PATIENT HEALTH QUESTIONNAIRE - PHQ9: SUM OF ALL RESPONSES TO PHQ QUESTIONS 1-9: 4

## 2019-03-27 NOTE — NURSING NOTE
"/72   Pulse 105   Temp 98.6  F (37  C) (Oral)   Resp 12   Ht 1.702 m (5' 7\")   Wt 86.2 kg (190 lb)   SpO2 99%   Breastfeeding? No   BMI 29.76 kg/m      "

## 2019-03-27 NOTE — PROGRESS NOTES
SUBJECTIVE:   Tessa Barajas is a 28 year old female who presents to clinic today for the following health issues:      Depression Followup    Status since last visit: Stable     See PHQ-9 for current symptoms.  Other associated symptoms: None    Complicating factors:   Significant life event:  No   Current substance abuse:  None  Anxiety or Panic symptoms:  No    PHQ 8/29/2017 3/20/2018 7/11/2018   PHQ-9 Total Score 12 3 5   Q9: Suicide Ideation Not at all Not at all Not at all     Depression   Reports her depression has been mild. Notes that she is switching between health insurance and the Effexor-XR for her new insurance is costly.      ADHD  Discontinued Adderall 15 mg two months ago. Notes that she usually takes it on the weekdays and does not take it during the weekend.     PHQ-9  English  PHQ-9   Any Language  Suicide Assessment Five-step Evaluation and Treatment (SAFE-T)    Amount of exercise or physical activity: minimal    Problems taking medications regularly: No    Medication side effects: none    Diet: regular (no restrictions)      Problem list and histories reviewed & adjusted, as indicated.  Additional history: as documented    Recent Labs   Lab Test 05/04/17  0955 06/18/15  1132 07/07/14  1501 06/17/13  1048 08/26/11  1448   ALT  --  31 24  --  16   CR 0.68 0.64 0.78  --  0.70   GFRESTIMATED >90  Non  GFR Calc   >90  Non  GFR Calc   >90  --  >90   GFRESTBLACK >90   GFR Calc   >90   GFR Calc   >90  --  >90   POTASSIUM 3.7 4.8 4.1  --  3.7   TSH  --  0.91  --  1.41 3.26        Reviewed and updated as needed this visit by clinical staff  Tobacco  Allergies  Meds  Med Hx  Surg Hx  Fam Hx  Soc Hx      Reviewed and updated as needed this visit by Provider         ROS:  CONSTITUTIONAL: NEGATIVE for fever, chills, change in weight  ENT/MOUTH: NEGATIVE for ear, mouth and throat problems  RESP: NEGATIVE for significant cough or SOB  CV:  "NEGATIVE for chest pain, palpitations or peripheral edema  PSYCHIATRIC: NEGATIVE for changes in mood or affect    This document serves as a record of the services and decisions personally performed and made by Hemalatha Nino MD. It was created on his behalf by Renée Peguero, a trained medical scribe. The creation of this document is based on the provider's statements to the medical scribe.  Renée Peguero March 27, 2019 5:00 PM       OBJECTIVE:     /72   Pulse 105   Temp 98.6  F (37  C) (Oral)   Resp 12   Ht 1.702 m (5' 7\")   Wt 86.2 kg (190 lb)   SpO2 99%   Breastfeeding? No   BMI 29.76 kg/m    Body mass index is 29.76 kg/m .  GENERAL: healthy, alert and no distress  NECK: no adenopathy, no asymmetry, masses, or scars and thyroid normal to palpation  RESP: lungs clear to auscultation - no rales, rhonchi or wheezes  CV: regular rate and rhythm, normal S1 S2, no S3 or S4, no murmur, click or rub, no peripheral edema and peripheral pulses strong  ABDOMEN: soft, nontender, no hepatosplenomegaly, no masses and bowel sounds normal  MS: no gross musculoskeletal defects noted, no edema  PSYCH: mentation appears normal, affect normal/bright    Diagnostic Test Results:  No results found for this or any previous visit (from the past 24 hour(s)).    ASSESSMENT/PLAN:   (F90.0) ADHD (attention deficit hyperactivity disorder), inattentive type  Comment: Three month refill  Plan: amphetamine-dextroamphetamine (ADDERALL XR) 15         MG 24 hr capsule, Drug  Screen Comprehensive ,         Urine with Reported Meds ([facility]) (Pain Care         Package), DISCONTINUED:         amphetamine-dextroamphetamine (ADDERALL XR) 15         MG 24 hr capsule, DISCONTINUED:         amphetamine-dextroamphetamine (ADDERALL XR) 15         MG 24 hr capsule            (Z79.899) Controlled substance agreement signed- okay 8/29/18  Comment: See above.  Plan: amphetamine-dextroamphetamine (ADDERALL XR) 15         MG 24 hr capsule, " DISCONTINUED:         amphetamine-dextroamphetamine (ADDERALL XR) 15         MG 24 hr capsule, DISCONTINUED:         amphetamine-dextroamphetamine (ADDERALL XR) 15         MG 24 hr capsule            (F41.8) Depression with anxiety  Comment: Stable  Plan: venlafaxine (EFFEXOR-XR) 150 MG 24 hr capsule        Continue with current medications  -pt to call insurance to find if tables are cheaper or a different dose since she is so stable on this medication; if not, recommend a PA    FUTURE APPOINTMENTS:       - Follow-up visit in 6 months    The information in this document, created by the medical scribe for me, accurately reflects the services I personally performed and the decisions made by me. I have reviewed and approved this document for accuracy.   March 27, 2019 5:10 PM     Hemalatha Nino MD  [facility]

## 2019-04-02 LAB — PAIN DRUG SCR UR W RPTD MEDS: NORMAL

## 2019-05-15 ENCOUNTER — OFFICE VISIT (OUTPATIENT)
Dept: INTERNAL MEDICINE | Facility: CLINIC | Age: 29
End: 2019-05-15
Payer: COMMERCIAL

## 2019-05-15 VITALS
WEIGHT: 191 LBS | OXYGEN SATURATION: 98 % | SYSTOLIC BLOOD PRESSURE: 124 MMHG | HEART RATE: 84 BPM | BODY MASS INDEX: 29.91 KG/M2 | RESPIRATION RATE: 18 BRPM | DIASTOLIC BLOOD PRESSURE: 92 MMHG | TEMPERATURE: 97.9 F

## 2019-05-15 DIAGNOSIS — J20.9 ACUTE BRONCHITIS WITH SYMPTOMS > 10 DAYS: Primary | ICD-10-CM

## 2019-05-15 PROCEDURE — 99213 OFFICE O/P EST LOW 20 MIN: CPT | Performed by: PHYSICIAN ASSISTANT

## 2019-05-15 RX ORDER — AZITHROMYCIN 250 MG/1
TABLET, FILM COATED ORAL
Qty: 6 TABLET | Refills: 0 | Status: SHIPPED | OUTPATIENT
Start: 2019-05-15 | End: 2020-06-30

## 2019-05-15 NOTE — PROGRESS NOTES
SUBJECTIVE:   Tessa Barajas is a 28 year old female who presents to clinic today for the following   health issues:      RESPIRATORY SYMPTOMS      Duration: 2 weeks    Description  nasal congestion, cough- Productive does not recall color, wheezing, ear pain bilateral and diarrhea    Severity: moderate    Accompanying signs and symptoms: None    History (predisposing factors):  None-    Feels symptoms are worsening with noting more chest congestion     Precipitating or alleviating factors: None    Therapies tried and outcome:  none      -------------------------------------    Additional history: as documented    Reviewed  and updated as needed this visit by clinical staff         Reviewed and updated as needed this visit by Provider  Allergies  Meds         Labs reviewed in EPIC    ROS:  Constitutional, HEENT, cardiovascular, pulmonary, gi and gu systems are negative, except as otherwise noted.    OBJECTIVE:     BP (!) 124/92 (BP Location: Left arm, Patient Position: Chair, Cuff Size: Adult Regular)   Pulse 84   Temp 97.9  F (36.6  C) (Oral)   Resp 18   Wt 86.6 kg (191 lb)   SpO2 98%   BMI 29.91 kg/m    Body mass index is 29.91 kg/m .  GENERAL: healthy, alert and no distress  HENT: normal cephalic/atraumatic, ear canals and TM's normal, nose and mouth without ulcers or lesions, oropharynx clear and oral mucous membranes moist  NECK: no adenopathy, no asymmetry, masses, or scars and thyroid normal to palpation  RESP: no rhonchi, no wheezes and bronchial breath sounds clear with coughing   CV: regular rates and rhythm and normal S1 S2, no S3 or S4  MS: no gross musculoskeletal defects noted, no edema  SKIN: no suspicious lesions or rashes    Diagnostic Test Results:  none     ASSESSMENT/PLAN:             1. Acute bronchitis with symptoms > 10 days    - azithromycin (ZITHROMAX) 250 MG tablet; Two tablets first day, then one tablet daily for four days.  Dispense: 6 tablet; Refill: 0    Given length of  symptoms treatment as above  Recommend mucinex for coughing too  Recheck prn not improving.         Benita Wright PA-C  [facility]

## 2019-05-24 ENCOUNTER — OFFICE VISIT (OUTPATIENT)
Dept: URGENT CARE | Facility: URGENT CARE | Age: 29
End: 2019-05-24
Payer: COMMERCIAL

## 2019-05-24 VITALS
TEMPERATURE: 98.4 F | HEART RATE: 104 BPM | SYSTOLIC BLOOD PRESSURE: 122 MMHG | OXYGEN SATURATION: 98 % | BODY MASS INDEX: 30.07 KG/M2 | WEIGHT: 192 LBS | DIASTOLIC BLOOD PRESSURE: 80 MMHG | RESPIRATION RATE: 16 BRPM

## 2019-05-24 DIAGNOSIS — J45.20 MILD INTERMITTENT REACTIVE AIRWAY DISEASE WITHOUT COMPLICATION: ICD-10-CM

## 2019-05-24 DIAGNOSIS — R05.9 COUGH: Primary | ICD-10-CM

## 2019-05-24 PROCEDURE — 99214 OFFICE O/P EST MOD 30 MIN: CPT | Performed by: FAMILY MEDICINE

## 2019-05-24 RX ORDER — PREDNISONE 20 MG/1
TABLET ORAL
Qty: 15 TABLET | Refills: 0 | Status: SHIPPED | OUTPATIENT
Start: 2019-05-24 | End: 2019-05-30

## 2019-05-24 RX ORDER — ALBUTEROL SULFATE 90 UG/1
2 AEROSOL, METERED RESPIRATORY (INHALATION) EVERY 4 HOURS PRN
Qty: 8.5 G | Refills: 1 | Status: SHIPPED | OUTPATIENT
Start: 2019-05-24 | End: 2019-05-24

## 2019-05-24 RX ORDER — CODEINE PHOSPHATE AND GUAIFENESIN 10; 100 MG/5ML; MG/5ML
1-2 SOLUTION ORAL EVERY 6 HOURS PRN
Qty: 120 ML | Refills: 0 | Status: SHIPPED | OUTPATIENT
Start: 2019-05-24 | End: 2020-06-30

## 2019-05-24 RX ORDER — BENZONATATE 100 MG/1
200 CAPSULE ORAL 3 TIMES DAILY PRN
Qty: 42 CAPSULE | Refills: 3 | Status: SHIPPED | OUTPATIENT
Start: 2019-05-24 | End: 2020-06-30

## 2019-05-24 NOTE — PROGRESS NOTES
SUBJECTIVE:   Tessa Barajas is a 28 year old female presenting with a chief complaint of cough (frequent coughing attacks, productive of greenish phlegm), wheezing, shortness of breath (feels like she needs to take deeper breaths).  No fevers.    .  .  Onset of symptoms was one month ago.  Course of illness is .    Severity severe coughing attacks.    Current and Associated symptoms: as listed above.   Treatment measures tried include Azithromycin, Mucinex.  Predisposing factors include none.  No sick contacts with cough.     Patient was evaluated at the [facility] on May 15, 2019, for a two-week history of cough.  The lung auscultation was clear.  Patient was given a Rx for azithromycin and was told to take Mucinex.      .    Past Medical History:   Diagnosis Date     Abnormal Pap smear 12/2011     Anxiety      Headache disorder      Current Outpatient Medications   Medication Sig Dispense Refill     levonorgestrel (MIRENA) 20 MCG/24HR IUD 1 each by Intrauterine route once.       venlafaxine (EFFEXOR-XR) 150 MG 24 hr capsule TAKE 1 CAPSULE(150 MG) BY MOUTH DAILY 90 capsule 1     amphetamine-dextroamphetamine (ADDERALL XR) 15 MG 24 hr capsule Take 1 capsule (15 mg) by mouth daily (Patient not taking: Reported on 5/24/2019) 30 capsule 0     azithromycin (ZITHROMAX) 250 MG tablet Two tablets first day, then one tablet daily for four days. (Patient not taking: Reported on 5/24/2019) 6 tablet 0     Social History     Tobacco Use     Smoking status: Former Smoker     Smokeless tobacco: Never Used     Tobacco comment: 2-3 cigs per day   Substance Use Topics     Alcohol use: Yes     Comment: occasionally-less once per week       ROS:  CONSTITUTIONAL:NEGATIVE for fever, chills, change in weight  INTEGUMENTARY/SKIN: NEGATIVE for worrisome rashes, moles or lesions  EYES: NEGATIVE for vision changes or irritation  ENT/MOUTH: POSITIVE for stuffy nose for the past four weeks.    RESP: positive for cough.       OBJECTIVE:  /80   Pulse 104   Temp 98.4  F (36.9  C) (Tympanic)   Resp 16   Wt 87.1 kg (192 lb)   SpO2 98%   BMI 30.07 kg/m    GENERAL APPEARANCE: healthy, alert and no distress  RESP: expiratory wheezes bilateral and throughout  CV: regular rates and rhythm, normal S1 S2, no murmur noted    ASSESSMENT:  Reactive Airway Disease  Cough    PLAN:  For the Reactive Airway Disease:  Rx:  Prednisone, Albuterol MDI  follow up with the primary care provider if not better 4-5 days.     Go to the emergency room if you develop worsening, severe shortness of breath.      Use an alternate method of birth control while on the prednisone.      If you develop too many side effects while on the three pills of prednisone at a time, reduce the dose to two pills of prednisone a day.      For the cough:  Rx;  Tessalon, Cheratussin AC  follow up with the primary care provider if not better in 4-5 days.     Kuldip Suarez MD

## 2019-05-25 RX ORDER — ALBUTEROL SULFATE 90 UG/1
AEROSOL, METERED RESPIRATORY (INHALATION)
Qty: 51 G | Refills: 1 | Status: SHIPPED | OUTPATIENT
Start: 2019-05-25 | End: 2020-08-09

## 2019-05-25 NOTE — TELEPHONE ENCOUNTER
Looks like this was prescribed yesterday. Will route to Dr. Suarez to look further into this.    Ambrocio Yusuf MA 3:11 PM 5/25/2019

## 2019-05-30 ENCOUNTER — OFFICE VISIT (OUTPATIENT)
Dept: INTERNAL MEDICINE | Facility: CLINIC | Age: 29
End: 2019-05-30
Payer: COMMERCIAL

## 2019-05-30 ENCOUNTER — ANCILLARY PROCEDURE (OUTPATIENT)
Dept: GENERAL RADIOLOGY | Facility: CLINIC | Age: 29
End: 2019-05-30
Attending: FAMILY MEDICINE
Payer: COMMERCIAL

## 2019-05-30 VITALS
WEIGHT: 193.1 LBS | OXYGEN SATURATION: 95 % | BODY MASS INDEX: 30.31 KG/M2 | DIASTOLIC BLOOD PRESSURE: 78 MMHG | HEIGHT: 67 IN | TEMPERATURE: 98.8 F | SYSTOLIC BLOOD PRESSURE: 120 MMHG | RESPIRATION RATE: 20 BRPM

## 2019-05-30 DIAGNOSIS — R06.02 SOB (SHORTNESS OF BREATH): ICD-10-CM

## 2019-05-30 DIAGNOSIS — R06.2 WHEEZING: ICD-10-CM

## 2019-05-30 DIAGNOSIS — R05.3 PERSISTENT COUGH FOR 3 WEEKS OR LONGER: Primary | ICD-10-CM

## 2019-05-30 PROCEDURE — 99214 OFFICE O/P EST MOD 30 MIN: CPT | Performed by: FAMILY MEDICINE

## 2019-05-30 PROCEDURE — 71046 X-RAY EXAM CHEST 2 VIEWS: CPT

## 2019-05-30 RX ORDER — MONTELUKAST SODIUM 10 MG/1
10 TABLET ORAL AT BEDTIME
Qty: 30 TABLET | Refills: 0 | Status: SHIPPED | OUTPATIENT
Start: 2019-05-30 | End: 2020-08-09

## 2019-05-30 RX ORDER — AZITHROMYCIN 250 MG/1
TABLET, FILM COATED ORAL
Qty: 6 TABLET | Refills: 0 | Status: SHIPPED | OUTPATIENT
Start: 2019-05-30 | End: 2019-06-04

## 2019-05-30 RX ORDER — CETIRIZINE HYDROCHLORIDE 10 MG/1
10 TABLET ORAL DAILY
Qty: 30 TABLET | Refills: 0 | Status: SHIPPED | OUTPATIENT
Start: 2019-05-30 | End: 2020-06-30

## 2019-05-30 RX ORDER — FLUTICASONE PROPIONATE 110 UG/1
1 AEROSOL, METERED RESPIRATORY (INHALATION) 2 TIMES DAILY
Qty: 1 INHALER | Refills: 0 | Status: SHIPPED | OUTPATIENT
Start: 2019-05-30 | End: 2020-06-30

## 2019-05-30 ASSESSMENT — MIFFLIN-ST. JEOR: SCORE: 1643.53

## 2019-05-30 ASSESSMENT — PATIENT HEALTH QUESTIONNAIRE - PHQ9: SUM OF ALL RESPONSES TO PHQ QUESTIONS 1-9: 8

## 2019-05-30 NOTE — PROGRESS NOTES
Subjective     Tessa Barajas is a 28 year old female who presents to clinic today for the following health issues:    HPI   RESPIRATORY SYMPTOMS       Duration: almost 4 weeks    Description  rhinorrhea, cough, wheezing, fatigue/malaise, hoarse voice, myalgias and chest hurts when breaths    Severity: moderate    Accompanying signs and symptoms: None    History (predisposing factors):  none    Precipitating or alleviating factors: None    Therapies tried and outcome:  rest and fluids treated with antibiotic, Prednisone, Tessalon, inhaler    SUBJECTIVE:   Tessa Barajas is a 28 year old female with history of anxiety, headache disorder presenting with a chief complaint of ongoing cough which is mostly persistent dry coughing sometimes is productive for last 5 weeks.  She has started noticing shortness of breath and wheezing symptoms for last 1 week.  Was seen twice and treated with antibiotic and also albuterol inhaler and prednisone.  And Robitussin with codeine.  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  She is slightly short of breath now and her cough sounds very coarse.  She denies any chest heaviness or any chest pain.  Has no fever or chills  She is an established patient of [facility].  Onset of symptoms was 5 week(s) ago.  Course of illness is worsening.    Severity moderate  Current and Associated symptoms: cough - non-productive and cough - productive  Treatment measures tried include OTC Cough med, Inhaler (name: alb) and prednisone and azithromycin   Predisposing factors include exposure to chemical .    Past Medical History:   Diagnosis Date     Abnormal Pap smear 12/2011     Anxiety      Headache disorder      Current Outpatient Medications   Medication Sig Dispense Refill     albuterol (PROAIR HFA/PROVENTIL HFA/VENTOLIN HFA) 108 (90 Base) MCG/ACT inhaler INHALE 2 PUFFS BY  MOUTH EVERY 4 HOURS AS NEEDED FOR SHORTNESS OF BREATH, DIFFICULTY BREATHING, OR WHEEZING 51 g 1     amphetamine-dextroamphetamine (ADDERALL XR) 15 MG 24 hr capsule Take 1 capsule (15 mg) by mouth daily 30 capsule 0     azithromycin (ZITHROMAX) 250 MG tablet Take 2 tablets (500 mg) by mouth daily for 1 day, THEN 1 tablet (250 mg) daily for 4 days. 6 tablet 0     benzonatate (TESSALON) 100 MG capsule Take 2 capsules (200 mg) by mouth 3 times daily as needed for cough 42 capsule 3     cetirizine (ZYRTEC) 10 MG tablet Take 1 tablet (10 mg) by mouth daily 30 tablet 0     fluticasone (FLOVENT HFA) 110 MCG/ACT inhaler Inhale 1 puff into the lungs 2 times daily 1 Inhaler 0     guaiFENesin-codeine (ROBITUSSIN AC) 100-10 MG/5ML solution Take 5-10 mLs by mouth every 6 hours as needed for cough 120 mL 0     levonorgestrel (MIRENA) 20 MCG/24HR IUD 1 each by Intrauterine route once.       montelukast (SINGULAIR) 10 MG tablet Take 1 tablet (10 mg) by mouth At Bedtime 30 tablet 0     venlafaxine (EFFEXOR-XR) 150 MG 24 hr capsule TAKE 1 CAPSULE(150 MG) BY MOUTH DAILY 90 capsule 1     azithromycin (ZITHROMAX) 250 MG tablet Two tablets first day, then one tablet daily for four days. (Patient not taking: Reported on 5/30/2019) 6 tablet 0     Social History     Tobacco Use     Smoking status: Former Smoker     Smokeless tobacco: Never Used     Tobacco comment: 2-3 cigs per day   Substance Use Topics     Alcohol use: Yes     Comment: occasionally-less once per week     Family History   Problem Relation Age of Onset     Family History Negative Mother      Family History Negative Father      No Known Problems Brother      No Known Problems Daughter          ROS:    10 point ROS of systems including Constitutional, Eyes,  Cardiovascular, Gastroenterology, Genitourinary, Integumentary, Muscularskeletal, Psychiatric were all negative except for pertinent positives noted in my HPI         OBJECTIVE:  /78 (BP Location: Left arm, Patient  "Position: Sitting, Cuff Size: Adult Regular)   Temp 98.8  F (37.1  C) (Oral)   Resp 20   Ht 1.702 m (5' 7\")   Wt 87.6 kg (193 lb 1.6 oz)   SpO2 95%   Breastfeeding? No   BMI 30.24 kg/m    GENERAL APPEARANCE: healthy, alert and no distress  EYES: EOMI,  PERRL, conjunctiva clear  HENT: ear canals and TM's normal.  Nose and mouth without ulcers, erythema or lesions  NECK: supple, nontender, no lymphadenopathy  RESP: lungs good air entry and positive for  rales, rhonchi or wheezes  CV: regular rates and rhythm, normal S1 S2, no murmur noted  ABDOMEN:  soft, nontender, no HSM or masses and bowel sounds normal  SKIN: no suspicious lesions or rashes  PSYCH: mentation appears normal  Physical Exam      X-Ray was done, my findings are:WNL, no infiltrate noted     Medical Decision Making:    Differential Diagnosis:  URI Adult/Peds:  Asthma exacerbation, Bronchitis-viral, Pneumonia, Tonsilitis and Viral pharyngitis/ cough variant asthma       ASSESSMENT:  Tessa was seen today for cough.    Diagnoses and all orders for this visit:    Persistent cough for 3 weeks or longer  -     XR Chest 2 Views  -     azithromycin (ZITHROMAX) 250 MG tablet; Take 2 tablets (500 mg) by mouth daily for 1 day, THEN 1 tablet (250 mg) daily for 4 days.    Wheezing  -     fluticasone (FLOVENT HFA) 110 MCG/ACT inhaler; Inhale 1 puff into the lungs 2 times daily  -     cetirizine (ZYRTEC) 10 MG tablet; Take 1 tablet (10 mg) by mouth daily  -     montelukast (SINGULAIR) 10 MG tablet; Take 1 tablet (10 mg) by mouth At Bedtime    SOB (shortness of breath)  -     fluticasone (FLOVENT HFA) 110 MCG/ACT inhaler; Inhale 1 puff into the lungs 2 times daily  -     montelukast (SINGULAIR) 10 MG tablet; Take 1 tablet (10 mg) by mouth At Bedtime          PLAN:  Tylenol, Ibuprofen, Fluids, Rest and Vaporizer  Discussed with patient his symptoms seem to be related to asthma variant cough which related to exposure to chemicals from her work.  I encourage " patient to wear a mask when she is around chemicals.  Patient has already made an appointment with an allergist and she will be following up in a week.  Started her on Flovent to take it as directed discussed with patient to rinse her mouth after every use  Advised patient also to start on cetirizine on a regular basis.  Follow up if  symptoms fail to improve or worsens   Pt understood and agreed with plan   Lisandra Villalobos MD     See orders in Epic

## 2019-06-24 ENCOUNTER — TRANSFERRED RECORDS (OUTPATIENT)
Dept: HEALTH INFORMATION MANAGEMENT | Facility: CLINIC | Age: 29
End: 2019-06-24

## 2019-07-15 ENCOUNTER — TELEPHONE (OUTPATIENT)
Dept: INTERNAL MEDICINE | Facility: CLINIC | Age: 29
End: 2019-07-15

## 2019-07-15 NOTE — LETTER
[facility]  [address], [address]  [place], [place]  [address]                                            TEL:[phone]  FAX:[phone]        Tessa Barajas  [address] [address] [address]      July 15, 2019    Dear Tessa,    At [facility] we care about your health and well-being.  A review of your chart has indicated that you are due for a follow up around September.  Please contact us at [phone] to schedule an appointment.      Sincerely,      Hemalatha Nino M.D.

## 2019-07-15 NOTE — TELEPHONE ENCOUNTER
Panel Management Review      Patient has the following on her problem list:     Depression / Dysthymia review    Measure:  Needs PHQ-9 score of 4 or less during index window.  Administer PHQ-9 and if score is 5 or more, send encounter to provider for next steps.    5 - 7 month window range: UTD    PHQ-9 SCORE 7/11/2018 3/27/2019 5/30/2019   PHQ-9 Total Score - - -   PHQ-9 Total Score 5 4 8       If PHQ-9 recheck is 5 or more, route to provider for next steps.    Patient is due for:  None      Composite cancer screening  Chart review shows that this patient is due/due soon for the following None  Summary:    Patient is due/failing the following:   ACT    Action needed:   Patient needs to do ACT, follow up visit    Type of outreach:    Sent letter.    Questions for provider review:    None                                                                                                                                    Ira Nance CMA       Chart routed to no one .

## 2019-09-03 DIAGNOSIS — F41.8 DEPRESSION WITH ANXIETY: ICD-10-CM

## 2019-09-04 NOTE — TELEPHONE ENCOUNTER
"Requested Prescriptions   Pending Prescriptions Disp Refills     venlafaxine (EFFEXOR-XR) 150 MG 24 hr capsule [Pharmacy Med Name: VENLAFAXINE ER 150MG CAPSULES] 90 capsule 0     Sig: TAKE 1 CAPSULE(150 MG) BY MOUTH DAILY   Last Written Prescription Date:  03/27/2019  Last Fill Quantity: 90,  # refills: 01   Last office visit: 5/30/2019 with prescribing provider:     Future Office Visit:      Serotonin-Norepinephrine Reuptake Inhibitors  Failed - 9/3/2019  5:16 PM        Failed - Normal serum creatinine on file in past 12 months     Recent Labs   Lab Test 05/04/17  0955   CR 0.68             Passed - Blood pressure under 140/90 in past 12 months     BP Readings from Last 3 Encounters:   05/30/19 120/78   05/24/19 122/80   05/15/19 (!) 124/92                 Passed - Recent (12 mo) or future (30 days) visit within the authorizing provider's specialty     Patient had office visit in the last 12 months or has a visit in the next 30 days with authorizing provider or within the authorizing provider's specialty.  See \"Patient Info\" tab in inbasket, or \"Choose Columns\" in Meds & Orders section of the refill encounter.              Passed - Medication is active on med list        Passed - Patient is age 18 or older        Passed - No active pregnancy on record        Passed - No positive pregnancy test in past 12 months        "

## 2019-09-05 RX ORDER — VENLAFAXINE HYDROCHLORIDE 150 MG/1
CAPSULE, EXTENDED RELEASE ORAL
Qty: 90 CAPSULE | Refills: 0 | Status: SHIPPED | OUTPATIENT
Start: 2019-09-05 | End: 2020-06-30

## 2019-10-31 ENCOUNTER — TELEPHONE (OUTPATIENT)
Dept: INTERNAL MEDICINE | Facility: CLINIC | Age: 29
End: 2019-10-31

## 2019-10-31 DIAGNOSIS — F33.1 MAJOR DEPRESSIVE DISORDER, RECURRENT EPISODE, MODERATE (H): Primary | ICD-10-CM

## 2019-10-31 RX ORDER — VENLAFAXINE HYDROCHLORIDE 37.5 MG/1
CAPSULE, EXTENDED RELEASE ORAL
Qty: 42 CAPSULE | Refills: 0 | Status: SHIPPED | OUTPATIENT
Start: 2019-10-31 | End: 2020-06-30

## 2019-10-31 NOTE — TELEPHONE ENCOUNTER
Spoke with patient.  States, since things are going well with her anxiety, she would like to taper off Effexor and see how things are without being on a medication.    If ok, please advise taper schedule.    Please advise, thanks.  (Patient aware primary care provider out of clinic today.)

## 2019-10-31 NOTE — TELEPHONE ENCOUNTER
Detailed message left on patient's voice mail with tapering directions and that a prescription sent to pharmacy.

## 2019-10-31 NOTE — TELEPHONE ENCOUNTER
Reason for call:  Other   Patient called regarding (reason for call): call back  Additional comments: to niranjan off medication    Phone number to reach patient:  Cell number on file:    Telephone Information:   Mobile [phone]       Best Time:  anytime    Can we leave a detailed message on this number?  YES

## 2019-11-12 NOTE — TELEPHONE ENCOUNTER
Referred by: Gregor De Leon DPM; Medical Diagnosis (from order):      Physical Therapy -  Daily Treatment Note    Visit: 2  Next referring provider appointment: 12/18/2019        SUBJECTIVE                                                                                                             Had issues with incision healing, done with wound care. Feels okay WBAT with cam boot. Gets custom shoe next Wednesday and last PT appt Thursday before [place]. Also getting a new technology artificial limb. Continues with boot and walker.      Pain / Symptoms:  Pain rating (out of 10): Current: 2     OBJECTIVE                                                                                                                     Observation:   Comments / Details: Much swelling to entire foot. Redness noted around incision, small scab remaining.         TREATMENT                                                                                                                  Therapeutic Exercise:  PROM ankle   Long sitting gastroc stretch with towel  Active ankle PF and DF  Ankle circles  Ankle PF and DF with green band  Toe scrunches 2 x 10  Standing hip abduction 2 x 10  Standing marches 2 x 10  Standing gastroc stretch  Manual Therapy:  Soft tissue mobilization ankle and gastroc  Edema massage    Home Exercise Program: (*above indicates provided as part of home exercise program)  Hip abduction and marches  Ankle PF/DF with green band  Long sitting and standing gastroc stretch  Toe scrunches      ASSESSMENT                                                                                                                 Patient doing well with boot, waiting for custom shoe appt next week. Tolerated session well with increased exercises. Encouraged purchase of skis for walker.     Pain/symptoms after session: 2  Patient Education:   Results of above outlined education: Verbalizes understanding and Demonstrates understanding  Script ready and in my outbox    thanks       PLAN                                                                                                                             Suggestions for next session as indicated: Strength and motion, standing balance, gait as able       Procedures and total treatment time documented Time Entry flowsheet.

## 2020-03-22 ENCOUNTER — HEALTH MAINTENANCE LETTER (OUTPATIENT)
Age: 30
End: 2020-03-22

## 2020-06-30 ENCOUNTER — VIRTUAL VISIT (OUTPATIENT)
Dept: INTERNAL MEDICINE | Facility: CLINIC | Age: 30
End: 2020-06-30
Payer: COMMERCIAL

## 2020-06-30 DIAGNOSIS — J45.20 INTERMITTENT ASTHMA WITHOUT COMPLICATION, UNSPECIFIED ASTHMA SEVERITY: ICD-10-CM

## 2020-06-30 DIAGNOSIS — F33.1 MAJOR DEPRESSIVE DISORDER, RECURRENT EPISODE, MODERATE (H): ICD-10-CM

## 2020-06-30 DIAGNOSIS — F90.0 ADHD (ATTENTION DEFICIT HYPERACTIVITY DISORDER), INATTENTIVE TYPE: Primary | ICD-10-CM

## 2020-06-30 PROCEDURE — 99214 OFFICE O/P EST MOD 30 MIN: CPT | Mod: 95 | Performed by: INTERNAL MEDICINE

## 2020-06-30 RX ORDER — DEXTROAMPHETAMINE SACCHARATE, AMPHETAMINE ASPARTATE MONOHYDRATE, DEXTROAMPHETAMINE SULFATE AND AMPHETAMINE SULFATE 5; 5; 5; 5 MG/1; MG/1; MG/1; MG/1
20 CAPSULE, EXTENDED RELEASE ORAL DAILY
Qty: 30 CAPSULE | Refills: 0 | Status: SHIPPED | OUTPATIENT
Start: 2020-06-30 | End: 2020-08-13

## 2020-06-30 ASSESSMENT — ANXIETY QUESTIONNAIRES
1. FEELING NERVOUS, ANXIOUS, OR ON EDGE: NOT AT ALL
5. BEING SO RESTLESS THAT IT IS HARD TO SIT STILL: NOT AT ALL
7. FEELING AFRAID AS IF SOMETHING AWFUL MIGHT HAPPEN: NOT AT ALL
6. BECOMING EASILY ANNOYED OR IRRITABLE: NOT AT ALL
3. WORRYING TOO MUCH ABOUT DIFFERENT THINGS: SEVERAL DAYS
GAD7 TOTAL SCORE: 1
2. NOT BEING ABLE TO STOP OR CONTROL WORRYING: NOT AT ALL
IF YOU CHECKED OFF ANY PROBLEMS ON THIS QUESTIONNAIRE, HOW DIFFICULT HAVE THESE PROBLEMS MADE IT FOR YOU TO DO YOUR WORK, TAKE CARE OF THINGS AT HOME, OR GET ALONG WITH OTHER PEOPLE: NOT DIFFICULT AT ALL

## 2020-06-30 ASSESSMENT — PATIENT HEALTH QUESTIONNAIRE - PHQ9
5. POOR APPETITE OR OVEREATING: NOT AT ALL
SUM OF ALL RESPONSES TO PHQ QUESTIONS 1-9: 2

## 2020-06-30 NOTE — PROGRESS NOTES
"Tessa Barajas is a 29 year old female who is being evaluated via a billable video visit.      The patient has been notified of following:     \"This video visit will be conducted via a call between you and your physician/provider. We have found that certain health care needs can be provided without the need for an in-person physical exam.  This service lets us provide the care you need with a video conversation.  If a prescription is necessary we can send it directly to your pharmacy.  If lab work is needed we can place an order for that and you can then stop by our lab to have the test done at a later time.    Video visits are billed at different rates depending on your insurance coverage.  Please reach out to your insurance provider with any questions.    If during the course of the call the physician/provider feels a video visit is not appropriate, you will not be charged for this service.\"    Patient has given verbal consent for Video visit? Yes  How would you like to obtain your AVS? Reece  Patient would like the video invitation sent by: Text to cell phone: [phone] (H)  Will anyone else be joining your video visit? No      Subjective     Tessa Barajas is a 29 year old female who presents today via video visit for the following health issues:    HPI     ADHD.   She has not taken adderall for a little while.  She changed from 5 days per week to 4 days per week. The patient reports that she works 10 hour days and has difficulty concentrating.  She has difficulty focusing at home also.  She would like to go back on adderall. The 15mg did not seem to work as well.   This was expensive last time through her insurance.   Of note, she is exercising regularly.  She is sleeping 6-8 hours per night.  She had been taking a break before on the weekends.  She is a histologist.     Depression and Anxiety Follow-Up    How are you doing with your depression since your last visit? No change    How are you doing with your " anxiety since your last visit?  No change    Are you having other symptoms that might be associated with depression or anxiety? No    Have you had a significant life event? No     Do you have any concerns with your use of alcohol or other drugs? No    Social History     Tobacco Use     Smoking status: Former Smoker     Packs/day: 0.00     Years: 0.00     Pack years: 0.00     Smokeless tobacco: Never Used     Tobacco comment: 2-3 cigs per day   Substance Use Topics     Alcohol use: Yes     Comment: occasionally-less once per week     Drug use: No     PHQ 3/27/2019 5/30/2019 6/30/2020   PHQ-9 Total Score 4 8 2   Q9: Thoughts of better off dead/self-harm past 2 weeks Not at all Not at all Not at all     MARVIN-7 SCORE 8/29/2017 7/11/2018 6/30/2020   Total Score - - -   Total Score 11 2 1       Asthma.  ACT of 25.  Triggered by Seasonal allergies.  One year ago she had a flare up with allergies.  She has not used singulair for 7-8 months.  No shortness of breath or cough.     Suicide Assessment Five-step Evaluation and Treatment (SAFE-T)      How many servings of fruits and vegetables do you eat daily?  0-1    On average, how many sweetened beverages do you drink each day (Examples: soda, juice, sweet tea, etc.  Do NOT count diet or artificially sweetened beverages)?   1    How many days per week do you exercise enough to make your heart beat faster? 4    How many minutes a day do you exercise enough to make your heart beat faster? 30 - 60    How many days per week do you miss taking your medication? 0        Video Start Time: 8:54am        Patient Active Problem List   Diagnosis     Headache disorder     Hyperlipidemia LDL goal <160     Normal pregnancy, first     Normal labor and delivery     Stye     Panic disorder without agoraphobia     Major depressive disorder, recurrent episode, moderate (H)     ADHD (attention deficit hyperactivity disorder), inattentive type     Controlled substance agreement signed- okay  12/22/17     Past Surgical History:   Procedure Laterality Date     ABDOMEN SURGERY  1995    had quarter removed     HC TOOTH EXTRACTION W/FORCEP  2011       Social History     Tobacco Use     Smoking status: Former Smoker     Packs/day: 0.00     Years: 0.00     Pack years: 0.00     Smokeless tobacco: Never Used     Tobacco comment: 2-3 cigs per day   Substance Use Topics     Alcohol use: Yes     Comment: occasionally-less once per week     Family History   Problem Relation Age of Onset     Family History Negative Mother      Family History Negative Father      No Known Problems Brother      No Known Problems Daughter            Reviewed and updated as needed this visit by Provider         Review of Systems   CONSTITUTIONAL: NEGATIVE for fever, chills, change in weight  RESP: NEGATIVE for significant cough or SOB  CV: NEGATIVE for chest pain, palpitations or peripheral edema      Objective             Physical Exam     GENERAL: Healthy, alert and no distress  EYES: Eyes grossly normal to inspection.  No discharge or erythema, or obvious scleral/conjunctival abnormalities.  RESP: No audible wheeze, cough, or visible cyanosis.  No visible retractions or increased work of breathing.    SKIN: Visible skin clear. No significant rash, abnormal pigmentation or lesions.  NEURO: Cranial nerves grossly intact.  Mentation and speech appropriate for age.  PSYCH: Mentation appears normal, affect normal/bright, judgement and insight intact, normal speech and appearance well-groomed.      Diagnostic Test Results:  Labs reviewed in Epic        Assessment & Plan       (F90.0) ADHD (attention deficit hyperactivity disorder), inattentive type  (primary encounter diagnosis)  Comment:   Plan: adderall    (F33.1) Major depressive disorder, recurrent episode, moderate (H)  Comment: at goal  Plan: monitor    (J45.20) Intermittent asthma without complication, unspecified asthma severity  Comment:   Plan: inahlers as needed       See Patient  Instructions    No follow-ups on file.    Hemalatha Nino MD  [facility]      Video-Visit Details    Type of service:  Video Visit    Video End Time:9:09am    Originating Location (pt. Location): 8:56am    Distant Location (provider location):  home    Platform used for Video Visit: doximity    No follow-ups on file.       Hemalatha Nino MD

## 2020-07-01 ASSESSMENT — ASTHMA QUESTIONNAIRES: ACT_TOTALSCORE: 25

## 2020-07-01 ASSESSMENT — ANXIETY QUESTIONNAIRES: GAD7 TOTAL SCORE: 1

## 2020-07-16 NOTE — PATIENT INSTRUCTIONS
follow up with the primary care provider if not better in 4-5 days.     Go to the emergency room if you develop worsening, severe shortness of breath.      Use an alternate method of birth control while on the prednisone.      If you develop too many side effects while on the three pills of prednisone at a time, reduce the dose to two pills of prednisone a day.         Yes

## 2020-08-09 ENCOUNTER — OFFICE VISIT (OUTPATIENT)
Dept: URGENT CARE | Facility: URGENT CARE | Age: 30
End: 2020-08-09
Payer: COMMERCIAL

## 2020-08-09 VITALS
SYSTOLIC BLOOD PRESSURE: 120 MMHG | RESPIRATION RATE: 18 BRPM | HEART RATE: 105 BPM | TEMPERATURE: 98.9 F | BODY MASS INDEX: 28.72 KG/M2 | DIASTOLIC BLOOD PRESSURE: 95 MMHG | WEIGHT: 183 LBS | OXYGEN SATURATION: 99 % | HEIGHT: 67 IN

## 2020-08-09 DIAGNOSIS — J03.90 TONSILLITIS: Primary | ICD-10-CM

## 2020-08-09 DIAGNOSIS — J02.9 SORE THROAT: ICD-10-CM

## 2020-08-09 LAB
DEPRECATED S PYO AG THROAT QL EIA: NEGATIVE
SPECIMEN SOURCE: NORMAL

## 2020-08-09 PROCEDURE — 99213 OFFICE O/P EST LOW 20 MIN: CPT | Performed by: FAMILY MEDICINE

## 2020-08-09 PROCEDURE — 87651 STREP A DNA AMP PROBE: CPT | Performed by: NURSE PRACTITIONER

## 2020-08-09 PROCEDURE — 40001204 ZZHCL STATISTIC STREP A RAPID: Performed by: NURSE PRACTITIONER

## 2020-08-09 RX ORDER — AZITHROMYCIN 250 MG/1
TABLET, FILM COATED ORAL
Qty: 6 TABLET | Refills: 0 | Status: SHIPPED | OUTPATIENT
Start: 2020-08-09 | End: 2020-08-14

## 2020-08-09 ASSESSMENT — MIFFLIN-ST. JEOR: SCORE: 1592.71

## 2020-08-09 NOTE — PROGRESS NOTES
"SUBJECTIVE:Tessa Barajas is a 29 year old female with a chief complaint of sore throat.    Onset of symptoms was day(s) ago.    Course of illness: still present.    Severity moderate  Current and Associated symptoms: ear pain right  Treatment measures tried include None tried.  Predisposing factors include None.    Past Medical History:   Diagnosis Date     Abnormal Pap smear 12/2011     Anxiety      Headache disorder      Intermittent asthma without complication, unspecified asthma severity 6/30/2020     Allergies   Allergen Reactions     Amoxicillin      Rash as a child     Cefzil [Cefprozil]      Rash as a child     Social History     Tobacco Use     Smoking status: Former Smoker     Packs/day: 0.00     Years: 0.00     Pack years: 0.00     Smokeless tobacco: Never Used     Tobacco comment: 2-3 cigs per day   Substance Use Topics     Alcohol use: Yes     Comment: occasionally-less once per week       ROS:  SKIN: no rash  GI: no vomiting    OBJECTIVE:   BP (!) 120/95 (BP Location: Right arm, Patient Position: Sitting, Cuff Size: Adult Regular)   Pulse 105   Temp 98.9  F (37.2  C) (Tympanic)   Resp 18   Ht 1.702 m (5' 7\")   Wt 83 kg (183 lb)   SpO2 99%   BMI 28.66 kg/m  GENERAL APPEARANCE: healthy, alert and no distress  EYES: EOMI,  PERRL, conjunctiva clear  HENT: ear canals and TM's normal.  Nose normal.  Pharynx erythematous with some exudate noted.  NECK: supple, non-tender to palpation, no adenopathy noted  RESP: lungs clear to auscultation - no rales, rhonchi or wheezes  SKIN: no suspicious lesions or rashes    Rapid Strep test is negative; await throat culture results.      ICD-10-CM    1. Tonsillitis  J03.90 azithromycin (ZITHROMAX) 250 MG tablet   2. Sore throat  J02.9 Streptococcus A Rapid Scr w Reflx to PCR     Group A Streptococcus PCR Throat Swab       Symptomatic treat with gargles, lozenges, and OTC analgesic as needed.  Follow-up with primary clinic if not improving.     "

## 2020-08-10 LAB
SPECIMEN SOURCE: NORMAL
STREP GROUP A PCR: NOT DETECTED

## 2020-08-13 DIAGNOSIS — F90.0 ADHD (ATTENTION DEFICIT HYPERACTIVITY DISORDER), INATTENTIVE TYPE: ICD-10-CM

## 2020-08-13 RX ORDER — DEXTROAMPHETAMINE SACCHARATE, AMPHETAMINE ASPARTATE MONOHYDRATE, DEXTROAMPHETAMINE SULFATE AND AMPHETAMINE SULFATE 5; 5; 5; 5 MG/1; MG/1; MG/1; MG/1
20 CAPSULE, EXTENDED RELEASE ORAL DAILY
Qty: 30 CAPSULE | Refills: 0 | Status: SHIPPED | OUTPATIENT
Start: 2020-08-13 | End: 2021-05-19

## 2020-08-13 NOTE — TELEPHONE ENCOUNTER
Controlled Substance Refill Request for Adderall XR  Problem List Complete:    Yes    Last Written Prescription Date:  6/30/20  Last Fill Quantity: 30,   # refills: 0    THE MOST RECENT OFFICE VISIT MUST BE WITHIN THE PAST 3 MONTHS. AT LEAST ONE FACE TO FACE VISIT MUST OCCUR EVERY 6 MONTHS. ADDITIONAL VISITS CAN BE VIRTUAL.  (THIS STATEMENT SHOULD BE DELETED.)    Last Office Visit with [facility] primary care provider: 6/30/20    Future Office visit:     Controlled substance agreement:   Encounter-Level CSA - 04/13/2016:    Controlled Substance Agreement - Scan on 4/28/2016  9:38 AM: [place] Controlled Substance Agreement, 4/25/16     Patient-Level CSA:    There are no patient-level csa.         Last Urine Drug Screen:   Pain Drug SCR UR W RPTD Meds   Date Value Ref Range Status   03/27/2019 FINAL  Final     Comment:     (Note)  ====================================================================  [facility] COMP DRUG ANALYSIS,UR  ====================================================================  Test                             Result       Flag       Units        Drug Present   Venlafaxine                    PRESENT                               Desmethylvenlafaxine           PRESENT                                Desmethylvenlafaxine is an expected metabolite of venlafaxine.   Ibuprofen                      PRESENT                              ====================================================================  Test                      Result    Flag   Units      Ref Range        Creatinine              272              mg/dL      >=20            ====================================================================  Declared Medications:  Medication list was not provided.  ====================================================================  For clinical consultation, please call [phone].  ====================================================================  Analysis performed by [facility], [address]  [place] [address]     , No results found for: COMDAT, No results found for: THC13, PCP13, COC13, MAMP13, OPI13, AMP13, BZO13, TCA13, MTD13, BAR13, OXY13, PPX13, BUP13     Processing:  Rx to be electronically transmitted to pharmacy by provider     [email or web address]   checked in past 3 months?  Yes checked today.  No concerns.  Adderall XR last filled 6/30/20, #30

## 2020-10-19 LAB
BASOPHILS # BLD AUTO: 0 10E9/L (ref 0–0.2)
BASOPHILS NFR BLD AUTO: 0.4 %
DIFFERENTIAL METHOD BLD: NORMAL
EOSINOPHIL # BLD AUTO: 0.2 10E9/L (ref 0–0.7)
EOSINOPHIL NFR BLD AUTO: 2.2 %
ERYTHROCYTE [DISTWIDTH] IN BLOOD BY AUTOMATED COUNT: 12.7 % (ref 10–15)
HCT VFR BLD AUTO: 44.3 % (ref 35–47)
HGB BLD-MCNC: 14.7 G/DL (ref 11.7–15.7)
IMM GRANULOCYTES # BLD: 0 10E9/L (ref 0–0.4)
IMM GRANULOCYTES NFR BLD: 0.3 %
LYMPHOCYTES # BLD AUTO: 2.4 10E9/L (ref 0.8–5.3)
LYMPHOCYTES NFR BLD AUTO: 24 %
MCH RBC QN AUTO: 31.5 PG (ref 26.5–33)
MCHC RBC AUTO-ENTMCNC: 33.2 G/DL (ref 31.5–36.5)
MCV RBC AUTO: 95 FL (ref 78–100)
MONOCYTES # BLD AUTO: 0.8 10E9/L (ref 0–1.3)
MONOCYTES NFR BLD AUTO: 8.4 %
NEUTROPHILS # BLD AUTO: 6.4 10E9/L (ref 1.6–8.3)
NEUTROPHILS NFR BLD AUTO: 64.7 %
NRBC # BLD AUTO: 0 10*3/UL
NRBC BLD AUTO-RTO: 0 /100
PLATELET # BLD AUTO: 270 10E9/L (ref 150–450)
RBC # BLD AUTO: 4.66 10E12/L (ref 3.8–5.2)
WBC # BLD AUTO: 9.8 10E9/L (ref 4–11)

## 2020-10-19 PROCEDURE — 85379 FIBRIN DEGRADATION QUANT: CPT | Performed by: EMERGENCY MEDICINE

## 2020-10-19 PROCEDURE — 85025 COMPLETE CBC W/AUTO DIFF WBC: CPT | Performed by: EMERGENCY MEDICINE

## 2020-10-19 PROCEDURE — 99285 EMERGENCY DEPT VISIT HI MDM: CPT | Mod: 25

## 2020-10-19 PROCEDURE — 80048 BASIC METABOLIC PNL TOTAL CA: CPT | Performed by: EMERGENCY MEDICINE

## 2020-10-19 PROCEDURE — C9803 HOPD COVID-19 SPEC COLLECT: HCPCS

## 2020-10-19 PROCEDURE — 93005 ELECTROCARDIOGRAM TRACING: CPT

## 2020-10-19 PROCEDURE — 84484 ASSAY OF TROPONIN QUANT: CPT | Performed by: EMERGENCY MEDICINE

## 2020-10-19 ASSESSMENT — MIFFLIN-ST. JEOR: SCORE: 1579.1

## 2020-10-20 ENCOUNTER — APPOINTMENT (OUTPATIENT)
Dept: GENERAL RADIOLOGY | Facility: CLINIC | Age: 30
End: 2020-10-20
Attending: EMERGENCY MEDICINE
Payer: COMMERCIAL

## 2020-10-20 ENCOUNTER — HOSPITAL ENCOUNTER (EMERGENCY)
Facility: CLINIC | Age: 30
Discharge: HOME | End: 2020-10-20
Attending: EMERGENCY MEDICINE | Admitting: EMERGENCY MEDICINE
Payer: COMMERCIAL

## 2020-10-20 VITALS
DIASTOLIC BLOOD PRESSURE: 78 MMHG | HEART RATE: 84 BPM | TEMPERATURE: 97.4 F | WEIGHT: 180 LBS | HEIGHT: 67 IN | SYSTOLIC BLOOD PRESSURE: 118 MMHG | BODY MASS INDEX: 28.25 KG/M2 | OXYGEN SATURATION: 99 % | RESPIRATION RATE: 20 BRPM

## 2020-10-20 DIAGNOSIS — R07.9 CHEST PAIN, UNSPECIFIED TYPE: ICD-10-CM

## 2020-10-20 DIAGNOSIS — M79.602 PAIN OF LEFT UPPER EXTREMITY: ICD-10-CM

## 2020-10-20 LAB
ANION GAP SERPL CALCULATED.3IONS-SCNC: 5 MMOL/L (ref 3–14)
B-HCG FREE SERPL-ACNC: <5 IU/L
BUN SERPL-MCNC: 10 MG/DL (ref 7–30)
CALCIUM SERPL-MCNC: 8.6 MG/DL (ref 8.5–10.1)
CHLORIDE SERPL-SCNC: 106 MMOL/L (ref 94–109)
CO2 SERPL-SCNC: 26 MMOL/L (ref 20–32)
CREAT SERPL-MCNC: 0.8 MG/DL (ref 0.52–1.04)
D DIMER PPP FEU-MCNC: <0.3 UG/ML FEU (ref 0–0.5)
GFR SERPL CREATININE-BSD FRML MDRD: >90 ML/MIN/{1.73_M2}
GLUCOSE SERPL-MCNC: 92 MG/DL (ref 70–99)
INTERPRETATION ECG - MUSE: NORMAL
POTASSIUM SERPL-SCNC: 3.9 MMOL/L (ref 3.4–5.3)
SARS-COV-2 RNA SPEC QL NAA+PROBE: NOT DETECTED
SODIUM SERPL-SCNC: 137 MMOL/L (ref 133–144)
SPECIMEN SOURCE: NORMAL
TROPONIN I SERPL-MCNC: <0.015 UG/L (ref 0–0.04)

## 2020-10-20 PROCEDURE — 84702 CHORIONIC GONADOTROPIN TEST: CPT

## 2020-10-20 PROCEDURE — 71045 X-RAY EXAM CHEST 1 VIEW: CPT

## 2020-10-20 PROCEDURE — U0003 INFECTIOUS AGENT DETECTION BY NUCLEIC ACID (DNA OR RNA); SEVERE ACUTE RESPIRATORY SYNDROME CORONAVIRUS 2 (SARS-COV-2) (CORONAVIRUS DISEASE [COVID-19]), AMPLIFIED PROBE TECHNIQUE, MAKING USE OF HIGH THROUGHPUT TECHNOLOGIES AS DESCRIBED BY CMS-2020-01-R: HCPCS | Performed by: EMERGENCY MEDICINE

## 2020-10-20 ASSESSMENT — ENCOUNTER SYMPTOMS
DIZZINESS: 0
COUGH: 0
COLOR CHANGE: 0
NAUSEA: 0
DIARRHEA: 1
HEADACHES: 0
SORE THROAT: 0
VOMITING: 0
NUMBNESS: 0
SHORTNESS OF BREATH: 1

## 2020-10-20 NOTE — ED AVS SNAPSHOT
[facility] Emergency Dept  [address]  [facility] [address]  Phone: [phone]  Fax: [phone]                                    Tessa Barajas   MRN: [id number]    Department: [facility] Emergency Dept   Date of Visit: 10/19/2020           After Visit Summary Signature Page    I have received my discharge instructions, and my questions have been answered. I have discussed any challenges I see with this plan with the nurse or doctor.    ..........................................................................................................................................  Patient/Patient Representative Signature      ..........................................................................................................................................  Patient Representative Print Name and Relationship to Patient    ..................................................               ................................................  Date                                   Time    ..........................................................................................................................................  Reviewed by Signature/Title    ...................................................              ..............................................  Date                                               Time          22EPIC Rev 08/18

## 2020-10-20 NOTE — LETTER
October 20, 2020      To Whom It May Concern:      Tessa Barajas was seen in our Emergency Department today, 10/20/20.  I expect her condition to improve over the next 2-3 days.  She may return to work/school when covid results come back negative    Sincerely,        Lois Campos RN

## 2020-10-20 NOTE — ED TRIAGE NOTES
Here for bilateral chest pain started around 5pm radiating to left arm associated with some sob. Taking deep breath worsen pain. Denies any recent long travel. ABCs intact.

## 2020-10-20 NOTE — ED PROVIDER NOTES
"  History     Chief Complaint:  Chest Pain     HPI   Tessa Barajas is a 29 year old female who presents with concerns for left arm pain began around 5 PM while playing Yahtzee with no clear trigger with associated diffuse chest pain beginning soon after.  The pains are both described as achy.  No modifying factors.  No associated numbness or tingling of the affected extremity.  No color change or swelling of the left upper extremity.  Patient notes she feels \"a little\" short of breath \"but maybe is due to the pain\".  She denies cough or congestion.  She denies headache or sore throat.  She notes exposure to someone who might have Covid and is concerned about this possibility.  She does have mild diarrhea today.  She denies nausea or vomiting.  She denies dizziness.  She denies similar chest pain in the past.  She denies leg swelling or pain.  She does not think she is pregnant.    Cardiac/PE/DVT Risk Factors:  History of hypertension - Negative   History of hyperlipidemia - Positive   History of diabetes - Negative   History of smoking - Positive, former   Personal history of PE/DVT - Negative   Recent surgery - Negative   Cancer - Negative     Allergies:  Amoxicillin   Cefzil      Medications:    Adderall  Mirena IUD     Past Medical History:    Abnormal Pap smear   Anxiety   Attention deficit hyperactivity disorder, inattentive type   Depression   Hyperlipidemia   Intermittent asthma   Migraines   Panic disorder     Past Surgical History:    Abdominal surgery   Tooth extraction     Family History:    Family history reviewed. No pertinent family history.     Social History:  Smoking Status: Former smoker   Smokeless Tobacco: Never used   Alcohol Use: Positive  Drug Use: Negative   PCP: Hemalatha Nino   Marital Status: Single      Review of Systems   HENT: Negative for congestion and sore throat.    Respiratory: Positive for shortness of breath. Negative for cough.    Cardiovascular: Positive for chest pain. " "Negative for leg swelling.   Gastrointestinal: Positive for diarrhea. Negative for nausea and vomiting.   Musculoskeletal:        Arm pain - no swelling, no leg pain   Skin: Negative for color change.   Neurological: Negative for dizziness, numbness and headaches.   All other systems reviewed and are negative.      Physical Exam     Patient Vitals for the past 24 hrs:   BP Temp Temp src Pulse Resp SpO2 Height Weight   10/20/20 0137 118/78 -- -- 84 20 99 % -- --   10/20/20 0022 -- -- -- -- -- 100 % -- --   10/20/20 0021 (!) 125/101 -- -- 89 -- -- -- --   10/19/20 2325 (!) 146/105 97.4  F (36.3  C) Temporal 96 18 100 % 1.702 m (5' 7\") 81.6 kg (180 lb)     Physical Exam  General: Adult female sitting upright  Eyes: PERRL, Conjunctive within normal limits  ENT: Moist mucous membranes, oropharynx clear.   CV: Normal S1S2, no murmur, rub or gallop. Regular rate and rhythm  Resp: Clear to auscultation bilaterally, no wheezes, rales or rhonchi. Normal respiratory effort.  GI: Abdomen is soft, nontender and nondistended. No palpable masses. No rebound or guarding.  MSK: No edema. Nontender. Normal active range of motion. No calf asymmetry or tenderness.   Skin: Warm and dry. No rashes or lesions or ecchymoses on visible skin.  Neuro: Alert and oriented. Responds appropriately to all questions and commands. No focal findings appreciated. Normal muscle tone.  Psych: Normal mood and affect. Pleasant.    Emergency Department Course     ECG:  ECG taken at 2333  Sinus rhythm with sinus arrhythmia   Normal ECG  Rate 86 bpm. NC interval 150 ms. QRS duration 88 ms. QT/QTc 368/440 ms. P-R-T axes 41 55 38.    Imaging:  Radiology findings were communicated with the patient who voiced understanding of the findings.    XR Chest Port 1 View  Negative chest.  Reading per radiology     Laboratory:  Laboratory findings were communicated with the patient who voiced understanding of the findings.    CBC: WBC 9.8, HGB 14.7,   BMP: WNL " (Creatinine 0.80)  Troponin (Collected 2344): <0.015    D dimer quantitative: <0.3     Symptomatic COVID-19 virus (Coronavirus) by PCR In process      Emergency Department Course:    2333 EKG taken as documented above.     2344 IV was inserted and blood was drawn for laboratory testing, results above.    0008 Nursing notes and vitals reviewed.  I performed an exam of the patient as documented above.     0123 Portable chest XR taken, results above.     Patient was swabbed for COVID-19.     0220 Findings and plan explained to the patient. She feels reassured and is currently comfortable. She denies any new concerns.  Patient discharged home with instructions regarding supportive care, medications, and reasons to return. The importance of close follow-up was reviewed.     Impression & Plan      Medical Decision Making:  Tessa Barajas presents with chest pain.  The work up in the emergency department is negative.  The differential diagnosis of chest pain is broad and includes life threatening etiologies such as acute coronary syndrome,  pulmonary embolism, and acute aortic dissection.  Other causes may include pneumonia, pneumothorax, pericarditis, pleurisy, and esophageal spasm.  No serious etiology for the chest pain was detected today during this visit.  She is low risk for acute coronary syndrome and pulmonary embolism.  Troponin and D-dimer are within normal range.  X-ray did not show any acute pathology such as widened mediastinum or acute lung abnormality.  She had no concerning findings on evaluation today.  Given her low risk status with normal screening test I feel comfortable discharging the patient home.  Other etiologies including musculoskeletal or gastrointestinal are more likely.  She had no signs of DVT and suspicion is low the left upper extremity without objective findings and normal D-dimer.  She was neurovascular intact from the standpoint of the left upper extremity.  She understands the need for  follow-up should her symptoms persist with her PCP within 3 days.  Return to me to the emergency department worsening.  All question answered prior to discharge.      Covid-19  Tessa Barajas was evaluated during a global COVID-19 pandemic, which necessitated consideration that the patient might be at risk for infection with the SARS-CoV-2 virus that causes COVID-19.   Applicable protocols for evaluation were followed during the patient's care.   COVID-19 was considered as part of the patient's evaluation. The plan for testing is:  a test was obtained during this visit.    Diagnosis:    ICD-10-CM    1. Chest pain, unspecified type  R07.9    2. Pain of left upper extremity  M79.602      Disposition:   Discharged to home.      Scribe Disclosure:  Bijal JIMÉNEZ, am serving as a scribe at 12:13 AM on 10/20/2020 to document services personally performed by Dania Goodwin MD based on my observations and the provider's statements to me.      [facility] EMERGENCY DEPT       Dania Goodwin MD  10/20/20 0352

## 2021-01-15 ENCOUNTER — HEALTH MAINTENANCE LETTER (OUTPATIENT)
Age: 31
End: 2021-01-15

## 2021-05-16 ENCOUNTER — HEALTH MAINTENANCE LETTER (OUTPATIENT)
Age: 31
End: 2021-05-16

## 2021-05-19 ENCOUNTER — OFFICE VISIT (OUTPATIENT)
Dept: INTERNAL MEDICINE | Facility: CLINIC | Age: 31
End: 2021-05-19
Payer: COMMERCIAL

## 2021-05-19 VITALS
TEMPERATURE: 98.3 F | SYSTOLIC BLOOD PRESSURE: 126 MMHG | HEIGHT: 67 IN | HEART RATE: 113 BPM | DIASTOLIC BLOOD PRESSURE: 82 MMHG | WEIGHT: 198.3 LBS | BODY MASS INDEX: 31.12 KG/M2 | OXYGEN SATURATION: 97 %

## 2021-05-19 DIAGNOSIS — Z00.00 ROUTINE HISTORY AND PHYSICAL EXAMINATION OF ADULT: ICD-10-CM

## 2021-05-19 DIAGNOSIS — F33.1 MAJOR DEPRESSIVE DISORDER, RECURRENT EPISODE, MODERATE (H): Primary | ICD-10-CM

## 2021-05-19 DIAGNOSIS — J45.20 INTERMITTENT ASTHMA WITHOUT COMPLICATION, UNSPECIFIED ASTHMA SEVERITY: ICD-10-CM

## 2021-05-19 DIAGNOSIS — R87.612 LGSIL ON PAP SMEAR OF CERVIX: ICD-10-CM

## 2021-05-19 LAB
CHOLEST SERPL-MCNC: 192 MG/DL
HDLC SERPL-MCNC: 51 MG/DL
HGB BLD-MCNC: 13.9 G/DL (ref 11.7–15.7)
LDLC SERPL CALC-MCNC: 118 MG/DL
NONHDLC SERPL-MCNC: 141 MG/DL
TRIGL SERPL-MCNC: 114 MG/DL

## 2021-05-19 PROCEDURE — 87624 HPV HI-RISK TYP POOLED RSLT: CPT | Performed by: INTERNAL MEDICINE

## 2021-05-19 PROCEDURE — 80053 COMPREHEN METABOLIC PANEL: CPT | Performed by: INTERNAL MEDICINE

## 2021-05-19 PROCEDURE — 99395 PREV VISIT EST AGE 18-39: CPT | Performed by: INTERNAL MEDICINE

## 2021-05-19 PROCEDURE — 88175 CYTOPATH C/V AUTO FLUID REDO: CPT | Performed by: INTERNAL MEDICINE

## 2021-05-19 PROCEDURE — 84443 ASSAY THYROID STIM HORMONE: CPT | Performed by: INTERNAL MEDICINE

## 2021-05-19 PROCEDURE — 96127 BRIEF EMOTIONAL/BEHAV ASSMT: CPT | Performed by: INTERNAL MEDICINE

## 2021-05-19 PROCEDURE — 80061 LIPID PANEL: CPT | Performed by: INTERNAL MEDICINE

## 2021-05-19 PROCEDURE — 85018 HEMOGLOBIN: CPT | Performed by: INTERNAL MEDICINE

## 2021-05-19 PROCEDURE — 36415 COLL VENOUS BLD VENIPUNCTURE: CPT | Performed by: INTERNAL MEDICINE

## 2021-05-19 PROCEDURE — 99214 OFFICE O/P EST MOD 30 MIN: CPT | Mod: 25 | Performed by: INTERNAL MEDICINE

## 2021-05-19 RX ORDER — ALBUTEROL SULFATE 90 UG/1
2 AEROSOL, METERED RESPIRATORY (INHALATION) EVERY 6 HOURS
Qty: 8.5 G | Refills: 1 | Status: SHIPPED | OUTPATIENT
Start: 2021-05-19 | End: 2022-09-08

## 2021-05-19 ASSESSMENT — PATIENT HEALTH QUESTIONNAIRE - PHQ9
SUM OF ALL RESPONSES TO PHQ QUESTIONS 1-9: 12
5. POOR APPETITE OR OVEREATING: SEVERAL DAYS

## 2021-05-19 ASSESSMENT — ANXIETY QUESTIONNAIRES
5. BEING SO RESTLESS THAT IT IS HARD TO SIT STILL: SEVERAL DAYS
7. FEELING AFRAID AS IF SOMETHING AWFUL MIGHT HAPPEN: MORE THAN HALF THE DAYS
1. FEELING NERVOUS, ANXIOUS, OR ON EDGE: NEARLY EVERY DAY
GAD7 TOTAL SCORE: 12
IF YOU CHECKED OFF ANY PROBLEMS ON THIS QUESTIONNAIRE, HOW DIFFICULT HAVE THESE PROBLEMS MADE IT FOR YOU TO DO YOUR WORK, TAKE CARE OF THINGS AT HOME, OR GET ALONG WITH OTHER PEOPLE: VERY DIFFICULT
3. WORRYING TOO MUCH ABOUT DIFFERENT THINGS: MORE THAN HALF THE DAYS
2. NOT BEING ABLE TO STOP OR CONTROL WORRYING: MORE THAN HALF THE DAYS
6. BECOMING EASILY ANNOYED OR IRRITABLE: SEVERAL DAYS

## 2021-05-19 ASSESSMENT — MIFFLIN-ST. JEOR: SCORE: 1657.11

## 2021-05-19 NOTE — PROGRESS NOTES
SUBJECTIVE:   CC: Tessa Barajas is an 30 year old woman who presents for preventive health visit.       Patient has been advised of split billing requirements and indicates understanding: Yes  Healthy Habits:    Do you get at least three servings of calcium containing foods daily (dairy, green leafy vegetables, etc.)? yes    Amount of exercise or daily activities, outside of work: 2 day(s) per week    Problems taking medications regularly No    Medication side effects: No    Have you had an eye exam in the past two years? no    Do you see a dentist twice per year? yes    Do you have sleep apnea, excessive snoring or daytime drowsiness?no      Hyperlipidemia Follow-Up      Are you regularly taking any medication or supplement to lower your cholesterol?   No    Are you having muscle aches or other side effects that you think could be caused by your cholesterol lowering medication?  NA    Depression and Anxiety Follow-Up    How are you doing with your depression since your last visit? Worsened , patient was on Effexor in the past but stopped taking 1 year ago and symptoms have worsened since 2 month,     How are you doing with your anxiety since your last visit?  Worsened     Are you having other symptoms that might be associated with depression or anxiety? No    Have you had a significant life event? No     Do you have any concerns with your use of alcohol or other drugs? No    Social History     Tobacco Use     Smoking status: Former Smoker     Packs/day: 0.00     Years: 0.00     Pack years: 0.00     Smokeless tobacco: Never Used     Tobacco comment: 2-3 cigs per day   Substance Use Topics     Alcohol use: Yes     Comment: occasionally-less once per week     Drug use: No     PHQ 5/30/2019 6/30/2020 5/19/2021   PHQ-9 Total Score 8 2 12   Q9: Thoughts of better off dead/self-harm past 2 weeks Not at all Not at all Not at all     MARVIN-7 SCORE 7/11/2018 6/30/2020 5/19/2021   Total Score - - -   Total Score 2 1 12       56}  Asthma Follow-Up    Was ACT completed today?    Yes    ACT Total Scores 5/19/2021   ACT TOTAL SCORE (Goal Greater than or Equal to 20) 22   In the past 12 months, how many times did you visit the emergency room for your asthma without being admitted to the hospital? 0   In the past 12 months, how many times were you hospitalized overnight because of your asthma? 0          How many days per week do you miss taking your asthma controller medication?  I do not have an asthma controller medication    Please describe any recent triggers for your asthma: not sure    Have you had any Emergency Room Visits, Urgent Care Visits, or Hospital Admissions since your last office visit?  No      Today's PHQ-2 Score:   PHQ-2 ( 1999 Pfizer) 5/19/2021 7/11/2018   Q1: Little interest or pleasure in doing things 0 0   Q2: Feeling down, depressed or hopeless 0 0   PHQ-2 Score 0 0       Abuse: Current or Past(Physical, Sexual or Emotional)- No  Do you feel safe in your environment? Yes    Have you ever done Advance Care Planning? (For example, a Health Directive, POLST, or a discussion with a medical provider or your loved ones about your wishes): No, advance care planning information given to patient to review.  Patient declined advance care planning discussion at this time.    Past Medical History:   Diagnosis Date     Abnormal Pap smear 12/2011     Anxiety      Headache disorder      Intermittent asthma without complication, unspecified asthma severity 06/30/2020     LGSIL on Pap smear of cervix     with obgyn 2018       Past Surgical History:   Procedure Laterality Date     ABDOMEN SURGERY  1995    had quarter removed     HC TOOTH EXTRACTION W/FORCEP  2011       Current Outpatient Medications   Medication Sig Dispense Refill     albuterol (PROAIR HFA/PROVENTIL HFA/VENTOLIN HFA) 108 (90 Base) MCG/ACT inhaler Inhale 2 puffs into the lungs every 6 hours 8.5 g 1     levonorgestrel (MIRENA) 20 MCG/24HR IUD 1 each by Intrauterine  route once.       sertraline (ZOLOFT) 50 MG tablet Take 1 tablet (50 mg) by mouth daily 30 tablet 0     Family History   Problem Relation Age of Onset     Family History Negative Mother      Family History Negative Father         bladder cancer     No Known Problems Brother      No Known Problems Daughter        Social History     Tobacco Use     Smoking status: Former Smoker     Packs/day: 0.00     Years: 0.00     Pack years: 0.00     Smokeless tobacco: Never Used     Tobacco comment: 2-3 cigs per day   Substance Use Topics     Alcohol use: Yes     Comment: occasionally-less once per week     If you drink alcohol do you typically have >3 drinks per day or >7 drinks per week? No                     Reviewed orders with patient.  Reviewed health maintenance and updated orders accordingly - Yes     Pertinent mammograms are reviewed under the imaging tab.    History of abnormal Pap smear: YES - updated in Problem List and Health Maintenance accordingly  PAP / HPV 4/23/2013   PAP NIL     Reviewed and updated as needed this visit by clinical staff  Tobacco  Allergies  Meds   Med Hx  Surg Hx  Fam Hx  Soc Hx        Reviewed and updated as needed this visit by Provider                    ROS:  CONSTITUTIONAL: NEGATIVE for fever, chills, change in weight  INTEGUMENTARU/SKIN: NEGATIVE for worrisome rashes, moles or lesions  EYES: NEGATIVE for vision changes or irritation  ENT: NEGATIVE for ear, mouth and throat problems  RESP: NEGATIVE for significant cough or SOB  BREAST: NEGATIVE for masses, tenderness or discharge  CV: NEGATIVE for chest pain, palpitations or peripheral edema  GI: NEGATIVE for nausea, abdominal pain, heartburn, or change in bowel habits  : NEGATIVE for unusual urinary or vaginal symptoms. Periods are regular.  MUSCULOSKELETAL: NEGATIVE for significant arthralgias or myalgia  NEURO: NEGATIVE for weakness, dizziness or paresthesias  PSYCHIATRIC: anxiety and depressed mood    OBJECTIVE:   /82    "Pulse 113   Temp 98.3  F (36.8  C) (Oral)   Ht 1.702 m (5' 7\")   Wt 89.9 kg (198 lb 4.8 oz)   SpO2 97%   BMI 31.06 kg/m    EXAM:  GENERAL: healthy, alert and no distress  EYES: Eyes grossly normal to inspection, PERRL and conjunctivae and sclerae normal  NECK: no adenopathy, no asymmetry, masses, or scars and thyroid normal to palpation  RESP: lungs clear to auscultation - no rales, rhonchi or wheezes  BREAST: normal without masses, tenderness or nipple discharge and no palpable axillary masses or adenopathy  CV: regular rate and rhythm, normal S1 S2, no S3 or S4, no murmur, click or rub, no peripheral edema and peripheral pulses strong  ABDOMEN: soft, nontender, no hepatosplenomegaly, no masses and bowel sounds normal   (female): normal female external genitalia, normal urethral meatus, vaginal mucosa pink, moist, well rugated, and normal cervix/adnexa/  without masses or discharge, IUD strings noted, pap obtained   MS: no gross musculoskeletal defects noted, no edema  SNEURO: Normal strength and tone, mentation intact and speech normal  PSYCH: mentation appears normal, affect normal/bright    Diagnostic Test Results:  Labs reviewed in Epic    ASSESSMENT/PLAN:     (Z00.00) Routine history and physical examination of adult  Plan: Hemoglobin, Comprehensive metabolic panel, TSH         with free T4 reflex, Lipid panel reflex to         direct LDL Non-fasting,              (F33.1) Major depressive disorder, recurrent episode, moderate (H)   Plan: Patient was started on sertraline (ZOLOFT) 50 MG tablet as directed.explained clearly about the medication,insructions and side effects.  Advised video visit in 3 to 4 weeks        (J45.20) Intermittent asthma without complication, unspecified asthma severity  Comment: Patient states she was diagnosed with asthma 1 year ago, has not used inhaler for more than 6 months and does not have an inhaler   Plan: Prescribed albuterol (PROAIR HFA/PROVENTIL HFA/VENTOLIN HFA) 108 " "(90 Base) MCG/ACT inhaler as directed.explained clearly about the medication,insructions and side effects.     (R87.612) LGSIL on Pap smear of cervix  Plan:diagnostic pap ordered         Patient has been advised of split billing requirements and indicates understanding: Yes  COUNSELING:   Reviewed preventive health counseling, as reflected in patient instructions       Regular exercise       Healthy diet/nutrition    Estimated body mass index is 31.06 kg/m  as calculated from the following:    Height as of this encounter: 1.702 m (5' 7\").    Weight as of this encounter: 89.9 kg (198 lb 4.8 oz).    Weight management plan: Discussed healthy diet and exercise guidelines    She reports that she has quit smoking. She smoked 0.00 packs per day for 0.00 years. She has never used smokeless tobacco.      Counseling Resources:  ATP IV Guidelines  Pooled Cohorts Equation Calculator  Breast Cancer Risk Calculator  BRCA-Related Cancer Risk Assessment: FHS-7 Tool  FRAX Risk Assessment  ICSI Preventive Guidelines  Dietary Guidelines for Americans, 2010  USDA's MyPlate  ASA Prophylaxis  Lung CA Screening    Alka Norman MD  [facility]  "

## 2021-05-19 NOTE — NURSING NOTE
"/82   Pulse 113   Temp 98.3  F (36.8  C) (Oral)   Ht 1.702 m (5' 7\")   Wt 89.9 kg (198 lb 4.8 oz)   SpO2 97%   BMI 31.06 kg/m    Patient in for follow up on lipid, depression, anxiety and asthms.  Neva Helm, TRACY    "

## 2021-05-20 LAB
ALBUMIN SERPL-MCNC: 4.1 G/DL (ref 3.4–5)
ALP SERPL-CCNC: 74 U/L (ref 40–150)
ALT SERPL W P-5'-P-CCNC: 36 U/L (ref 0–50)
ANION GAP SERPL CALCULATED.3IONS-SCNC: 7 MMOL/L (ref 3–14)
AST SERPL W P-5'-P-CCNC: 20 U/L (ref 0–45)
BILIRUB SERPL-MCNC: 0.6 MG/DL (ref 0.2–1.3)
BUN SERPL-MCNC: 9 MG/DL (ref 7–30)
CALCIUM SERPL-MCNC: 9 MG/DL (ref 8.5–10.1)
CHLORIDE SERPL-SCNC: 105 MMOL/L (ref 94–109)
CO2 SERPL-SCNC: 25 MMOL/L (ref 20–32)
CREAT SERPL-MCNC: 0.76 MG/DL (ref 0.52–1.04)
GFR SERPL CREATININE-BSD FRML MDRD: >90 ML/MIN/{1.73_M2}
GLUCOSE SERPL-MCNC: 70 MG/DL (ref 70–99)
POTASSIUM SERPL-SCNC: 3.8 MMOL/L (ref 3.4–5.3)
PROT SERPL-MCNC: 7.5 G/DL (ref 6.8–8.8)
SODIUM SERPL-SCNC: 137 MMOL/L (ref 133–144)
TSH SERPL DL<=0.005 MIU/L-ACNC: 2.35 MU/L (ref 0.4–4)

## 2021-05-20 ASSESSMENT — ASTHMA QUESTIONNAIRES: ACT_TOTALSCORE: 22

## 2021-05-20 ASSESSMENT — ANXIETY QUESTIONNAIRES: GAD7 TOTAL SCORE: 12

## 2021-05-24 LAB
COPATH REPORT: NORMAL
PAP: NORMAL

## 2021-05-25 LAB
FINAL DIAGNOSIS: NORMAL
HPV HR 12 DNA CVX QL NAA+PROBE: NEGATIVE
HPV16 DNA SPEC QL NAA+PROBE: NEGATIVE
HPV18 DNA SPEC QL NAA+PROBE: NEGATIVE
SPECIMEN DESCRIPTION: NORMAL
SPECIMEN SOURCE CVX/VAG CYTO: NORMAL

## 2021-05-28 ENCOUNTER — PATIENT OUTREACH (OUTPATIENT)
Dept: INTERNAL MEDICINE | Facility: CLINIC | Age: 31
End: 2021-05-28

## 2021-06-07 ENCOUNTER — VIRTUAL VISIT (OUTPATIENT)
Dept: INTERNAL MEDICINE | Facility: CLINIC | Age: 31
End: 2021-06-07
Payer: COMMERCIAL

## 2021-06-07 DIAGNOSIS — F41.9 ANXIETY: Primary | ICD-10-CM

## 2021-06-07 DIAGNOSIS — F33.1 MAJOR DEPRESSIVE DISORDER, RECURRENT EPISODE, MODERATE (H): ICD-10-CM

## 2021-06-07 PROCEDURE — 99213 OFFICE O/P EST LOW 20 MIN: CPT | Mod: GT | Performed by: INTERNAL MEDICINE

## 2021-06-07 ASSESSMENT — ANXIETY QUESTIONNAIRES
GAD7 TOTAL SCORE: 0
7. FEELING AFRAID AS IF SOMETHING AWFUL MIGHT HAPPEN: NOT AT ALL
5. BEING SO RESTLESS THAT IT IS HARD TO SIT STILL: NOT AT ALL
IF YOU CHECKED OFF ANY PROBLEMS ON THIS QUESTIONNAIRE, HOW DIFFICULT HAVE THESE PROBLEMS MADE IT FOR YOU TO DO YOUR WORK, TAKE CARE OF THINGS AT HOME, OR GET ALONG WITH OTHER PEOPLE: NOT DIFFICULT AT ALL
2. NOT BEING ABLE TO STOP OR CONTROL WORRYING: NOT AT ALL
1. FEELING NERVOUS, ANXIOUS, OR ON EDGE: NOT AT ALL
6. BECOMING EASILY ANNOYED OR IRRITABLE: NOT AT ALL
3. WORRYING TOO MUCH ABOUT DIFFERENT THINGS: NOT AT ALL

## 2021-06-07 ASSESSMENT — PATIENT HEALTH QUESTIONNAIRE - PHQ9
SUM OF ALL RESPONSES TO PHQ QUESTIONS 1-9: 0
5. POOR APPETITE OR OVEREATING: NOT AT ALL

## 2021-06-07 NOTE — PROGRESS NOTES
Tessa is a 30 year old who is being evaluated via a billable video visit.      How would you like to obtain your AVS? MyChart  If the video visit is dropped, the invitation should be resent by: Text to cell phone: [phone]  Will anyone else be joining your video visit? No      Video Start Time: 11:05 AM     Assessment & Plan        (F33.1) Major depressive disorder, recurrent episode, moderate (H)   (F41.9) Anxiety    Plan: well controlled, refilled sertraline (ZOLOFT) 50 MG tablet daily as directed.explained clearly about the medication,insructions and side effects.  F/u in 3 months     Prescription drug management        Return in about 3 months for depression/anxiety follow up     Alka Norman MD  [facility]   Tessa is a 30 year old who presents for the following health issues     HPI      Depression Followup    How are you doing with your depression since your last visit? No change, was started on Zoloft at last OV , tolerating well with good symptom relief.    Are you having other symptoms that might be associated with depression? No    Have you had a significant life event?  No     Are you feeling anxious or having panic attacks?   No    Do you have any concerns with your use of alcohol or other drugs? No      PHQ 6/30/2020 5/19/2021 6/7/2021   PHQ-9 Total Score 2 12 0   Q9: Thoughts of better off dead/self-harm past 2 weeks Not at all Not at all Not at all     MARVIN-7 SCORE 6/30/2020 5/19/2021 6/7/2021   Total Score - - -   Total Score 1 12 0          How many servings of fruits and vegetables do you eat daily?  4 or more    On average, how many sweetened beverages do you drink each day (Examples: soda, juice, sweet tea, etc.  Do NOT count diet or artificially sweetened beverages)?   2    How many days per week do you exercise enough to make your heart beat faster? 4    How many minutes a day do you exercise enough to make your heart beat faster? 30 -  60    How many days per week do you miss taking your medication? 0    Past Medical History:   Diagnosis Date     Abnormal Pap smear 12/2011     Anxiety      Cervical high risk HPV (human papillomavirus) test positive 01/09/2012 5/18/12, 7/23/15, 10/26/17     Headache disorder      History of colposcopy 08/07/2015 11/6/17     Intermittent asthma without complication, unspecified asthma severity 06/30/2020       Current Outpatient Medications   Medication Sig Dispense Refill     albuterol (PROAIR HFA/PROVENTIL HFA/VENTOLIN HFA) 108 (90 Base) MCG/ACT inhaler Inhale 2 puffs into the lungs every 6 hours 8.5 g 1     levonorgestrel (MIRENA) 20 MCG/24HR IUD 1 each by Intrauterine route once.       sertraline (ZOLOFT) 50 MG tablet Take 1 tablet (50 mg) by mouth daily 90 tablet 1         Review of Systems   CONSTITUTIONAL: NEGATIVE for fever, chills, change in weight  RESP: NEGATIVE for significant cough or SOB  CV: NEGATIVE for chest pain, palpitations or peripheral edema  PSYCHIATRIC: NEGATIVE for changes in mood or affect      Objective           Vitals:  No vitals were obtained today due to virtual visit.    Physical Exam   GENERAL: Healthy, alert and no distress  EYES: Eyes grossly normal to inspection.  No discharge or erythema, or obvious scleral/conjunctival abnormalities.  RESP: No audible wheeze, cough, or visible cyanosis.  No visible retractions or increased work of breathing.    PSYCH: Mentation appears normal, affect normal/bright, judgement and insight intact, normal speech and appearance well-groomed.             Video-Visit Details    Type of service:  Video Visit    Video End Time:11:12 AM    Originating Location (pt. Location): Home    Distant Location (provider location):  [facility]     Platform used for Video Visit: Jaclyn

## 2021-06-08 ENCOUNTER — PATIENT OUTREACH (OUTPATIENT)
Dept: INTERNAL MEDICINE | Facility: CLINIC | Age: 31
End: 2021-06-08

## 2021-06-08 ASSESSMENT — ANXIETY QUESTIONNAIRES: GAD7 TOTAL SCORE: 0

## 2021-06-08 ASSESSMENT — ASTHMA QUESTIONNAIRES: ACT_TOTALSCORE: 25

## 2021-09-05 ENCOUNTER — HEALTH MAINTENANCE LETTER (OUTPATIENT)
Age: 31
End: 2021-09-05

## 2021-10-14 ENCOUNTER — LAB REQUISITION (OUTPATIENT)
Dept: LAB | Facility: CLINIC | Age: 31
End: 2021-10-14
Payer: COMMERCIAL

## 2021-10-14 PROCEDURE — 88305 TISSUE EXAM BY PATHOLOGIST: CPT | Performed by: PATHOLOGY

## 2021-10-14 PROCEDURE — 88305 TISSUE EXAM BY PATHOLOGIST: CPT | Mod: TC,ORL | Performed by: OBSTETRICS & GYNECOLOGY

## 2021-10-15 LAB
PATH REPORT.COMMENTS IMP SPEC: NORMAL
PATH REPORT.COMMENTS IMP SPEC: NORMAL
PATH REPORT.FINAL DX SPEC: NORMAL
PATH REPORT.GROSS SPEC: NORMAL
PATH REPORT.MICROSCOPIC SPEC OTHER STN: NORMAL
PATH REPORT.RELEVANT HX SPEC: NORMAL
PHOTO IMAGE: NORMAL

## 2021-10-15 PROCEDURE — 88305 TISSUE EXAM BY PATHOLOGIST: CPT | Mod: 26 | Performed by: PATHOLOGY

## 2021-11-17 ENCOUNTER — HOSPITAL ENCOUNTER (INPATIENT)
Facility: CLINIC | Age: 31
LOS: 5 days | Discharge: HOME | DRG: 885 | End: 2021-11-22
Attending: EMERGENCY MEDICINE | Admitting: PSYCHIATRY & NEUROLOGY
Payer: COMMERCIAL

## 2021-11-17 DIAGNOSIS — Z72.89 SELF-INJURIOUS BEHAVIOR: ICD-10-CM

## 2021-11-17 DIAGNOSIS — Z20.822 LAB TEST NEGATIVE FOR COVID-19 VIRUS: ICD-10-CM

## 2021-11-17 DIAGNOSIS — F10.220 ALCOHOL DEPENDENCE WITH UNCOMPLICATED INTOXICATION (H): ICD-10-CM

## 2021-11-17 DIAGNOSIS — F31.81 BIPOLAR 2 DISORDER (H): Primary | ICD-10-CM

## 2021-11-17 DIAGNOSIS — F33.1 MODERATE EPISODE OF RECURRENT MAJOR DEPRESSIVE DISORDER (H): ICD-10-CM

## 2021-11-17 DIAGNOSIS — S71.112A LACERATION OF LEFT THIGH, INITIAL ENCOUNTER: ICD-10-CM

## 2021-11-17 DIAGNOSIS — F41.1 GAD (GENERALIZED ANXIETY DISORDER): ICD-10-CM

## 2021-11-17 DIAGNOSIS — T14.8XXA SUPERFICIAL LACERATION: ICD-10-CM

## 2021-11-17 DIAGNOSIS — S41.112A LACERATION OF LEFT UPPER ARM, INITIAL ENCOUNTER: ICD-10-CM

## 2021-11-17 DIAGNOSIS — F33.1 MAJOR DEPRESSIVE DISORDER, RECURRENT EPISODE, MODERATE (H): ICD-10-CM

## 2021-11-17 DIAGNOSIS — R45.851 SUICIDAL IDEATION: ICD-10-CM

## 2021-11-17 LAB
ALBUMIN SERPL-MCNC: 4.3 G/DL (ref 3.4–5)
ALBUMIN UR-MCNC: 50 MG/DL
ALP SERPL-CCNC: 94 U/L (ref 40–150)
ALT SERPL W P-5'-P-CCNC: 42 U/L (ref 0–50)
AMPHETAMINES UR QL SCN: NORMAL
ANION GAP SERPL CALCULATED.3IONS-SCNC: 7 MMOL/L (ref 3–14)
APPEARANCE UR: ABNORMAL
AST SERPL W P-5'-P-CCNC: 29 U/L (ref 0–45)
BACTERIA #/AREA URNS HPF: ABNORMAL /HPF
BARBITURATES UR QL: NORMAL
BASOPHILS # BLD AUTO: 0.1 10E3/UL (ref 0–0.2)
BASOPHILS NFR BLD AUTO: 0 %
BENZODIAZ UR QL: NORMAL
BILIRUB SERPL-MCNC: 0.9 MG/DL (ref 0.2–1.3)
BILIRUB UR QL STRIP: NEGATIVE
BUN SERPL-MCNC: 13 MG/DL (ref 7–30)
CALCIUM SERPL-MCNC: 9.2 MG/DL (ref 8.5–10.1)
CANNABINOIDS UR QL SCN: NORMAL
CHLORIDE BLD-SCNC: 106 MMOL/L (ref 94–109)
CO2 SERPL-SCNC: 24 MMOL/L (ref 20–32)
COCAINE UR QL: NORMAL
COLOR UR AUTO: ABNORMAL
CREAT SERPL-MCNC: 0.64 MG/DL (ref 0.52–1.04)
EOSINOPHIL # BLD AUTO: 0.1 10E3/UL (ref 0–0.7)
EOSINOPHIL NFR BLD AUTO: 1 %
ERYTHROCYTE [DISTWIDTH] IN BLOOD BY AUTOMATED COUNT: 12.9 % (ref 10–15)
ETHANOL SERPL-MCNC: <0.01 G/DL
GFR SERPL CREATININE-BSD FRML MDRD: >90 ML/MIN/1.73M2
GLUCOSE BLD-MCNC: 90 MG/DL (ref 70–99)
GLUCOSE UR STRIP-MCNC: NEGATIVE MG/DL
HCG UR QL: NEGATIVE
HCT VFR BLD AUTO: 45.1 % (ref 35–47)
HGB BLD-MCNC: 15.8 G/DL (ref 11.7–15.7)
HGB UR QL STRIP: NEGATIVE
IMM GRANULOCYTES # BLD: 0.1 10E3/UL
IMM GRANULOCYTES NFR BLD: 1 %
KETONES UR STRIP-MCNC: NEGATIVE MG/DL
LEUKOCYTE ESTERASE UR QL STRIP: ABNORMAL
LYMPHOCYTES # BLD AUTO: 1.3 10E3/UL (ref 0.8–5.3)
LYMPHOCYTES NFR BLD AUTO: 10 %
MCH RBC QN AUTO: 32 PG (ref 26.5–33)
MCHC RBC AUTO-ENTMCNC: 35 G/DL (ref 31.5–36.5)
MCV RBC AUTO: 92 FL (ref 78–100)
MONOCYTES # BLD AUTO: 0.7 10E3/UL (ref 0–1.3)
MONOCYTES NFR BLD AUTO: 5 %
MUCOUS THREADS #/AREA URNS LPF: PRESENT /LPF
NEUTROPHILS # BLD AUTO: 11.1 10E3/UL (ref 1.6–8.3)
NEUTROPHILS NFR BLD AUTO: 83 %
NITRATE UR QL: NEGATIVE
NRBC # BLD AUTO: 0 10E3/UL
NRBC BLD AUTO-RTO: 0 /100
OPIATES UR QL SCN: NORMAL
PH UR STRIP: 6 [PH] (ref 5–7)
PLATELET # BLD AUTO: 281 10E3/UL (ref 150–450)
POTASSIUM BLD-SCNC: 3.9 MMOL/L (ref 3.4–5.3)
PROT SERPL-MCNC: 8.1 G/DL (ref 6.8–8.8)
RBC # BLD AUTO: 4.93 10E6/UL (ref 3.8–5.2)
RBC URINE: 0 /HPF
SARS-COV-2 RNA RESP QL NAA+PROBE: NEGATIVE
SODIUM SERPL-SCNC: 137 MMOL/L (ref 133–144)
SP GR UR STRIP: 1.02 (ref 1–1.03)
SQUAMOUS EPITHELIAL: 31 /HPF
UROBILINOGEN UR STRIP-MCNC: NORMAL MG/DL
WBC # BLD AUTO: 13.2 10E3/UL (ref 4–11)
WBC URINE: 38 /HPF

## 2021-11-17 PROCEDURE — 99284 EMERGENCY DEPT VISIT MOD MDM: CPT | Performed by: EMERGENCY MEDICINE

## 2021-11-17 PROCEDURE — 82077 ASSAY SPEC XCP UR&BREATH IA: CPT | Performed by: EMERGENCY MEDICINE

## 2021-11-17 PROCEDURE — C9803 HOPD COVID-19 SPEC COLLECT: HCPCS | Performed by: EMERGENCY MEDICINE

## 2021-11-17 PROCEDURE — 82040 ASSAY OF SERUM ALBUMIN: CPT | Performed by: EMERGENCY MEDICINE

## 2021-11-17 PROCEDURE — 87086 URINE CULTURE/COLONY COUNT: CPT | Performed by: EMERGENCY MEDICINE

## 2021-11-17 PROCEDURE — 85025 COMPLETE CBC W/AUTO DIFF WBC: CPT | Performed by: EMERGENCY MEDICINE

## 2021-11-17 PROCEDURE — 81001 URINALYSIS AUTO W/SCOPE: CPT | Performed by: EMERGENCY MEDICINE

## 2021-11-17 PROCEDURE — 36415 COLL VENOUS BLD VENIPUNCTURE: CPT | Performed by: EMERGENCY MEDICINE

## 2021-11-17 PROCEDURE — 81025 URINE PREGNANCY TEST: CPT | Performed by: EMERGENCY MEDICINE

## 2021-11-17 PROCEDURE — 128N000001 HC R&B CD/MH ADULT

## 2021-11-17 PROCEDURE — 250N000013 HC RX MED GY IP 250 OP 250 PS 637: Performed by: NURSE PRACTITIONER

## 2021-11-17 PROCEDURE — 250N000013 HC RX MED GY IP 250 OP 250 PS 637: Performed by: EMERGENCY MEDICINE

## 2021-11-17 PROCEDURE — 80307 DRUG TEST PRSMV CHEM ANLYZR: CPT | Performed by: FAMILY MEDICINE

## 2021-11-17 PROCEDURE — 99285 EMERGENCY DEPT VISIT HI MDM: CPT | Mod: 25 | Performed by: EMERGENCY MEDICINE

## 2021-11-17 PROCEDURE — 128N000004 HC R&B CD ADULT

## 2021-11-17 PROCEDURE — U0003 INFECTIOUS AGENT DETECTION BY NUCLEIC ACID (DNA OR RNA); SEVERE ACUTE RESPIRATORY SYNDROME CORONAVIRUS 2 (SARS-COV-2) (CORONAVIRUS DISEASE [COVID-19]), AMPLIFIED PROBE TECHNIQUE, MAKING USE OF HIGH THROUGHPUT TECHNOLOGIES AS DESCRIBED BY CMS-2020-01-R: HCPCS | Performed by: EMERGENCY MEDICINE

## 2021-11-17 RX ORDER — MULTIVITAMIN,THERAPEUTIC
1 TABLET ORAL DAILY
Status: DISCONTINUED | OUTPATIENT
Start: 2021-11-17 | End: 2021-11-22 | Stop reason: HOSPADM

## 2021-11-17 RX ORDER — MAGNESIUM HYDROXIDE/ALUMINUM HYDROXICE/SIMETHICONE 120; 1200; 1200 MG/30ML; MG/30ML; MG/30ML
30 SUSPENSION ORAL EVERY 4 HOURS PRN
Status: DISCONTINUED | OUTPATIENT
Start: 2021-11-17 | End: 2021-11-22 | Stop reason: HOSPADM

## 2021-11-17 RX ORDER — ONDANSETRON 4 MG/1
4 TABLET, ORALLY DISINTEGRATING ORAL EVERY 6 HOURS PRN
Status: DISCONTINUED | OUTPATIENT
Start: 2021-11-17 | End: 2021-11-22 | Stop reason: HOSPADM

## 2021-11-17 RX ORDER — TRAZODONE HYDROCHLORIDE 50 MG/1
50 TABLET, FILM COATED ORAL
Status: DISCONTINUED | OUTPATIENT
Start: 2021-11-17 | End: 2021-11-22 | Stop reason: HOSPADM

## 2021-11-17 RX ORDER — ALBUTEROL SULFATE 90 UG/1
2 AEROSOL, METERED RESPIRATORY (INHALATION) EVERY 6 HOURS PRN
Status: DISCONTINUED | OUTPATIENT
Start: 2021-11-17 | End: 2021-11-22 | Stop reason: HOSPADM

## 2021-11-17 RX ORDER — IBUPROFEN 600 MG/1
600 TABLET, FILM COATED ORAL EVERY 6 HOURS PRN
Status: DISCONTINUED | OUTPATIENT
Start: 2021-11-17 | End: 2021-11-22 | Stop reason: HOSPADM

## 2021-11-17 RX ORDER — LOPERAMIDE HCL 2 MG
2 CAPSULE ORAL 4 TIMES DAILY PRN
Status: DISCONTINUED | OUTPATIENT
Start: 2021-11-17 | End: 2021-11-22 | Stop reason: HOSPADM

## 2021-11-17 RX ORDER — HYDROXYZINE HYDROCHLORIDE 25 MG/1
25 TABLET, FILM COATED ORAL EVERY 4 HOURS PRN
Status: DISCONTINUED | OUTPATIENT
Start: 2021-11-17 | End: 2021-11-22 | Stop reason: HOSPADM

## 2021-11-17 RX ORDER — ACETAMINOPHEN 325 MG/1
650 TABLET ORAL EVERY 4 HOURS PRN
Status: DISCONTINUED | OUTPATIENT
Start: 2021-11-17 | End: 2021-11-22 | Stop reason: HOSPADM

## 2021-11-17 RX ORDER — AMOXICILLIN 250 MG
1 CAPSULE ORAL 2 TIMES DAILY PRN
Status: DISCONTINUED | OUTPATIENT
Start: 2021-11-17 | End: 2021-11-22 | Stop reason: HOSPADM

## 2021-11-17 RX ORDER — FOLIC ACID 1 MG/1
1 TABLET ORAL DAILY
Status: DISCONTINUED | OUTPATIENT
Start: 2021-11-17 | End: 2021-11-22 | Stop reason: HOSPADM

## 2021-11-17 RX ORDER — DIAZEPAM 5 MG
5-20 TABLET ORAL EVERY 30 MIN PRN
Status: DISCONTINUED | OUTPATIENT
Start: 2021-11-17 | End: 2021-11-22 | Stop reason: HOSPADM

## 2021-11-17 RX ADMIN — HYDROXYZINE HYDROCHLORIDE 25 MG: 25 TABLET, FILM COATED ORAL at 22:16

## 2021-11-17 RX ADMIN — THIAMINE HCL TAB 100 MG 100 MG: 100 TAB at 22:16

## 2021-11-17 RX ADMIN — TRAZODONE HYDROCHLORIDE 50 MG: 50 TABLET ORAL at 22:16

## 2021-11-17 RX ADMIN — MULTIPLE VITAMINS W/ MINERALS TAB 1 TABLET: TAB at 22:16

## 2021-11-17 RX ADMIN — SERTRALINE HYDROCHLORIDE 50 MG: 50 TABLET ORAL at 22:16

## 2021-11-17 RX ADMIN — DIAZEPAM 5 MG: 5 TABLET ORAL at 14:24

## 2021-11-17 RX ADMIN — DIAZEPAM 5 MG: 5 TABLET ORAL at 19:00

## 2021-11-17 RX ADMIN — DIAZEPAM 10 MG: 5 TABLET ORAL at 20:45

## 2021-11-17 RX ADMIN — FOLIC ACID 1 MG: 1 TABLET ORAL at 22:16

## 2021-11-17 RX ADMIN — DIAZEPAM 5 MG: 5 TABLET ORAL at 16:21

## 2021-11-17 ASSESSMENT — ENCOUNTER SYMPTOMS
DYSPHORIC MOOD: 1
HALLUCINATIONS: 0
NERVOUS/ANXIOUS: 1

## 2021-11-17 NOTE — PHARMACY-ADMISSION MEDICATION HISTORY
Admission Medication History Completed by Pharmacy    See [facility] Admission Navigator for allergy information, preferred outpatient pharmacy, prior to admission medications and immunization status.     Medication History Sources:     Tessa    Dispense report    [facility] records    Changes made to PTA medication list (reason):    Added: None    Deleted: None    Changed: Albuterol -> PRN, sertraline -> with lunch    Additional Information:    Tessa verified her home medications.    Prior to Admission medications    Medication Sig Last Dose Taking? Auth Provider   albuterol (PROAIR HFA/PROVENTIL HFA/VENTOLIN HFA) 108 (90 Base) MCG/ACT inhaler Inhale 2 puffs into the lungs every 6 hours as needed for shortness of breath / dyspnea or wheezing  Past Week at Unknown time Yes Alka Norman MD   sertraline (ZOLOFT) 50 MG tablet Take 50 mg by mouth daily (with lunch)  11/16/2021 at Unknown time Yes Alka Norman MD       The information provided in this note is only as accurate as the sources available at the time of the update(s).    Date completed: 11/17/21    Medication history completed by: Liset Trujillo [facility]

## 2021-11-17 NOTE — ED TRIAGE NOTES
Patient presents today seeking detox from alcohol and help with suicidal thoughts. Patient usually drinks 1-2 liters per week of vodka. Patient reports tremors and nausea detox. Patient denies history of seizures. Patient cut herself last night several times to upper left arm and leg.

## 2021-11-18 LAB
BACTERIA UR CULT: NORMAL
BASOPHILS # BLD AUTO: 0 10E3/UL (ref 0–0.2)
BASOPHILS NFR BLD AUTO: 0 %
CHOLEST SERPL-MCNC: 213 MG/DL
EOSINOPHIL # BLD AUTO: 0.2 10E3/UL (ref 0–0.7)
EOSINOPHIL NFR BLD AUTO: 2 %
ERYTHROCYTE [DISTWIDTH] IN BLOOD BY AUTOMATED COUNT: 13 % (ref 10–15)
FOLATE SERPL-MCNC: 19.8 NG/ML
GGT SERPL-CCNC: 35 U/L (ref 0–40)
HCT VFR BLD AUTO: 43.1 % (ref 35–47)
HDLC SERPL-MCNC: 51 MG/DL
HGB BLD-MCNC: 14.7 G/DL (ref 11.7–15.7)
IMM GRANULOCYTES # BLD: 0 10E3/UL
IMM GRANULOCYTES NFR BLD: 0 %
LDLC SERPL CALC-MCNC: 121 MG/DL
LYMPHOCYTES # BLD AUTO: 1.8 10E3/UL (ref 0.8–5.3)
LYMPHOCYTES NFR BLD AUTO: 22 %
MAGNESIUM SERPL-MCNC: 2.2 MG/DL (ref 1.6–2.3)
MCH RBC QN AUTO: 31.7 PG (ref 26.5–33)
MCHC RBC AUTO-ENTMCNC: 34.1 G/DL (ref 31.5–36.5)
MCV RBC AUTO: 93 FL (ref 78–100)
MONOCYTES # BLD AUTO: 0.7 10E3/UL (ref 0–1.3)
MONOCYTES NFR BLD AUTO: 8 %
NEUTROPHILS # BLD AUTO: 5.5 10E3/UL (ref 1.6–8.3)
NEUTROPHILS NFR BLD AUTO: 68 %
NONHDLC SERPL-MCNC: 162 MG/DL
NRBC # BLD AUTO: 0 10E3/UL
NRBC BLD AUTO-RTO: 0 /100
PLATELET # BLD AUTO: 241 10E3/UL (ref 150–450)
RBC # BLD AUTO: 4.63 10E6/UL (ref 3.8–5.2)
SARS-COV-2 RNA RESP QL NAA+PROBE: NEGATIVE
TRIGL SERPL-MCNC: 204 MG/DL
TSH SERPL DL<=0.005 MIU/L-ACNC: 2.05 MU/L (ref 0.4–4)
VIT B12 SERPL-MCNC: 559 PG/ML (ref 193–986)
WBC # BLD AUTO: 8.1 10E3/UL (ref 4–11)

## 2021-11-18 PROCEDURE — 99223 1ST HOSP IP/OBS HIGH 75: CPT | Mod: AI | Performed by: PSYCHIATRY & NEUROLOGY

## 2021-11-18 PROCEDURE — 84443 ASSAY THYROID STIM HORMONE: CPT | Performed by: NURSE PRACTITIONER

## 2021-11-18 PROCEDURE — 250N000013 HC RX MED GY IP 250 OP 250 PS 637: Performed by: EMERGENCY MEDICINE

## 2021-11-18 PROCEDURE — 80061 LIPID PANEL: CPT | Performed by: NURSE PRACTITIONER

## 2021-11-18 PROCEDURE — 99221 1ST HOSP IP/OBS SF/LOW 40: CPT | Performed by: PHYSICIAN ASSISTANT

## 2021-11-18 PROCEDURE — 36415 COLL VENOUS BLD VENIPUNCTURE: CPT | Performed by: NURSE PRACTITIONER

## 2021-11-18 PROCEDURE — 250N000013 HC RX MED GY IP 250 OP 250 PS 637: Performed by: PSYCHIATRY & NEUROLOGY

## 2021-11-18 PROCEDURE — 250N000013 HC RX MED GY IP 250 OP 250 PS 637: Performed by: NURSE PRACTITIONER

## 2021-11-18 PROCEDURE — 82977 ASSAY OF GGT: CPT | Performed by: NURSE PRACTITIONER

## 2021-11-18 PROCEDURE — 83735 ASSAY OF MAGNESIUM: CPT | Performed by: PHYSICIAN ASSISTANT

## 2021-11-18 PROCEDURE — 99207 PR CONSULT E&M CHANGED TO INITIAL LEVEL: CPT | Performed by: PHYSICIAN ASSISTANT

## 2021-11-18 PROCEDURE — H2035 A/D TX PROGRAM, PER HOUR: HCPCS | Mod: HQ

## 2021-11-18 PROCEDURE — U0003 INFECTIOUS AGENT DETECTION BY NUCLEIC ACID (DNA OR RNA); SEVERE ACUTE RESPIRATORY SYNDROME CORONAVIRUS 2 (SARS-COV-2) (CORONAVIRUS DISEASE [COVID-19]), AMPLIFIED PROBE TECHNIQUE, MAKING USE OF HIGH THROUGHPUT TECHNOLOGIES AS DESCRIBED BY CMS-2020-01-R: HCPCS | Performed by: PSYCHIATRY & NEUROLOGY

## 2021-11-18 PROCEDURE — 128N000001 HC R&B CD/MH ADULT

## 2021-11-18 PROCEDURE — HZ2ZZZZ DETOXIFICATION SERVICES FOR SUBSTANCE ABUSE TREATMENT: ICD-10-PCS | Performed by: PSYCHIATRY & NEUROLOGY

## 2021-11-18 PROCEDURE — 82746 ASSAY OF FOLIC ACID SERUM: CPT | Performed by: NURSE PRACTITIONER

## 2021-11-18 PROCEDURE — 85049 AUTOMATED PLATELET COUNT: CPT | Performed by: NURSE PRACTITIONER

## 2021-11-18 PROCEDURE — 82607 VITAMIN B-12: CPT | Performed by: NURSE PRACTITIONER

## 2021-11-18 RX ORDER — DIVALPROEX SODIUM 250 MG/1
250 TABLET, EXTENDED RELEASE ORAL 2 TIMES DAILY
Status: DISCONTINUED | OUTPATIENT
Start: 2021-11-18 | End: 2021-11-22 | Stop reason: HOSPADM

## 2021-11-18 RX ADMIN — FOLIC ACID 1 MG: 1 TABLET ORAL at 08:15

## 2021-11-18 RX ADMIN — IBUPROFEN 600 MG: 600 TABLET ORAL at 12:23

## 2021-11-18 RX ADMIN — HYDROXYZINE HYDROCHLORIDE 25 MG: 25 TABLET, FILM COATED ORAL at 16:20

## 2021-11-18 RX ADMIN — DIAZEPAM 10 MG: 5 TABLET ORAL at 04:52

## 2021-11-18 RX ADMIN — DIAZEPAM 10 MG: 5 TABLET ORAL at 08:15

## 2021-11-18 RX ADMIN — HYDROXYZINE HYDROCHLORIDE 25 MG: 25 TABLET, FILM COATED ORAL at 21:00

## 2021-11-18 RX ADMIN — DIVALPROEX SODIUM 250 MG: 250 TABLET, FILM COATED, EXTENDED RELEASE ORAL at 21:00

## 2021-11-18 RX ADMIN — DIVALPROEX SODIUM 250 MG: 250 TABLET, FILM COATED, EXTENDED RELEASE ORAL at 10:30

## 2021-11-18 RX ADMIN — DIAZEPAM 10 MG: 5 TABLET ORAL at 00:33

## 2021-11-18 RX ADMIN — THIAMINE HCL TAB 100 MG 100 MG: 100 TAB at 08:15

## 2021-11-18 RX ADMIN — TRAZODONE HYDROCHLORIDE 50 MG: 50 TABLET ORAL at 21:52

## 2021-11-18 RX ADMIN — DIAZEPAM 10 MG: 5 TABLET ORAL at 12:22

## 2021-11-18 RX ADMIN — MULTIPLE VITAMINS W/ MINERALS TAB 1 TABLET: TAB at 08:15

## 2021-11-18 RX ADMIN — SERTRALINE HYDROCHLORIDE 50 MG: 50 TABLET ORAL at 08:15

## 2021-11-18 NOTE — PLAN OF CARE
[facility] Admission Note    S = Situation:   eTssa Barajas is a 31 year old year old female with a chief complaint of Suicidal (Patient presents today due to suicidal thoughts and increased drinking. Patient is seeking detox and assistance with mental health. Patient reports recent cutting last night to left arm and left leg.)    Voluntary admission to [facility] for alcohol withdrawal and detox.    B  = Background:   Substance use history: Pt reports drinking 1/4 - 1/3 liter of vodka daily for the past 1-2 years.    Mental health history: Dx of depression and anxiety. Takes Zoloft 50 mg, prescribed by family medicine provider. Denies hx of  treatment, including outpatient providers and hospitalizations.    Medical history: Asthma    Legal history: Denies    Treatment history: Denies    Living situation: Alone, with 9 year old daughter part-time       A  =  Assessment:   During admission interview, pt affect was tearful.    MSSA 12    Patient Vitals for the past 24 hrs:   BP Temp Temp src Pulse Resp SpO2   11/17/21 1947 (!) 137/92 97.8  F (36.6  C) Temporal (!) 123 16 97 %   11/17/21 1857 133/88 99  F (37.2  C) Oral 114 16 99 %   11/17/21 1711 131/85 99.1  F (37.3  C) Oral 102 16 100 %   11/17/21 1554 (!) 144/94 -- -- 110 -- 100 %   11/17/21 1417 (!) 152/108 98  F (36.7  C) Oral 118 16 98 %   11/17/21 1017 (!) 127/93 98.1  F (36.7  C) Oral 116 16 98 %       R =   Request or Recommendation:   Alcohol withdrawal will be monitored and treated using MSSA with valium  Pt will meet with psychiatry, internal medicine, and case management tomorrow.  At the time of admission, pt reports discharge plan is residential treatment

## 2021-11-18 NOTE — PLAN OF CARE
Tessa has been visible in the milieu today.    She ate 50%-75% of her breakfast and lunch.    She rated anxiety 6/10 and depression 4/10. She denied SI/SIB/HI.    She complained of a headache 4/10 after lunch, received ibuprofen and reported some relief.    MSSA: 11 & 10    PRNs administered this shift: valium 10 mg x2, ibuprofen 600 mg    Discharge plans: Residential treatment TBD -  following

## 2021-11-18 NOTE — PROGRESS NOTES
MSSA scores of 8/5, pt receives 20 mg of valium for alcohol withdrawal this shift and is observed to sleep throughout the noc

## 2021-11-18 NOTE — PROGRESS NOTES
Writer met with patient on 11/18/21 to discuss aftercare plans. Patient is interested in residential treatment. Writer asked patient to complete assessment paperwork.    Patient has never attended CD treatment before. Patient reports drinking around 2 liters of Vodka per week. Patient also reports serious mental health concerns that she would like to address concurrent with CD treatment.    Writer will follow up up with patient later on in the pm.    UPDATE:    Writer contacted [facility] for CD IP referral for patient and spoke with a representative Viky Green ([phone]). Amir established a phone intake interview between patient and Viky on the same day at 2 pm. Amir faxed an STEPHANIE to Viky, and provided patient with representative's contact information.    Writer followed up with patient regarding intake with Viky. Patient reported that she attempted to contact Viky twice with both calls going to voicemail. Viky reported that she left a voicemail. Writer called Viky and left a voicemail with primary 's phone number for future contact.

## 2021-11-18 NOTE — CONSULTS
Internal Medicine Initial Visit      Tessa Barajas MRN# [id number]   YOB: 1990 Age: 31 year old   Date of Admission: 11/17/2021  PCP: Alka Norman    Referring Provider: Behavioral Health - Simone Osman MD  Reason for Visit: General Medical Evaluation     Assessment and Recommendations:   Tessa Barajas is a 31 year old year old woman with a history of alcohol use disorder, depression, anxiety, who was admitted to [facility] seeking detoxification from alcohol.    Alcohol withdrawal   Alcohol use disorder  Drinking about 1-2 liters weekly. Last drink yesterday. No history of withdrawal seizures or DTs. MSSA 11 this shift. LFTs wnl.    - Continue on MSSA protocol with benzodiazepines as indicated   - Management per Psychiatry   - Agree with MVI, folic acid, and thiamine supplements   - Notify medicine for SBP >180 or DBP >110    Sinus tachycardia: Most likely secondary to alcohol withdrawal.    - Notify Medicine if HR sustained >130 or for any chest pain, palpitations, dyspnea    Leukocytosis: WBC 13.2. No s/s of active infectious process. Most likely represents stress response in setting of withdrawal and/or hemoconcentration (hgb is slightly elevated as well).    - Repeat CBC in AM    Medicine will follow repeat labs peripherally. Please do not hesitate to contact if new questions or concerns arise.     Jane Serrano PA-C  [facility], [place]  Hospitalist Service  Pager: [phone]       Chief Complaint:   Alcohol withdrawal      History of Present Illness:     History is obtained from the patient and medical record.     Tessa Barajas is a 31 year old year old woman with a history of alcohol use disorder, depression, anxiety, who was admitted to [facility] seeking detoxification from alcohol. They have been drinking about 1-2 liters per week.  Last drink yesterday. Other substance use: none. No IVDU. No history of withdrawal seizures or DTs.      Current withdrawal symptoms: decreased appetite, shaky. Tolerating oral fluids without issue. She has no acute medical concerns today.     They are otherwise healthy with no ongoing medical problems. No history of cardiovascular or pulmonary disease. Not diabetic. Denies any chest pain, palpitations, dyspnea, cough, cold symptoms, fever/chills, abdominal pain, nausea/vomiting, rashes.             Review of Systems:   The 10 point Review of Systems is negative other than noted in the HPI or here.           Past Medical History:   Reviewed and updated in Epic.  Past Medical History:   Diagnosis Date     Abnormal Pap smear 12/2011     Anxiety      Cervical high risk HPV (human papillomavirus) test positive 01/09/2012 5/18/12, 7/23/15, 10/26/17     Headache disorder      History of colposcopy 08/07/2015 11/6/17     Intermittent asthma without complication, unspecified asthma severity 06/30/2020             Past Surgical History:   Reviewed and updated in Epic.  Past Surgical History:   Procedure Laterality Date     ABDOMEN SURGERY  1995    had quarter removed     HC TOOTH EXTRACTION W/FORCEP  2011             Social History:     Social History     Tobacco Use     Smoking status: Former Smoker     Packs/day: 0.00     Years: 0.00     Pack years: 0.00     Smokeless tobacco: Never Used     Tobacco comment: 2-3 cigs per day   Substance Use Topics     Alcohol use: Yes     Comment: 1-2 liters of vodka per week     Drug use: No             Family History:   Reviewed and updated in Epic.  Family History   Problem Relation Age of Onset     Family History Negative Mother      Family History Negative Father         bladder cancer     No Known Problems Brother      No Known Problems Daughter              Allergies:     Allergies   Allergen Reactions     Amoxicillin      Rash as a child     Cefzil [Cefprozil]      Rash as a child             Medications:     Medications Prior to Admission   Medication Sig Dispense Refill  Last Dose     albuterol (PROAIR HFA/PROVENTIL HFA/VENTOLIN HFA) 108 (90 Base) MCG/ACT inhaler Inhale 2 puffs into the lungs every 6 hours (Patient taking differently: Inhale 2 puffs into the lungs every 6 hours as needed for shortness of breath / dyspnea or wheezing ) 8.5 g 1 Past Week at Unknown time     sertraline (ZOLOFT) 50 MG tablet Take 1 tablet (50 mg) by mouth daily (Patient taking differently: Take 50 mg by mouth daily (with lunch) ) 90 tablet 1 11/16/2021 at Unknown time        Current Facility-Administered Medications   Medication     acetaminophen (TYLENOL) tablet 650 mg     albuterol (PROAIR HFA/PROVENTIL HFA/VENTOLIN HFA) 108 (90 Base) MCG/ACT inhaler 2 puff     alum & mag hydroxide-simethicone (MAALOX) suspension 30 mL     diazepam (VALIUM) tablet 5-20 mg     folic acid (FOLVITE) tablet 1 mg     hydrOXYzine (ATARAX) tablet 25 mg     ibuprofen (ADVIL/MOTRIN) tablet 600 mg     loperamide (IMODIUM) capsule 2 mg     multivitamin, therapeutic (THERA-VIT) tablet 1 tablet     ondansetron (ZOFRAN-ODT) ODT tab 4 mg     senna-docusate (SENOKOT-S/PERICOLACE) 8.6-50 MG per tablet 1 tablet     sertraline (ZOLOFT) tablet 50 mg     thiamine (B-1) tablet 100 mg     traZODone (DESYREL) tablet 50 mg            Physical Exam:   Blood pressure (!) 136/92, pulse 101, temperature 97.2  F (36.2  C), temperature source Temporal, resp. rate 16, last menstrual period 10/11/2021, SpO2 99 %, not currently breastfeeding.  There is no height or weight on file to calculate BMI.  Constitutional: Awake and alert, in no apparent distress.   Eyes: Sclera clear, anicteric   Respiratory: Breathing comfortably on room air. Clear to auscultation bilaterally with no crackles, wheezing, or rhonchi. Good air entry throughout.   Cardiovascular: Tachycardic, regular.  No rubs or murmurs.   GI: Soft, non-tender, non-distended.  Normoactive bowel sounds.   Skin:  Good color. No jaundice. No visible rashes, lesions, or bruising of concern.    Neurologic: Alert and fully oriented. No focal deficits.             Data:   CBC:  Recent Labs   Lab Test 11/17/21  1333   WBC 13.2*   RBC 4.93   HGB 15.8*   HCT 45.1   MCV 92   MCH 32.0   MCHC 35.0   RDW 12.9          CMP:  Recent Labs   Lab Test 11/17/21  1333      POTASSIUM 3.9   CHLORIDE 106   FEDE 9.2   CO2 24   BUN 13   CR 0.64   GLC 90   AST 29   ALT 42   BILITOTAL 0.9   ALBUMIN 4.3   PROTTOTAL 8.1   ALKPHOS 94       TSH:  TSH   Date Value Ref Range Status   05/19/2021 2.35 0.40 - 4.00 mU/L Final       Unresulted Labs Ordered in the Past 30 Days of this Admission     Date and Time Order Name Status Description    11/17/2021  5:44 PM Urine Culture In process

## 2021-11-18 NOTE — H&P
Tessa Barajas is a 31 year old female    History was provided by PATEINT who was a fair historian.   CHIEF COMPLAINT: Alcohol and depression    HISTORY OF PRESENT ILLNESS:    Patient is a 31-year-old  female with history of major depressive disorder generalized anxiety disorder ADHD.  Patient came to the emergency room wanting help for her drinking and for suicidal thinking.  She reports his been drinking heavily for the past 1 to 2 years  Because of her drinking she is not able to function she is keeping well she is having a lot of withdrawal.  She had nausea shaking vomiting.  The patient is also been worse and she has been cutting herself and she is having suicidal ideation.  Patient has been using the following substances: Alcohol  Started at age15 , became a problem at early 20's    Patient has tolerance, withdrawal, progressive use, loss of control, spending more time and more amount than intended. Patient has made attempts to quit, is experiencing cravings, and reports negative consequences.       Patient has increasing depression she reports she is  Crying a lot   Reports: depressed mood,+suicidal ideation no  Plan no intent  , decreased interest, changes in sleep, changes in appetite, guilt, hopelessness, helplessness,wothless  impaired concentration, decreased energy, irritability.         Patient does not have a history of seizures.  Patient does not have a history of delirium tremens.       Use pot occaionally         Denies thoughts of suicide or harming others.      Denies auditory or visual hallucinations.     Patient does not smokes    Patient denied any gambling    Substance Age first use First became regular or problematic Most recent use   Alcohol         Cannabis      Cocaine NONE       Stimulants NONE       Opioids NONE       Sedatives NONE       Hallucinogens NONE       Inhalants NONE       Other         OTC drugs NONE       Nicotine         Patient does not have a history of  overdose.  Patient does not have a history of IV use.  Patient does not have a history of hepatitis, HIV,  PSYCHIATRIC REVIEW OF SYSTEMS:         Psychiatric Review of Systems:   Depression: depressed since 16  Crying a lot   Reports: depressed mood,+suicidal ideation no  Plan no intent  , decreased interest, changes in sleep, changes in appetite, guilt, hopelessness, helplessness,wothless  impaired concentration, decreased energy, irritability.     Romy: Patient also reports she gets mood swings irritable, can function with 2 hrs try to a million things -clean everywhere at the same time   Reports: sleeplessness, impulsiveness spending sprees-[employer] , racing thoughts, increased goal-directed activities, pressured speech, increase in energy    Psychosis:     Denies: visual hallucinations, auditory hallucinations, paranoia  Anxiety:   Reports: excessive worries that are difficult to control for the past 6 months,   Chronic anxiety , not able to stop worrying impacting sleep, poor conc, irritable , muscle tension    Anxiety  Pt has following s/o of anxiety  Feeling anxious all the time,Excessive worry,Not able to stop worrying,Impacting sleep,Concentration,Muscle tension,Irritability,Fatigue   Panic attacks  Pt has following s/o of anxiety  Shortness of breath,Rapid heart rate,Sweaty,hand cringe ,Butterflies in the stomach,      Denies: worries that are difficult to control for the past 6 months, panic attacks  PTSD:   Reports: re-experiencing past trauma, nightmares, itrust issues, flashbacks,increased arousal, avoidance of traumatic stimuli, impaired function.    OCD:     Denies: obsessions, checking, symmetry, cleaning, skin picking.  ED:     Denies: restriction, binging, purging.    Denied any Symptoms of attention deficit disorder include a failure to pay attention to detail, a pattern of careless mistakes, a pattern of inattentive listening, a failure to follow through with projects, poor personal organization,  losing necessary objects, distractibility, forgetfulness.    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         Family History:   FAMILY HISTORY:   Family History   Problem Relation Age of Onset     Family History Negative Mother      Family History Negative Father         bladder cancer     No Known Problems Brother      No Known Problems Daughter      Family Mental Health History-  Depression in cousins maternal side     Substance Use Problems - present for maternal  uncles alocholism             PTA Medications:     Medications Prior to Admission   Medication Sig Dispense Refill Last Dose     albuterol (PROAIR HFA/PROVENTIL HFA/VENTOLIN HFA) 108 (90 Base) MCG/ACT inhaler Inhale 2 puffs into the lungs every 6 hours (Patient taking differently: Inhale 2 puffs into the lungs every 6 hours as needed for shortness of breath / dyspnea or wheezing ) 8.5 g 1 Past Week at Unknown time     sertraline (ZOLOFT) 50 MG tablet Take 1 tablet (50 mg) by mouth daily (Patient taking differently: Take 50 mg by mouth daily (with lunch) ) 90 tablet 1 11/16/2021 at Unknown time          Allergies:     Allergies   Allergen Reactions     Amoxicillin      Rash as a child     Cefzil [Cefprozil]      Rash as a child          Labs:     Recent Results (from the past 48 hour(s))   Drug abuse screen 1 urine (ED)    Collection Time: 11/17/21 12:37 PM   Result Value Ref Range    Amphetamines Urine Screen Negative Screen Negative    Barbiturates Urine Screen Negative Screen Negative     Benzodiazepines Urine Screen Negative Screen Negative    Cannabinoids Urine Screen Negative Screen Negative    Cocaine Urine Screen Negative Screen Negative    Opiates Urine Screen Negative Screen Negative   HCG qualitative urine (UPT)    Collection Time: 11/17/21 12:37 PM   Result Value Ref Range    hCG Urine Qualitative Negative Negative   UA with Microscopic reflex to Culture    Collection Time: 11/17/21 12:37 PM    Specimen: Urine, Midstream   Result Value Ref Range    Color Urine Orange (A) Colorless, Straw, Light Yellow, Yellow    Appearance Urine Cloudy (A) Clear    Glucose Urine Negative Negative mg/dL    Bilirubin Urine Negative Negative    Ketones Urine Negative Negative mg/dL    Specific Gravity Urine 1.025 1.003 - 1.035    Blood Urine Negative Negative    pH Urine 6.0 5.0 - 7.0    Protein Albumin Urine 50  (A) Negative mg/dL    Urobilinogen Urine Normal Normal, 2.0 mg/dL    Nitrite Urine Negative Negative    Leukocyte Esterase Urine Moderate (A) Negative    Bacteria Urine Moderate (A) None Seen /HPF    Mucus Urine Present (A) None Seen /LPF    RBC Urine 0 <=2 /HPF    WBC Urine 38 (H) <=5 /HPF    Squamous Epithelials Urine 31 (H) <=1 /HPF   Comprehensive metabolic panel    Collection Time: 11/17/21  1:33 PM   Result Value Ref Range    Sodium 137 133 - 144 mmol/L    Potassium 3.9 3.4 - 5.3 mmol/L    Chloride 106 94 - 109 mmol/L    Carbon Dioxide (CO2) 24 20 - 32 mmol/L    Anion Gap 7 3 - 14 mmol/L    Urea Nitrogen 13 7 - 30 mg/dL    Creatinine 0.64 0.52 - 1.04 mg/dL    Calcium 9.2 8.5 - 10.1 mg/dL    Glucose 90 70 - 99 mg/dL    Alkaline Phosphatase 94 40 - 150 U/L    AST 29 0 - 45 U/L    ALT 42 0 - 50 U/L    Protein Total 8.1 6.8 - 8.8 g/dL    Albumin 4.3 3.4 - 5.0 g/dL    Bilirubin Total 0.9 0.2 - 1.3 mg/dL    GFR Estimate >90 >60 mL/min/1.73m2   Ethyl Alcohol Level    Collection Time: 11/17/21  1:33 PM   Result Value Ref Range    Alcohol ethyl <0.01 <=0.01 g/dL   CBC with platelets and differential     Collection Time: 11/17/21  1:33 PM   Result Value Ref Range    WBC Count 13.2 (H) 4.0 - 11.0 10e3/uL    RBC Count 4.93 3.80 - 5.20 10e6/uL    Hemoglobin 15.8 (H) 11.7 - 15.7 g/dL    Hematocrit 45.1 35.0 - 47.0 %    MCV 92 78 - 100 fL    MCH 32.0 26.5 - 33.0 pg    MCHC 35.0 31.5 - 36.5 g/dL    RDW 12.9 10.0 - 15.0 %    Platelet Count 281 150 - 450 10e3/uL    % Neutrophils 83 %    % Lymphocytes 10 %    % Monocytes 5 %    % Eosinophils 1 %    % Basophils 0 %    % Immature Granulocytes 1 %    NRBCs per 100 WBC 0 <1 /100    Absolute Neutrophils 11.1 (H) 1.6 - 8.3 10e3/uL    Absolute Lymphocytes 1.3 0.8 - 5.3 10e3/uL    Absolute Monocytes 0.7 0.0 - 1.3 10e3/uL    Absolute Eosinophils 0.1 0.0 - 0.7 10e3/uL    Absolute Basophils 0.1 0.0 - 0.2 10e3/uL    Absolute Immature Granulocytes 0.1 (H) <=0.0 10e3/uL    Absolute NRBCs 0.0 10e3/uL   Asymptomatic COVID-19 Virus (Coronavirus) by PCR Oropharynx    Collection Time: 11/17/21  1:34 PM    Specimen: Oropharynx; Swab   Result Value Ref Range    SARS CoV2 PCR Negative Negative   TSH with free T4 reflex and/or T3 as indicated    Collection Time: 11/18/21  7:30 AM   Result Value Ref Range    TSH 2.05 0.40 - 4.00 mU/L   Lipid panel    Collection Time: 11/18/21  7:30 AM   Result Value Ref Range    Cholesterol 213 (H) <200 mg/dL    Triglycerides 204 (H) <150 mg/dL    Direct Measure HDL 51 >=50 mg/dL    LDL Cholesterol Calculated 121 (H) <=100 mg/dL    Non HDL Cholesterol 162 (H) <130 mg/dL   GGT    Collection Time: 11/18/21  7:30 AM   Result Value Ref Range    GGT 35 0 - 40 U/L   CBC with platelets and differential    Collection Time: 11/18/21  7:30 AM   Result Value Ref Range    WBC Count 8.1 4.0 - 11.0 10e3/uL    RBC Count 4.63 3.80 - 5.20 10e6/uL    Hemoglobin 14.7 11.7 - 15.7 g/dL    Hematocrit 43.1 35.0 - 47.0 %    MCV 93 78 - 100 fL    MCH 31.7 26.5 - 33.0 pg    MCHC 34.1 31.5 - 36.5 g/dL    RDW 13.0 10.0 - 15.0 %    Platelet Count 241 150 - 450 10e3/uL    % Neutrophils 68 %     % Lymphocytes 22 %    % Monocytes 8 %    % Eosinophils 2 %    % Basophils 0 %    % Immature Granulocytes 0 %    NRBCs per 100 WBC 0 <1 /100    Absolute Neutrophils 5.5 1.6 - 8.3 10e3/uL    Absolute Lymphocytes 1.8 0.8 - 5.3 10e3/uL    Absolute Monocytes 0.7 0.0 - 1.3 10e3/uL    Absolute Eosinophils 0.2 0.0 - 0.7 10e3/uL    Absolute Basophils 0.0 0.0 - 0.2 10e3/uL    Absolute Immature Granulocytes 0.0 <=0.0 10e3/uL    Absolute NRBCs 0.0 10e3/uL         BP (!) 136/92 (BP Location: Left arm)   Pulse 101   Temp 97.2  F (36.2  C) (Temporal)   Resp 16   LMP 10/11/2021   SpO2 99%   Breastfeeding No   Weight is 0 lbs 0 oz  There is no height or weight on file to calculate BMI.    Physical Exam:     ROS: 10 point ROS neg other than the symptoms noted above in the HPI.            Past Medical History:   PAST MEDICAL HISTORY:   Past Medical History:   Diagnosis Date     Abnormal Pap smear 12/2011     Anxiety      Cervical high risk HPV (human papillomavirus) test positive 01/09/2012 5/18/12, 7/23/15, 10/26/17     Headache disorder      History of colposcopy 08/07/2015 11/6/17     Intermittent asthma without complication, unspecified asthma severity 06/30/2020       PAST SURGICAL HISTORY:   Past Surgical History:   Procedure Laterality Date     ABDOMEN SURGERY  1995    had quarter removed     HC TOOTH EXTRACTION W/FORCEP  2011       -    -           MENTAL STATUS EXAM:      Constitutional: General appearance of patient:  Appearance:  awake, alert, appeared as age stated, adequate groomed and slightly unkempt  Attitude:  cooperative  Eye Contact:  good  Mood:  fine   Affect:  congruent   Speech:  clear, coherent normal rate   Psychomotor Behavior:  no evidence of tardive dyskinesia, dystonia, or tics  Thought Process:  logical, linear and goal oriented  Associations:  no loose associations  Thought Content:  no evidence of psychotic thought and active suicidal ideation present  Denied any active suicidal  /homicidation ideation plan intent   Insight:  fair  Judgment:  fair  Oriented to:  time, person, and place  Attention Span and Concentration:  intact  Recent and Remote Memory:  intact  Language:  english with appropriate syntax and vocabulary  Fund of Knowledge: appropriate  Muscle Strength and Tone: normal  Gait and Station: Normal     There are no abnormal or psychotic thoughts, no preoccupations, no overvalued ideas, no rumination, no obsessions, no compulsions, no somatic concerns, no hypochrondriasis, no ideas of reference, and no delusions.  Patient denies homicidal thoughts.   Patient denies suicidal thoughts.  Patient appears to have good judgment and good insight.     Musculoskeletal: Patient shows no abnormalities of motor activity: there is no tremor, no tic, and no dystonia.  There is no apparent muscle atrophy, strength and tone appear normal, and there are no abnormal movements.  Patient has normal gait and stance.    DISCUSSION:         Assessment:       Patient has a biological predisposition with family history positive for mental illness chemical dependency  Psychologically patient is experiencing neurovegetative symptoms of depression with suicidal ideation and self-injurious behavior anxiety alcoholism  Patient has these particular stressors not able to function has a job single parent  Patient has chronic illness exacerbation leading to hospitalization progression as described.     Patient has been unable to stop using drugs in the community due to both physical and psychological symptoms.  Continued use will put the patient at risk for medical and/or psychiatric complications.      Inpatient psychiatric hospitalization is warranted at this time for safety, stabilization, and possible adjustment in medications.       Diagnoses:    Major depressive disorder moderate recurrent without psychosis  Rule out bipolar affective disorder most recent episode depressed without psychosis  Alcohol use  disorder severe  Alcohol withdrawal severe  Generalized anxiety disorder  PTSD          Plan:   Problem list  1#alcohol use disorder severe alcohol withdrawal severe     - [facility] Valium for management of alcohol withdrawal  Patient has a pulse of 101 blood pressure 136/92 eating disturbance tremor sweats patient has a scored 11 and received 10 mg of diazepam    Since admission patient received 55 mg of diazepam  - Continue thiamine, folate, and multivitamin daily    2#patient is read about bipolar affect disorder believes that she could have symptoms of bipolar  She reports that she can go a couple of days without sleeping she feels very happy she feels more things that she can do like cleaning sprees she has mood swings she gets up and blood night and talks she is distractible and impulsive racing thoughts take somewhat and then she can do  She is willing to try Depakote will add Depakote 5 mg twice a day patient was instructed not to be compliant on it  She will continue Zoloft 50 mg to target her PTSD and anxiety disorder    Patient is switched to MICD from time of admission      - Consider anti-craving medications prior to discharge. Pt willing to review additional information about both naltrexone and Antabuse.    Alcohol withdrawal nausea prn Zofran as needed for nausea     hydroxyzine 25 mg q4h prn for acute anxiety  Trazodone 50 mg at bedtime prn for sleep disturbances       Patient has been unable to stop using drugs in the community due to both physical and psychological symptoms.  Continued use will put the patient at risk for medical and/or psychiatric complications.    I HAVE REVIEWED LABS WITH PT AND TALKED ABOUT RESULTS WITH PT  I HAVE REVIEWED AND SUMMARIZED OLD RECORDS including his medication reconcilation of his home medications  and PDMP   I HAVE SPOKEN WITH RN ABOUT MEDICATIONS AND DETOX SCORES  I HAVE SPOKEN WITH CM ABOUT PTS TREATMENT OPTIONS             Laboratory/Imaging:     Liver Function Studies -   Recent Labs   Lab Test 11/17/21  1333   PROTTOTAL 8.1   ALBUMIN 4.3   BILITOTAL 0.9   ALKPHOS 94   AST 29   ALT 42      Last Comprehensive Metabolic Panel:  Sodium   Date Value Ref Range Status   11/17/2021 137 133 - 144 mmol/L Final   05/19/2021 137 133 - 144 mmol/L Final     Potassium   Date Value Ref Range Status   11/17/2021 3.9 3.4 - 5.3 mmol/L Final   05/19/2021 3.8 3.4 - 5.3 mmol/L Final     Chloride   Date Value Ref Range Status   11/17/2021 106 94 - 109 mmol/L Final   05/19/2021 105 94 - 109 mmol/L Final     Carbon Dioxide   Date Value Ref Range Status   05/19/2021 25 20 - 32 mmol/L Final     Carbon Dioxide (CO2)   Date Value Ref Range Status   11/17/2021 24 20 - 32 mmol/L Final     Anion Gap   Date Value Ref Range Status   11/17/2021 7 3 - 14 mmol/L Final   05/19/2021 7 3 - 14 mmol/L Final     Glucose   Date Value Ref Range Status   11/17/2021 90 70 - 99 mg/dL Final   05/19/2021 70 70 - 99 mg/dL Final     Urea Nitrogen   Date Value Ref Range Status   11/17/2021 13 7 - 30 mg/dL Final   05/19/2021 9 7 - 30 mg/dL Final     Creatinine   Date Value Ref Range Status   11/17/2021 0.64 0.52 - 1.04 mg/dL Final   05/19/2021 0.76 0.52 - 1.04 mg/dL Final     GFR Estimate   Date Value Ref Range Status   11/17/2021 >90 >60 mL/min/1.73m2 Final     Comment:     As of July 11, 2021, eGFR is calculated by the CKD-EPI creatinine equation, without race adjustment. eGFR can be influenced by muscle mass, exercise, and diet. The reported eGFR is an estimation only and is only applicable if the renal function is stable.   05/19/2021 >90 >60 mL/min/[1.73_m2] Final     Comment:     Non  GFR Calc  Starting 12/18/2018, serum creatinine based estimated GFR (eGFR) will be   calculated using the Chronic Kidney Disease Epidemiology Collaboration   (CKD-EPI) equation.       Calcium   Date Value Ref Range Status   11/17/2021 9.2 8.5 - 10.1 mg/dL Final   05/19/2021 9.0 8.5 - 10.1 mg/dL Final  "    Bilirubin Total   Date Value Ref Range Status   11/17/2021 0.9 0.2 - 1.3 mg/dL Final   05/19/2021 0.6 0.2 - 1.3 mg/dL Final     Alkaline Phosphatase   Date Value Ref Range Status   11/17/2021 94 40 - 150 U/L Final   05/19/2021 74 40 - 150 U/L Final     ALT   Date Value Ref Range Status   11/17/2021 42 0 - 50 U/L Final   05/19/2021 36 0 - 50 U/L Final     AST   Date Value Ref Range Status   11/17/2021 29 0 - 45 U/L Final   05/19/2021 20 0 - 45 U/L Final                   Medical treatment/interventions:  Medical concerns: As above    - Consults: IM consult placed. Appreciate assistance.     Legal Status: Voluntary     Safety Assessment:   Checks: Status 15  Pt has not required locked seclusion or restraints in the past 24 hours to maintain safety, please refer to RN documentation for further details.    The risks, benefits, alternatives and side effects have been discussed and are understood by the patient.       Patient will be treated in therapeutic milieu with appropriate individual and group therapies as described.  Disposition: Pending clinical stabilization. Pt does  appear interested in COMPLETE DETOX AND DO TRT  Length of stay 3-5 days        \"Much or all of the text in this note was generated through the use of Dragon Dictate voice to text software. Errors in spelling or words which appear to be out of contact are unintentional, may be present due having escaped editing\"     "

## 2021-11-18 NOTE — PLAN OF CARE
Behavioral Team Discussion: (11/18/2021)    Continued Stay Criteria/Rationale: Patient admitted for alcohol withdrawal, complicated by suicidal ideation.  Plan: The following services will be provided to the patient; psychiatric assessment, medication management, therapeutic milieu, individual and group support, and skills groups.   Participants: 3A Provider: Dr. Simone Osman MD; 3A RN:  Liliana Garcia, RN; 3A CM's: Lesli Butcher  and Alexandrea Onofre.  Summary/Recommendation: Providers will assess today for treatment recommendations, discharge planning, and aftercare plans. CM will meet with pt for discharge planning.   Medical/Physical: Internal medicine consult to be completed 11/18/2021.  Precautions:   Behavioral Orders   Procedures    Code 1 - Restrict to Unit    Discontinue 1:1 attendant for suicide risk     Order Specific Question:   I have performed an in person assessment of the patient     Answer:   Based on this assessment the patient no longer requires a one on one attendant at this point in time.     Order Specific Question:   Rationale     Answer:   Patient States able to remain safe in hospital    Routine Programming     As clinically indicated    Self Injury Precaution    Status 15     Every 15 minutes.    Withdrawal precautions     Rationale for change in precautions or plan: N/A  Progress: No Change.    PT SEEN   AGREE WITH ASSESSMENT AND PLAN

## 2021-11-18 NOTE — PROGRESS NOTES
11/17/21 2047   Patient Belongings   Did you bring any home meds/supplements to the hospital?  Yes   Disposition of meds  Sent to security/pharmacy per site process   Patient Belongings other (see comments)   Belongings Search Yes   Clothing Search Yes   Second Staff Liliana Hamilton     STORAGE BIN:    Purse, wallet, keys, sunglasses,  coat    MED ROOM BIN:   Cell phone, inhaler    SECURITY:   Med, 2 id, visa  A             Admission:  I am responsible for any personal items that are not sent to the safe or pharmacy.  [facility] is not responsible for loss, theft or damage of any property in my possession.    Signature:  _________________________________ Date: _______  Time: _____                                              Staff Signature:  ____________________________ Date: ________  Time: _____      2nd Staff person, if patient is unable/unwilling to sign:    Signature: ________________________________ Date: ________  Time: _____   Discharge:  [facility] has returned all of my personal belongings:    Signature: _________________________________ Date: ________  Time: _____                                          Staff Signature:  ____________________________ Date: ________  Time: _____

## 2021-11-18 NOTE — PLAN OF CARE
Number of patients attending the group:  8   Group Length:  1 Hours    Group Therapy     Summary of Group / Topics Discussed:      The  Psychotherapy group goal is to promote insight to positive choice and change. Group processing is within a supportive and safe environment. Patients will process emotions using verbal group and expressive psychotherapy interventions.        Assessment: Discussed the Autobiography in Five Short Chapters by Sandy Webb. Group members discussed how this resonated with them regarding relapse and addiction. Some group members also discussed how this may impact other areas of their lives that require change. The discussion developed into creating change to 'walk down a new street'. CTC gave handouts on stages of change, discussed how relapse can be an upward learning spiral. Each member discussed what they learned from this relapse and what they might need to do differently.             Patient Response-Pt was quiet during group, shared some of her anticipated difficulties when discharged and what may help.

## 2021-11-19 LAB
ERYTHROCYTE [DISTWIDTH] IN BLOOD BY AUTOMATED COUNT: 12.9 % (ref 10–15)
HCT VFR BLD AUTO: 38 % (ref 35–47)
HGB BLD-MCNC: 13.2 G/DL (ref 11.7–15.7)
MCH RBC QN AUTO: 32.3 PG (ref 26.5–33)
MCHC RBC AUTO-ENTMCNC: 34.7 G/DL (ref 31.5–36.5)
MCV RBC AUTO: 93 FL (ref 78–100)
PLATELET # BLD AUTO: 200 10E3/UL (ref 150–450)
RBC # BLD AUTO: 4.09 10E6/UL (ref 3.8–5.2)
WBC # BLD AUTO: 6.9 10E3/UL (ref 4–11)

## 2021-11-19 PROCEDURE — 99207 PR CDG-MDM COMPONENT: MEETS MODERATE - UP CODED: CPT | Performed by: PSYCHIATRY & NEUROLOGY

## 2021-11-19 PROCEDURE — G0177 OPPS/PHP; TRAIN & EDUC SERV: HCPCS

## 2021-11-19 PROCEDURE — 250N000011 HC RX IP 250 OP 636: Performed by: PSYCHIATRY & NEUROLOGY

## 2021-11-19 PROCEDURE — 91303 HC RX IP 250 OP 636: CPT | Performed by: PSYCHIATRY & NEUROLOGY

## 2021-11-19 PROCEDURE — 250N000013 HC RX MED GY IP 250 OP 250 PS 637: Performed by: NURSE PRACTITIONER

## 2021-11-19 PROCEDURE — 99232 SBSQ HOSP IP/OBS MODERATE 35: CPT | Performed by: PSYCHIATRY & NEUROLOGY

## 2021-11-19 PROCEDURE — 250N000013 HC RX MED GY IP 250 OP 250 PS 637: Performed by: PSYCHIATRY & NEUROLOGY

## 2021-11-19 PROCEDURE — 128N000001 HC R&B CD/MH ADULT

## 2021-11-19 PROCEDURE — 36415 COLL VENOUS BLD VENIPUNCTURE: CPT | Performed by: PHYSICIAN ASSISTANT

## 2021-11-19 PROCEDURE — 85027 COMPLETE CBC AUTOMATED: CPT | Performed by: PHYSICIAN ASSISTANT

## 2021-11-19 RX ORDER — DIPHENHYDRAMINE HCL 50 MG
50 CAPSULE ORAL
Status: DISCONTINUED | OUTPATIENT
Start: 2021-11-19 | End: 2021-11-22 | Stop reason: HOSPADM

## 2021-11-19 RX ORDER — ACETAMINOPHEN 325 MG/1
650 TABLET ORAL EVERY 4 HOURS PRN
Status: DISCONTINUED | OUTPATIENT
Start: 2021-11-19 | End: 2021-11-22 | Stop reason: HOSPADM

## 2021-11-19 RX ORDER — DIPHENHYDRAMINE HYDROCHLORIDE 50 MG/ML
50 INJECTION INTRAMUSCULAR; INTRAVENOUS
Status: DISCONTINUED | OUTPATIENT
Start: 2021-11-19 | End: 2021-11-22 | Stop reason: HOSPADM

## 2021-11-19 RX ADMIN — DIVALPROEX SODIUM 250 MG: 250 TABLET, FILM COATED, EXTENDED RELEASE ORAL at 20:20

## 2021-11-19 RX ADMIN — SERTRALINE HYDROCHLORIDE 50 MG: 50 TABLET ORAL at 08:20

## 2021-11-19 RX ADMIN — THIAMINE HCL TAB 100 MG 100 MG: 100 TAB at 08:20

## 2021-11-19 RX ADMIN — MULTIPLE VITAMINS W/ MINERALS TAB 1 TABLET: TAB at 08:20

## 2021-11-19 RX ADMIN — DIVALPROEX SODIUM 250 MG: 250 TABLET, FILM COATED, EXTENDED RELEASE ORAL at 08:20

## 2021-11-19 RX ADMIN — JNJ-78436735 0.5 ML: 50000000000 SUSPENSION INTRAMUSCULAR at 14:51

## 2021-11-19 RX ADMIN — FOLIC ACID 1 MG: 1 TABLET ORAL at 08:20

## 2021-11-19 ASSESSMENT — ACTIVITIES OF DAILY LIVING (ADL)
LAUNDRY: WITH SUPERVISION
ORAL_HYGIENE: INDEPENDENT
HYGIENE/GROOMING: INDEPENDENT
HYGIENE/GROOMING: INDEPENDENT
DRESS: INDEPENDENT
DRESS: INDEPENDENT
ORAL_HYGIENE: INDEPENDENT

## 2021-11-19 NOTE — PLAN OF CARE
Problem: Adult Inpatient Plan of Care  Goal: Plan of Care Review  Outcome: Improving     Tessa was out and visible in the milieu throughout the shift. She socialized and interacted appropriately with staff and peers. Affect blunted but brightens on approach. She denied SI/SIB/hallucinations. MSSA score was 5. Did not require any prn medication to manage withdrawal symptoms. No safety or behavioral concerns. Will continue to monitor.

## 2021-11-19 NOTE — PLAN OF CARE
Problem: Adult Inpatient Plan of Care  Goal: Optimal Comfort and Wellbeing  Outcome: No Change     Behavioral  Pt appeared sleeping comfortably overnight; breathing was even and unlabored.      Medical  Pt continues in alcohol withdrawal; MSSA 4, no valium given this shift; Pt last valium on 11/18 @ 1222.      No new medical concerns noted.

## 2021-11-19 NOTE — PROGRESS NOTES
[facility], [place]   Psychiatric Progress Note        Interim history   This is a 31 year old female with Major depressive disorder moderate recurrent without psychosis  Rule out bipolar affective disorder most recent episode depressed without psychosis  Alcohol use disorder severe  Alcohol withdrawal severe  Generalized anxiety disorder  PTSDPt seen in rounds.   The patient's care was discussed with the treatment team during the daily team meeting and/or staff's chart notes were reviewed.  Staff report patient has been visible in the milieu,  no acute eventsovernight.     Patient's mood is content   Energy Level:so-so  Sleep:No concerns, sleeps well through night  Appetite:fair motivation interest   Suicidal/homicidal ideation/plan intent.psychosis  No prior suicde attempts  No access to gun  Pt is in alcohol withdrawal still being monitered every 4 hrs for it,   Pt mssa score are monitered  Tolerating meds and has no side effects.              Medications:     Current Facility-Administered Medications   Medication     acetaminophen (TYLENOL) tablet 650 mg     albuterol (PROAIR HFA/PROVENTIL HFA/VENTOLIN HFA) 108 (90 Base) MCG/ACT inhaler 2 puff     alum & mag hydroxide-simethicone (MAALOX) suspension 30 mL     diazepam (VALIUM) tablet 5-20 mg     divalproex sodium extended-release (DEPAKOTE ER) 24 hr tablet 250 mg     folic acid (FOLVITE) tablet 1 mg     hydrOXYzine (ATARAX) tablet 25 mg     ibuprofen (ADVIL/MOTRIN) tablet 600 mg     loperamide (IMODIUM) capsule 2 mg     multivitamin, therapeutic (THERA-VIT) tablet 1 tablet     ondansetron (ZOFRAN-ODT) ODT tab 4 mg     senna-docusate (SENOKOT-S/PERICOLACE) 8.6-50 MG per tablet 1 tablet     sertraline (ZOLOFT) tablet 50 mg     thiamine (B-1) tablet 100 mg     traZODone (DESYREL) tablet 50 mg             Allergies:     Allergies   Allergen Reactions     Amoxicillin      Rash as a child     Cefzil [Cefprozil]      Rash as a child             Psychiatric Examination:   Blood pressure 130/89, pulse 87, temperature 98  F (36.7  C), temperature source Temporal, resp. rate 15, last menstrual period 10/11/2021, SpO2 99 %, not currently breastfeeding.  Weight is 0 lbs 0 oz  There is no height or weight on file to calculate BMI.    Appearance:  awake, alert and adequately groomed  Attitude:  cooperative  Eye Contact:  good  Mood:  content   Affect:  appropriate and in normal range and mood congruent  Speech:  clear, coherent rate /rhythm are good  Psychomotor Behavior:  no evidence of tardive dyskinesia, dystonia, or tics and intact station, gait and muscle tone  Throught Process:  logical  Associations:  no loose associations  Thought Content:  no evidence of suicidal ideation or homicidal ideation, no evidence of psychotic thought, no auditory hallucinations present and no visual hallucinations present  Insight:  fair  Judgement:  intact  Oriented to:  time, person, and place  Attention Span and Concentration:  intact  Recent and Remote Memory:  intact  Language fund of knowledge are adequate         Labs:     Recent Results (from the past 24 hour(s))   Asymptomatic COVID-19 Virus (Coronavirus) by PCR Nasopharyngeal    Collection Time: 11/18/21  8:05 PM    Specimen: Nasopharyngeal; Swab   Result Value Ref Range    SARS CoV2 PCR Negative Negative   CBC with platelets    Collection Time: 11/19/21  6:49 AM   Result Value Ref Range    WBC Count 6.9 4.0 - 11.0 10e3/uL    RBC Count 4.09 3.80 - 5.20 10e6/uL    Hemoglobin 13.2 11.7 - 15.7 g/dL    Hematocrit 38.0 35.0 - 47.0 %    MCV 93 78 - 100 fL    MCH 32.3 26.5 - 33.0 pg    MCHC 34.7 31.5 - 36.5 g/dL    RDW 12.9 10.0 - 15.0 %    Platelet Count 200 150 - 450 10e3/uL         DX 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 Continue the same MSSA protocol as ordered. Continue thiamine 100 mg p.o. daily, M.V.I. one p.o. daily and folate 1 mg p.o. Daily  Will continue mssa protocal to detox off alcohol on valium,  Pt is c/o of termor , agitation poor sleep and poor appetite, he has sweats, feels shakey  On mssa client scored scored today and  needed needed 0 mg po as of yet , total dose since admission was 65mg    MSSA    Eating Disturbances: ate and enjoyed all of it or not applicable  Tremor: 1 - not visibly apparent but can be felt by the examiner placing his fingertip slightly against the patient's fingertips  Sleep Disturbance: slept through the night or not applicable  Clouding of Sensorium: no evidence  Hallucinations: 0 - none  Quality of Contact: 0 - awareness of examiner and people around him/her  Agitation: 0 - normal activity  Paroxysmal Sweats: 1 - barely perceptible sweating  Temperature: 99.5 or below  Pulse: 2 - 80 to 89  Total MSSA Score: 5    Pt read about bipolar and now feels it describes her   conitnue depakote and zoloft at present doses   Laboratory/Imaging: reviewed with patient   Consults: internal medicine consult reviewed  Patient will be treated in therapeutic milieu with appropriate individual and group therapies as described.  PDMP CHECKED     Supportive psychotheraoy provided, solange talked about recovery enviroment, relapse prevention, triggers to use.  Discussed with patient many issues of addiction,triggers, relapse, and establishing a solid recovery program.  Asked pt to be med complinat   Medical diagnoses to be addressed this admission:    Plan:  Assessment and Recommendations:   Tessa Barajas is a 31 year old year old woman with a history of alcohol use disorder, depression, anxiety, who was admitted to station 3A seeking detoxification from alcohol.     Alcohol withdrawal   Alcohol use  disorder  Drinking about 1-2 liters weekly. Last drink yesterday. No history of withdrawal seizures or DTs. MSSA 11 this shift. LFTs wnl.    - Continue on MSSA protocol with benzodiazepines as indicated   - Management per Psychiatry   - Agree with MVI, folic acid, and thiamine supplements   - Notify medicine for SBP >180 or DBP >110     Sinus tachycardia: Most likely secondary to alcohol withdrawal.    - Notify Medicine if HR sustained >130 or for any chest pain, palpitations, dyspnea     Leukocytosis: WBC 13.2. No s/s of active infectious process. Most likely represents stress response in setting of withdrawal and/or hemoconcentration (hgb is slightly elevated as well).    - Repeat CBC in AM      Legal Status: voluntary    Safety Assessment:   Checks:  15 min  Precautions: withdrawal precautions  Pt has not required locked seclusion or restraints in the past 24 hours to maintain safety, please refer to RN documentation for further details.  Discussed with patient many issues of addiction,triggers, relapse, and establishing a solid recovery program.  Able to give informed consent:  YES   Discussed Risks/Benefits/Side Effects/Alternatives: YES    After discussion of the indications, risks, benefits, alternatives and consequences of no treatment, the patient elects to complete detox and do trt

## 2021-11-19 NOTE — PROGRESS NOTES
"Spoke with pt who reports she was able to speak with [facility] yesterday for phone screen and assessment was scheduled for today, 11/19/2021 at 11:00 am. Placed call to [facility], spoke with Galina who confirms phone assessment at 11:00 with \"[facility]\" at [phone].     Update: Pt states she is doing outpatient treatment, not residential. States she owns a home and lives in [place]. States she has joint custody with her and ex-. Reports she will be doing outpatient treatment with [facility], is waiting for call back from [facility] to schedule intake and discuss location of outpatient program. Reports she believes it is in [place]. Pt states she will get her first Covid vaccine and do virtual for 2 weeks then switch to in-person outpatient treatment. Possible that pt may discharge home over the weekend. AVS updated with treatment program information. If pt is still here on Monday 11/22 writer will continue to assist with scheduling intake.  "

## 2021-11-20 PROCEDURE — 250N000013 HC RX MED GY IP 250 OP 250 PS 637: Performed by: NURSE PRACTITIONER

## 2021-11-20 PROCEDURE — 128N000001 HC R&B CD/MH ADULT

## 2021-11-20 PROCEDURE — 250N000013 HC RX MED GY IP 250 OP 250 PS 637: Performed by: PSYCHIATRY & NEUROLOGY

## 2021-11-20 RX ADMIN — DIVALPROEX SODIUM 250 MG: 250 TABLET, FILM COATED, EXTENDED RELEASE ORAL at 08:43

## 2021-11-20 RX ADMIN — TRAZODONE HYDROCHLORIDE 50 MG: 50 TABLET ORAL at 21:38

## 2021-11-20 RX ADMIN — DIVALPROEX SODIUM 250 MG: 250 TABLET, FILM COATED, EXTENDED RELEASE ORAL at 19:59

## 2021-11-20 RX ADMIN — MULTIPLE VITAMINS W/ MINERALS TAB 1 TABLET: TAB at 08:43

## 2021-11-20 RX ADMIN — THIAMINE HCL TAB 100 MG 100 MG: 100 TAB at 08:43

## 2021-11-20 RX ADMIN — SERTRALINE HYDROCHLORIDE 50 MG: 50 TABLET ORAL at 08:43

## 2021-11-20 RX ADMIN — FOLIC ACID 1 MG: 1 TABLET ORAL at 08:43

## 2021-11-20 RX ADMIN — IBUPROFEN 600 MG: 600 TABLET ORAL at 16:32

## 2021-11-20 ASSESSMENT — ACTIVITIES OF DAILY LIVING (ADL)
DRESS: INDEPENDENT
ORAL_HYGIENE: INDEPENDENT
HYGIENE/GROOMING: INDEPENDENT

## 2021-11-20 NOTE — PLAN OF CARE
"Pt is currently \"out of detox\". She is pleasant and cooperative.  She denies SI/SIB/HI. She is waiting to discharge to [facility] for treatment.  /87   Pulse 103   Temp 97.5  F (36.4  C) (Temporal)   Resp 16   LMP 10/11/2021   SpO2 99%   Breastfeeding No    "

## 2021-11-20 NOTE — PLAN OF CARE
"Pt meets all criteria to be removed from MSSA monitoring. Per unit protocol, Pt now \"out of detox\".   "

## 2021-11-20 NOTE — PLAN OF CARE
Problem: Alcohol Withdrawal  Goal: Alcohol Withdrawal Symptom Control  Outcome: Improving   Pt reports minimal withdrawal symptoms.   She is eating well, drinking fluids, she maintains a slight tremor--HR > 100 bpm--pt reports this as usual for her during withdrawal.  Pt is participating in station programming, she is coordinating FMLA with her employer and making a discharge plan for [facility] upon discharge.

## 2021-11-20 NOTE — PROGRESS NOTES
11/20/21 0606   Sleep/Rest/Relaxation   Sleep/Rest/Relaxation appears asleep   Night Time # Hours 6.75 hours     MSSA=4; patient denied having withdrawal symptoms. We'll continue to monitor.

## 2021-11-21 PROCEDURE — 250N000013 HC RX MED GY IP 250 OP 250 PS 637: Performed by: PSYCHIATRY & NEUROLOGY

## 2021-11-21 PROCEDURE — 250N000013 HC RX MED GY IP 250 OP 250 PS 637: Performed by: NURSE PRACTITIONER

## 2021-11-21 PROCEDURE — 128N000001 HC R&B CD/MH ADULT

## 2021-11-21 PROCEDURE — H2032 ACTIVITY THERAPY, PER 15 MIN: HCPCS

## 2021-11-21 RX ADMIN — FOLIC ACID 1 MG: 1 TABLET ORAL at 08:17

## 2021-11-21 RX ADMIN — TRAZODONE HYDROCHLORIDE 50 MG: 50 TABLET ORAL at 21:19

## 2021-11-21 RX ADMIN — DIVALPROEX SODIUM 250 MG: 250 TABLET, FILM COATED, EXTENDED RELEASE ORAL at 08:17

## 2021-11-21 RX ADMIN — MULTIPLE VITAMINS W/ MINERALS TAB 1 TABLET: TAB at 08:17

## 2021-11-21 RX ADMIN — SERTRALINE HYDROCHLORIDE 50 MG: 50 TABLET ORAL at 08:17

## 2021-11-21 RX ADMIN — THIAMINE HCL TAB 100 MG 100 MG: 100 TAB at 08:17

## 2021-11-21 RX ADMIN — DIVALPROEX SODIUM 250 MG: 250 TABLET, FILM COATED, EXTENDED RELEASE ORAL at 19:59

## 2021-11-21 ASSESSMENT — ACTIVITIES OF DAILY LIVING (ADL)
HYGIENE/GROOMING: INDEPENDENT
HYGIENE/GROOMING: INDEPENDENT
ORAL_HYGIENE: INDEPENDENT
ORAL_HYGIENE: INDEPENDENT
DRESS: INDEPENDENT
DRESS: INDEPENDENT

## 2021-11-21 NOTE — PROGRESS NOTES
11/21/21 0612   Sleep/Rest/Relaxation   Sleep/Rest/Relaxation appears asleep   Night Time # Hours 6.75 hours     No concerns.

## 2021-11-21 NOTE — PLAN OF CARE
Pt awake and alert this evening.  Eating well and drinking fluids--reports minimal withdrawal symptoms and was was removed from detox monitoring earlier today.  Pt continues to make plans with the assistance of her mother to attend a [facility] program.  She reports that administrative services aren't available over the weekend to confirm plans for next week.  She is hoping to confirm her plans on Monday and hopefully discharge at that time.  Will continue to assist patient with plans for next level of care.

## 2021-11-21 NOTE — PROGRESS NOTES
"Tessa attended the first ten minutes of group. She reported feeling \"good\" today and left after check in. (no charge)  "

## 2021-11-21 NOTE — PLAN OF CARE
Pt attended the structured Therapeutic Recreation group, participating in a group activity. Pt participated in group discussion and leisure participation as a healthy outlet to gain self-esteem, manage behaviors, improve social skills, and decrease isolation.  Pt remained focused and engaged throughout group activity.  Pt mood was sociable and was appropriate with interactions, contributing to the clues and descriptions throughout the activity. Pt was a full participant for the duration of the group. Pt shared positive benefits for the activity at the end of the group.

## 2021-11-21 NOTE — PLAN OF CARE
"Pt is currently \"out of detox\". She is pleasant and cooperative. She is visible in the lounge and participates in unit activities. She denies SI/SIB/HI. Her parents came to visit today. She awaiting placement at [facility].  /89   Pulse 106   Temp 97.5  F (36.4  C) (Temporal)   Resp 16   LMP 10/11/2021   SpO2 98%   Breastfeeding No    "

## 2021-11-22 VITALS
DIASTOLIC BLOOD PRESSURE: 86 MMHG | TEMPERATURE: 97.7 F | HEART RATE: 97 BPM | RESPIRATION RATE: 16 BRPM | SYSTOLIC BLOOD PRESSURE: 122 MMHG | OXYGEN SATURATION: 99 %

## 2021-11-22 PROCEDURE — 250N000013 HC RX MED GY IP 250 OP 250 PS 637: Performed by: PSYCHIATRY & NEUROLOGY

## 2021-11-22 PROCEDURE — 99239 HOSP IP/OBS DSCHRG MGMT >30: CPT | Performed by: PSYCHIATRY & NEUROLOGY

## 2021-11-22 PROCEDURE — 250N000013 HC RX MED GY IP 250 OP 250 PS 637: Performed by: NURSE PRACTITIONER

## 2021-11-22 RX ORDER — NALTREXONE HYDROCHLORIDE 50 MG/1
50 TABLET, FILM COATED ORAL DAILY
Status: DISCONTINUED | OUTPATIENT
Start: 2021-11-22 | End: 2021-11-22 | Stop reason: HOSPADM

## 2021-11-22 RX ORDER — NALTREXONE HYDROCHLORIDE 50 MG/1
50 TABLET, FILM COATED ORAL DAILY
Qty: 30 TABLET | Refills: 0 | Status: SHIPPED | OUTPATIENT
Start: 2021-11-22 | End: 2021-12-14

## 2021-11-22 RX ORDER — FOLIC ACID 1 MG/1
1 TABLET ORAL DAILY
Qty: 30 TABLET | Refills: 1 | Status: SHIPPED | OUTPATIENT
Start: 2021-11-22 | End: 2021-12-14

## 2021-11-22 RX ORDER — MULTIVITAMIN,THERAPEUTIC
1 TABLET ORAL DAILY
Start: 2021-11-22

## 2021-11-22 RX ORDER — LANOLIN ALCOHOL/MO/W.PET/CERES
100 CREAM (GRAM) TOPICAL DAILY
Qty: 30 TABLET | Refills: 0 | Status: SHIPPED | OUTPATIENT
Start: 2021-11-22 | End: 2021-12-14

## 2021-11-22 RX ORDER — DIVALPROEX SODIUM 250 MG/1
250 TABLET, EXTENDED RELEASE ORAL 2 TIMES DAILY
Qty: 60 TABLET | Refills: 1 | Status: SHIPPED | OUTPATIENT
Start: 2021-11-22 | End: 2021-12-14

## 2021-11-22 RX ADMIN — MULTIPLE VITAMINS W/ MINERALS TAB 1 TABLET: TAB at 08:42

## 2021-11-22 RX ADMIN — SERTRALINE HYDROCHLORIDE 50 MG: 50 TABLET ORAL at 08:41

## 2021-11-22 RX ADMIN — NALTREXONE HYDROCHLORIDE 50 MG: 50 TABLET, FILM COATED ORAL at 08:41

## 2021-11-22 RX ADMIN — THIAMINE HCL TAB 100 MG 100 MG: 100 TAB at 08:41

## 2021-11-22 RX ADMIN — DIVALPROEX SODIUM 250 MG: 250 TABLET, FILM COATED, EXTENDED RELEASE ORAL at 08:42

## 2021-11-22 RX ADMIN — FOLIC ACID 1 MG: 1 TABLET ORAL at 08:42

## 2021-11-22 NOTE — DISCHARGE SUMMARY
Psychiatric Discharge Summary    Tessa Barajas MRN# [id number]   Age: 31 year old YOB: 1990     Date of Admission:  11/17/2021  Date of Discharge:  11/22/2021  Admitting Physician:  Simone Omsan MD  Discharge Physician:  Cathleen Haskins MD (Contact: [phone])         Event Leading to Hospitalization:   Per H&P:    Patient is a 31-year-old  female with history of major depressive disorder generalized anxiety disorder ADHD.  Patient came to the emergency room wanting help for her drinking and for suicidal thinking.  She reports his been drinking heavily for the past 1 to 2 years  Because of her drinking she is not able to function she is keeping well she is having a lot of withdrawal.  She had nausea shaking vomiting.  The patient is also been worse and she has been cutting herself and she is having suicidal ideation.  Patient has been using the following substances: Alcohol  Started at age17 , became a problem at early 20's     Patient has tolerance, withdrawal, progressive use, loss of control, spending more time and more amount than intended. Patient has made attempts to quit, is experiencing cravings, and reports negative consequences.         Patient has increasing depression she reports she is  Crying a lot   Reports: depressed mood,+suicidal ideation no  Plan no intent  , decreased interest, changes in sleep, changes in appetite, guilt, hopelessness, helplessness,wothless  impaired concentration, decreased energy, irritability.            Patient does not have a history of seizures.  Patient does not have a history of delirium tremens.        Use pot occaionally            Denies thoughts of suicide or harming others.       Denies auditory or visual hallucinations.      Patient does not smokes     Patient denied any gambling     Substance Age first use First became regular or problematic Most recent use   Alcohol         Cannabis         Cocaine NONE       Stimulants NONE        Opioids NONE       Sedatives NONE       Hallucinogens NONE       Inhalants NONE       Other         OTC drugs NONE       Nicotine            Patient does not have a history of overdose.  Patient does not have a history of IV use.  Patient does not have a history of hepatitis, HIV,           See Admission note by Dr. Jacqui MD on 11/18/21 for additional details.          Diagnoses:     Major depressive disorder moderate recurrent without psychosis  Rule out bipolar affective disorder most recent episode depressed without psychosis  Alcohol use disorder severe  Alcohol withdrawal severe, resolved  Generalized anxiety disorder  PTSD  SIB via cutting  Suicidal ideation, resolved          Labs:     Recent Results (from the past 168 hour(s))   Drug abuse screen 1 urine (ED)    Collection Time: 11/17/21 12:37 PM   Result Value Ref Range    Amphetamines Urine Screen Negative Screen Negative    Barbiturates Urine Screen Negative Screen Negative    Benzodiazepines Urine Screen Negative Screen Negative    Cannabinoids Urine Screen Negative Screen Negative    Cocaine Urine Screen Negative Screen Negative    Opiates Urine Screen Negative Screen Negative   HCG qualitative urine (UPT)    Collection Time: 11/17/21 12:37 PM   Result Value Ref Range    hCG Urine Qualitative Negative Negative   UA with Microscopic reflex to Culture    Collection Time: 11/17/21 12:37 PM    Specimen: Urine, Midstream   Result Value Ref Range    Color Urine Orange (A) Colorless, Straw, Light Yellow, Yellow    Appearance Urine Cloudy (A) Clear    Glucose Urine Negative Negative mg/dL    Bilirubin Urine Negative Negative    Ketones Urine Negative Negative mg/dL    Specific Gravity Urine 1.025 1.003 - 1.035    Blood Urine Negative Negative    pH Urine 6.0 5.0 - 7.0    Protein Albumin Urine 50  (A) Negative mg/dL    Urobilinogen Urine Normal Normal, 2.0 mg/dL    Nitrite Urine Negative Negative    Leukocyte Esterase Urine Moderate (A) Negative     Bacteria Urine Moderate (A) None Seen /HPF    Mucus Urine Present (A) None Seen /LPF    RBC Urine 0 <=2 /HPF    WBC Urine 38 (H) <=5 /HPF    Squamous Epithelials Urine 31 (H) <=1 /HPF   Urine Culture    Collection Time: 11/17/21 12:37 PM    Specimen: Urine, Midstream   Result Value Ref Range    Culture 10,000-50,000 CFU/mL Mixture of urogenital golden    Comprehensive metabolic panel    Collection Time: 11/17/21  1:33 PM   Result Value Ref Range    Sodium 137 133 - 144 mmol/L    Potassium 3.9 3.4 - 5.3 mmol/L    Chloride 106 94 - 109 mmol/L    Carbon Dioxide (CO2) 24 20 - 32 mmol/L    Anion Gap 7 3 - 14 mmol/L    Urea Nitrogen 13 7 - 30 mg/dL    Creatinine 0.64 0.52 - 1.04 mg/dL    Calcium 9.2 8.5 - 10.1 mg/dL    Glucose 90 70 - 99 mg/dL    Alkaline Phosphatase 94 40 - 150 U/L    AST 29 0 - 45 U/L    ALT 42 0 - 50 U/L    Protein Total 8.1 6.8 - 8.8 g/dL    Albumin 4.3 3.4 - 5.0 g/dL    Bilirubin Total 0.9 0.2 - 1.3 mg/dL    GFR Estimate >90 >60 mL/min/1.73m2   Ethyl Alcohol Level    Collection Time: 11/17/21  1:33 PM   Result Value Ref Range    Alcohol ethyl <0.01 <=0.01 g/dL   CBC with platelets and differential    Collection Time: 11/17/21  1:33 PM   Result Value Ref Range    WBC Count 13.2 (H) 4.0 - 11.0 10e3/uL    RBC Count 4.93 3.80 - 5.20 10e6/uL    Hemoglobin 15.8 (H) 11.7 - 15.7 g/dL    Hematocrit 45.1 35.0 - 47.0 %    MCV 92 78 - 100 fL    MCH 32.0 26.5 - 33.0 pg    MCHC 35.0 31.5 - 36.5 g/dL    RDW 12.9 10.0 - 15.0 %    Platelet Count 281 150 - 450 10e3/uL    % Neutrophils 83 %    % Lymphocytes 10 %    % Monocytes 5 %    % Eosinophils 1 %    % Basophils 0 %    % Immature Granulocytes 1 %    NRBCs per 100 WBC 0 <1 /100    Absolute Neutrophils 11.1 (H) 1.6 - 8.3 10e3/uL    Absolute Lymphocytes 1.3 0.8 - 5.3 10e3/uL    Absolute Monocytes 0.7 0.0 - 1.3 10e3/uL    Absolute Eosinophils 0.1 0.0 - 0.7 10e3/uL    Absolute Basophils 0.1 0.0 - 0.2 10e3/uL    Absolute Immature Granulocytes 0.1 (H) <=0.0 10e3/uL     Absolute NRBCs 0.0 10e3/uL   Asymptomatic COVID-19 Virus (Coronavirus) by PCR Oropharynx    Collection Time: 11/17/21  1:34 PM    Specimen: Oropharynx; Swab   Result Value Ref Range    SARS CoV2 PCR Negative Negative   TSH with free T4 reflex and/or T3 as indicated    Collection Time: 11/18/21  7:30 AM   Result Value Ref Range    TSH 2.05 0.40 - 4.00 mU/L   Lipid panel    Collection Time: 11/18/21  7:30 AM   Result Value Ref Range    Cholesterol 213 (H) <200 mg/dL    Triglycerides 204 (H) <150 mg/dL    Direct Measure HDL 51 >=50 mg/dL    LDL Cholesterol Calculated 121 (H) <=100 mg/dL    Non HDL Cholesterol 162 (H) <130 mg/dL   Vitamin B12    Collection Time: 11/18/21  7:30 AM   Result Value Ref Range    Vitamin B12 559 193 - 986 pg/mL   Folate    Collection Time: 11/18/21  7:30 AM   Result Value Ref Range    Folic Acid 19.8 >=5.4 ng/mL   GGT    Collection Time: 11/18/21  7:30 AM   Result Value Ref Range    GGT 35 0 - 40 U/L   CBC with platelets and differential    Collection Time: 11/18/21  7:30 AM   Result Value Ref Range    WBC Count 8.1 4.0 - 11.0 10e3/uL    RBC Count 4.63 3.80 - 5.20 10e6/uL    Hemoglobin 14.7 11.7 - 15.7 g/dL    Hematocrit 43.1 35.0 - 47.0 %    MCV 93 78 - 100 fL    MCH 31.7 26.5 - 33.0 pg    MCHC 34.1 31.5 - 36.5 g/dL    RDW 13.0 10.0 - 15.0 %    Platelet Count 241 150 - 450 10e3/uL    % Neutrophils 68 %    % Lymphocytes 22 %    % Monocytes 8 %    % Eosinophils 2 %    % Basophils 0 %    % Immature Granulocytes 0 %    NRBCs per 100 WBC 0 <1 /100    Absolute Neutrophils 5.5 1.6 - 8.3 10e3/uL    Absolute Lymphocytes 1.8 0.8 - 5.3 10e3/uL    Absolute Monocytes 0.7 0.0 - 1.3 10e3/uL    Absolute Eosinophils 0.2 0.0 - 0.7 10e3/uL    Absolute Basophils 0.0 0.0 - 0.2 10e3/uL    Absolute Immature Granulocytes 0.0 <=0.0 10e3/uL    Absolute NRBCs 0.0 10e3/uL   Magnesium    Collection Time: 11/18/21  7:30 AM   Result Value Ref Range    Magnesium 2.2 1.6 - 2.3 mg/dL   Asymptomatic COVID-19 Virus  (Coronavirus) by PCR Nasopharyngeal    Collection Time: 11/18/21  8:05 PM    Specimen: Nasopharyngeal; Swab   Result Value Ref Range    SARS CoV2 PCR Negative Negative   CBC with platelets    Collection Time: 11/19/21  6:49 AM   Result Value Ref Range    WBC Count 6.9 4.0 - 11.0 10e3/uL    RBC Count 4.09 3.80 - 5.20 10e6/uL    Hemoglobin 13.2 11.7 - 15.7 g/dL    Hematocrit 38.0 35.0 - 47.0 %    MCV 93 78 - 100 fL    MCH 32.3 26.5 - 33.0 pg    MCHC 34.7 31.5 - 36.5 g/dL    RDW 12.9 10.0 - 15.0 %    Platelet Count 200 150 - 450 10e3/uL          Consults:   Consultation during this admission received from internal medicine on 11/18/21:    Tessa Barajas is a 31 year old year old woman with a history of alcohol use disorder, depression, anxiety, who was admitted to [facility] seeking detoxification from alcohol.     Alcohol withdrawal   Alcohol use disorder  Drinking about 1-2 liters weekly. Last drink yesterday. No history of withdrawal seizures or DTs. MSSA 11 this shift. LFTs wnl.    - Continue on MSSA protocol with benzodiazepines as indicated   - Management per Psychiatry   - Agree with MVI, folic acid, and thiamine supplements   - Notify medicine for SBP >180 or DBP >110     Sinus tachycardia: Most likely secondary to alcohol withdrawal.    - Notify Medicine if HR sustained >130 or for any chest pain, palpitations, dyspnea     Leukocytosis: WBC 13.2. No s/s of active infectious process. Most likely represents stress response in setting of withdrawal and/or hemoconcentration (hgb is slightly elevated as well).    - Repeat CBC in AM     Medicine will follow repeat labs peripherally. Please do not hesitate to contact if new questions or concerns arise.      Jane Serrano PA-C         Hospital Course:   Patient was admitted to [facility] with attending Cathleen Haskins MD as a voluntary patient. The patient was placed under status 15 (15 minute checks) to ensure patient safety.     MSSA protocol was initiated  due to the patient's history of alcohol abuse and concern for withdrawal symptoms.  Over the course of hospitalization, patient was treated with Valium to manage withdrawal. Last dose of Valium was given on 11/18/21. She completed detox on 11/19/21. She required a TOTAL of 65mg of Valium since admission. She has not experienced any significant symptoms of withdrawal since that time. She experienced no complications associated with withdrawal. Hydroxyzine and Trazodone were utilized prn for management of anxiety and sleep, respectively. Patient was treated with multivitamin, thiamine, and folic acid daily. She was evaluated by IM (see above). She was medically cleared at time of discharge. Anti-craving medications were discussed, and naltrexone was initiated after R/B/A were reviewed with patient. She tolerated medications well without reported side effects.     With regards to her mental health, Dr. Osman indiciated the following on 11/18/21: She reports that she can go a couple of days without sleeping she feels very happy she feels more things that she can do like cleaning sprees she has mood swings she gets up and blood night and talks she is distractible and impulsive racing thoughts take somewhat and then she can do. She is willing to try Depakote will add Depakote 250 mg twice a day patient was instructed not to be compliant on it. She will continue Zoloft 50 mg to target her PTSD and anxiety disorder    Treatment plan includes: Discharge to home with plan to pursue OP CD program at [facility]. Writer completed disability/FMLA forms for patient.       Per CTC note dated 11/22/21- Writer met with patient on 11/22/21 for a follow up to determine if she was able to receive a start date and time with [facility] for treatment. Patient reported that she was expecting a phone call from [facility] today, and would reach out to her  Lorenzo ([phone]) if she did not hear from them. Writer will follow up with  patient later on to determine if patient was able to make contact with [facility] and find out a start time and date.    Patient denied alcohol cravings at time of discharge. She understands risks associated with relapse. Appears to be motivated to maintain sobriety upon discharge.      She did participate in groups and was visible in the milieu.     The patient's symptoms of withdrawal fully resolved.     Patient was released to home with plan to pursue IOP. At the time of discharge, patient was determined to not be a danger to himself or others. She consistently denied SI/HI, and remained future oriented. Bright affect at time of discharge. Denied SIB urges and HI.     Because this patient meets criteria for an Alcohol Use Disorder, I performed the following brief intervention on the date of this note:              1) Expressed concern that the patient is drinking at unhealthy levels known to increase their risk of alcohol related problems              2) Gave feedback linking alcohol use and health, including personalized feedback explaining how alcohol use can interact with their medical and/or psychiatric problems, and with prescribed medications.              3) Advised patient to abstain.         Discharge Medications:     Discharge Medication List as of 11/22/2021 11:45 AM      START taking these medications    Details   divalproex sodium extended-release (DEPAKOTE ER) 250 MG 24 hr tablet Take 1 tablet (250 mg) by mouth 2 times daily, Disp-60 tablet, R-1, E-Prescribe      folic acid (FOLVITE) 1 MG tablet Take 1 tablet (1 mg) by mouth daily, Disp-30 tablet, R-1, E-Prescribe      multivitamin, therapeutic (THERA-VIT) TABS tablet Take 1 tablet by mouth daily, No Print Out      naltrexone (DEPADE/REVIA) 50 MG tablet Take 1 tablet (50 mg) by mouth daily, Disp-30 tablet, R-0, E-Prescribe      thiamine (B-1) 100 MG tablet Take 1 tablet (100 mg) by mouth daily, Disp-30 tablet, R-0, E-Prescribe         CONTINUE these  medications which have NOT CHANGED    Details   albuterol (PROAIR HFA/PROVENTIL HFA/VENTOLIN HFA) 108 (90 Base) MCG/ACT inhaler Inhale 2 puffs into the lungs every 6 hours, Disp-8.5 g, R-1, E-PrescribePharmacy may dispense brand covered by insurance (Proair, or proventil or ventolin or generic albuterol inhaler)      sertraline (ZOLOFT) 50 MG tablet Take 1 tablet (50 mg) by mouth daily, Disp-90 tablet, R-1, E-Prescribe                  Psychiatric Examination:   Appearance:  awake, alert and adequately groomed  Attitude:  cooperative  Eye Contact:  good  Mood:  better  Affect:  appropriate and in normal range  Speech:  clear, coherent  Psychomotor Behavior:  no evidence of tardive dyskinesia, dystonia, or tics  Thought Process:  logical, linear and goal oriented  Associations:  no loose associations  Thought Content:  no evidence of suicidal ideation or homicidal ideation and no evidence of psychotic thought  Insight:  good  Judgment:  intact  Oriented to:  time, person, and place  Attention Span and Concentration:  intact  Recent and Remote Memory:  intact  Language: Able to name objects, Able to repeat phrases and Able to read and write  Fund of Knowledge: appropriate  Muscle Strength and Tone: normal  Gait and Station: Normal         Discharge Plan:   Summary: You were admitted to [facility] on 11/17/21 for detoxification from alcohol.  A medical exam was performed that included lab work. You have met with a  and opted to attend outpatient treatment with [facility].  Please take care and make your recovery a daily priority, Tessa!  It was a pleasure working with you and the entire treatment team here wishes you the very best in your recovery!      Main Diagnoses:  Per Dr. Simone Osman MD;  303.90 (F10.20) Alcohol Use Disorder Severe     Recommendation:  OutpatientTreatment     Disposition: Home     Medical Follow-Up:  [facility]   [id number]  [facility]  [address] [facility] [place] [address]  Please follow up with primary MD  within 30 days for post hospitalization checkup.     Treatment Follow-Up:  [facility]  [address] [facility]   [address]  [place] [address]   Lorenzo: [phone].     Major Treatments, Procedures and Findings:  You were treated for alcohol detoxification using [facility] protocol.  You had a chemical dependency assessment with [facility]. You had labs drawn and those results were reviewed with you. Please take a copy of your lab work with you to your next primary care provider appointment.     Symptoms to Report:  If you experience more anxiety, confusion, sleeplessness, deep sadness or thoughts of suicide, notify your treatment team or notify your primary care provider. IF ANY OF THE SYMPTOMS YOU ARE EXPERIENCING ARE A MEDICAL EMERGENCY CALL 911 IMMEDIATELY.      Lifestyle Adjustment: Adjust your lifestyle to get enough sleep, relaxation, exercise and good nutrition. Continue to develop healthy coping skills to decrease stress and promote a sober living environment. Do not use mood altering substances including alcohol, illegal drugs or addictive medications other than what is currently prescribed.      Facts about COVID19 at www.cdc.gov/COVID19 and www.[place].gov/covid19     Keeping hands clean is one of the most important steps we can take to avoid getting sick and spreading germs to others.  Please wash your hands frequently and lather with soap for at least 20 seconds!     Recovery apps for your phone to locate current in person and zoom recovery meetings  [employer] - meeting indira  AA  - meeting indira  Meeting guide - meeting indira  Quick NA meeting - meeting indira  [facility]nez- has various apps     Resources:  *due to covid-19 most AA/NA meetings are being held online*  AA meetings online search for them at: https://aa-intergroup.org (worldwide meeting listings)  AA meetings for [place] area can be found online at: https://a[place].org (click  local online meetings listings)  NA meetings for [place] area can be found online at: https://www.nam[place].org  (click find a meeting)  AA and NA Sponsors are excellent resources for support and you can find one at any support group meeting.   Alcoholics Anonymous (https://aa.org/): for information 24 hours/day  AA Intergroup service office in [place] (http://www.aastpaul.org/) 551.114.1529  AA Intergroup service office in [facility]: [phone]. (http://www.a[place].org/)  Narcotics Anonymous (www.nam[place].org) (889) 754-1982  https://aafairviewriverside.org/meetings  SMART Recovery - self management for addiction recovery:  www.smartrecovery.org  Pathways ~ A Health Crisis Resource & Support Center:  309.303.3687.  https://prescribetoprevent.org/patient-education/videos/  http://www.harmreduction.org  [facility] 340-465-8189  Support Group:  AA/NA and Sponsor/support.  National [place] on Mental Illness (www.[place].akash.org): 723.755.9273 or 092-030-0890.  Alcoholics Anonymous (www.alcoholics-anonymous.org): Check your phone book for your local chapter.  Suicide Awareness Voices of Education (SAVE) (www.save.org): 515-399-ZEFZ (4470)  National Suicide Prevention Line (www.mentalhealthmn.org): 549-893-JDGL (1640)  Mental Health Consumer/Survivor Network of [place] (www.mhcsn.net): 915.716.2454 or 486-010-5764  Mental Health Association of [place] (www.mentalhealth.org): 392.245.3189 or 257-739-1602   Substance Abuse and Mental Health Services (www.samhsa.gov)  [place] Opioid Prevention Coalition: www.opioidcoalition.org     [place] Recovery Connection (MRC)  MRC connects people seeking recovery to resources that help foster and sustain long-term recovery.  Whether you are seeking resources for treatment, transportation, housing, job training, education, health care or other pathways to recovery, MRC is a great place to start.  574.872.8769.  www.[facility] Cent[facility].org     Great Pod casts for  nutrition and wellness  Listen on Apple Podcasts  Dishing Up Nutrition   [employer]   Nutrition       Understand the connection between what you eat and how you feel. Hosted by licensed nutritionists and dietitians from [employer] we share practical, real-life solutions for healthier living through nutrition.      General Medication Instructions:   See your medication sheet(s) for instructions.   Take all medications as prescribed.  Make no changes unless your primary care provider suggests them.      Please Note:  If you have any questions at anytime after you are discharged please call [facility] detox unit 3[facility] at [phone].  [facility], Behavioral Intake [phone]  Medical Records call [phone]  Outpatient Behavioral Intake call [phone]  LP+ Wait List/Bed Availability call [phone]    Please remember to take all of your behavioral discharge planning and lab paperwork to any follow up appointments, it contains your lab results, diagnosis, medication list and discharge recommendations.       THANK YOU FOR CHOOSING [facility]       Attestation:  The patient has been seen and evaluated by me,  Cathleen Haskins MD     > 45 minutes total time that was spent and over 50% of this time was spent in counseling and coordination of care.

## 2021-11-22 NOTE — PROGRESS NOTES
11/22/21 0700   Sleep/Rest/Relaxation   Sleep/Rest/Relaxation (WDL) WDL   Sleep/Rest/Relaxation appears asleep   Night Time # Hours 6.75 hours     No concerns.

## 2021-11-22 NOTE — DISCHARGE INSTRUCTIONS
Behavioral Discharge Planning and Instructions  THANK YOU FOR CHOOSING [facility]  3AW  [phone]    Summary: You were admitted to [facility] on 11/17/21 for detoxification from alcohol.  A medical exam was performed that included lab work. You have met with a  and opted to attend outpatient treatment with [facility].  Please take care and make your recovery a daily priority, Tessa!  It was a pleasure working with you and the entire treatment team here wishes you the very best in your recovery!     Main Diagnoses:  Per Dr. Simone Osman MD;  303.90 (F10.20) Alcohol Use Disorder Severe    Recommendation:  OutpatientTreatment    Disposition: Home    [facility]  [facility]   [id number]  [address] [place] [address]  Please follow up with primary MD  within 30 days for post hospitalization checkup.    Treatment Follow-Up:  [facility]  [address] [facility] [address] [address]  [place] [address]   Lorenzo: [phone].    Major Treatments, Procedures and Findings:  You were treated for alcohol detoxification using [facility].  You had a chemical dependency assessment with [facility]. You had labs drawn and those results were reviewed with you. Please take a copy of your lab work with you to your next primary care provider appointment.    Symptoms to Report:  If you experience more anxiety, confusion, sleeplessness, deep sadness or thoughts of suicide, notify your treatment team or notify your primary care provider. IF ANY OF THE SYMPTOMS YOU ARE EXPERIENCING ARE A MEDICAL EMERGENCY CALL 911 IMMEDIATELY.     Lifestyle Adjustment: Adjust your lifestyle to get enough sleep, relaxation, exercise and good nutrition. Continue to develop healthy coping skills to decrease stress and promote a sober living environment. Do not use mood altering substances including alcohol, illegal drugs or addictive medications other than what is currently  prescribed.     Facts about COVID19 at www.cdc.gov/COVID19 and www.[place].gov/covid19    Keeping hands clean is one of the most important steps we can take to avoid getting sick and spreading germs to others.  Please wash your hands frequently and lather with soap for at least 20 seconds!    Recovery apps for your phone to locate current in person and zoom recovery meetings  P[place] - meeting indira  AA  - meeting indira  Meeting guide - meeting indira  Quick NA meeting - meeting indira  [facility]- has various apps    Resources:  *due to covid-19 most AA/NA meetings are being held online*  AA meetings online search for them at: https://aa-intergroup.org (worldwide meeting listings)  AA meetings for [place] area can be found online at: https://a[place].org (click local online meetings listings)  NA meetings for [place] area can be found online at: https://www.nam[place].org  (click find a meeting)  AA and NA Sponsors are excellent resources for support and you can find one at any support group meeting.   Alcoholics Anonymous (https://aa.org/): for information 24 hours/day  AA Intergroup service office in [place] (http://www.aastpaul.org/) 259.600.5735  AA Intergroup service office in [facility]: 408.758.1866. (http://www.aamin[place].org/)  Narcotics Anonymous (www.nam[place].org) (965) 471-4294  https://aafairviewriverside.org/meetings  SMART Recovery - self management for addiction recovery:  www.smartrecovery.org  Pathways ~ A Health Crisis Resource & Support Center:  636.876.9197.  https://prescribetoprevent.org/patient-education/videos/  http://www.harmreduction.org  [facility] 610-737-3992  Support Group:  AA/NA and Sponsor/support.  National [place] on Mental Illness (www.[place].akash.org): 634.850.4425 or 031-314-7048.  Alcoholics Anonymous (www.alcoholics-anonymous.org): Check your phone book for your local chapter.  Suicide Awareness Voices of Education (SAVE) (www.save.org): 302-511-SAVE  (2386)  National Suicide Prevention Line (www.mentalhealthmn.org): 057-878-ZBRO (6689)  Mental Health Consumer/Survivor Network of [place] (www.mhcsn.net): 828.443.4322 or 530-751-4976  Mental Health Association of [place] (www.mentalhealth.org): 798.905.6318 or 136-565-1902   Substance Abuse and Mental Health Services (www.samhsa.gov)  [place] Opioid Prevention Coalition: www.opioidcoalition.org    [place] Recovery Connection (MRC)  [facility] connects people seeking recovery to resources that help foster and sustain long-term recovery.  Whether you are seeking resources for treatment, transportation, housing, job training, education, health care or other pathways to recovery, [facility] is a great place to start.  504.738.2271.  www.minnesotarecCleanAgents.comy.[employer] Pod casts for nutrition and wellness  Listen on Apple Podcasts  [employer] Nutrition   [employer]   Nutrition       Understand the connection between what you eat and how you feel. Hosted by licensed nutritionists and dietitians from [employer] we share practical, real-life solutions for healthier living through nutrition.     General Medication Instructions:   See your medication sheet(s) for instructions.   Take all medications as prescribed.  Make no changes unless your primary care provider suggests them.     Please Note:  If you have any questions at anytime after you are discharged please call [facility] detox unit 3AW at [phone].  [facility], Behavioral Intake [phone]  Medical Records call [phone]  Outpatient Behavioral Intake call [phone]  LP+ Wait List/Bed Availability call [phone]    Please remember to take all of your behavioral discharge planning and lab paperwork to any follow up appointments, it contains your lab results, diagnosis, medication list and discharge recommendations.      THANK YOU FOR CHOOSING [facility]

## 2021-11-22 NOTE — PLAN OF CARE
Problem: Substance Withdrawal  Goal: Social and Therapeutic (Substance Withdrawal)  Description: Signs and symptoms of listed problems will be absent or manageable.  Outcome: Improving   Pt continues to feel well.  She is eating and drinking, participating in station programming.  Her affect is full range and bright.  She has been communicating with her mother about discharge plans for IOP with [facility].  Pt is anticipating that the details of her treatment and possible discharge will be arranged tomorrow.  Will continue to assist with discharge planning.

## 2021-11-22 NOTE — PROGRESS NOTES
Amir met with patient on 11/22/21 for a follow up to determine if she was able to receive a start date and time with [facility] for treatment. Patient reported that she was expecting a phone call from [facility] today, and would reach out to her  Lorenzo ([phone]) if she did not hear from them.     will follow up with patient later on to determine if patient was able to make contact with [facility] and find out a start time and date.

## 2021-11-22 NOTE — PLAN OF CARE
Pt verbalized complete understanding of all discharge instructions. Pt discharged to home transported by her father.

## 2021-11-23 ENCOUNTER — PATIENT OUTREACH (OUTPATIENT)
Dept: INTERNAL MEDICINE | Facility: CLINIC | Age: 31
End: 2021-11-23
Payer: COMMERCIAL

## 2021-11-23 NOTE — TELEPHONE ENCOUNTER
Date: 11/22/2021  Diagnosis: Major depressive disorder moderate recurrent without psychosis; Rule out bipolar affective disorder most recent episode depressed without psychosis Alcohol use disorder severe; Alcohol withdrawal severe, resolved  Generalized anxiety disorder  PTSD  SIB via cutting  Suicidal ideation, resolved    Is patient active in care coordination? No  Was patient in TCU? No      ED / Discharge Outreach Protocol    Patient Contact    Attempt # 1    Was call answered?  No.  Left message on voicemail with information to call me back.    Miriam GALEAS RN   [facility]

## 2021-12-14 ENCOUNTER — OFFICE VISIT (OUTPATIENT)
Dept: INTERNAL MEDICINE | Facility: CLINIC | Age: 31
End: 2021-12-14
Payer: COMMERCIAL

## 2021-12-14 VITALS
HEART RATE: 114 BPM | BODY MASS INDEX: 31.28 KG/M2 | OXYGEN SATURATION: 100 % | HEIGHT: 67 IN | SYSTOLIC BLOOD PRESSURE: 119 MMHG | TEMPERATURE: 98.4 F | WEIGHT: 199.3 LBS | DIASTOLIC BLOOD PRESSURE: 84 MMHG | RESPIRATION RATE: 20 BRPM

## 2021-12-14 DIAGNOSIS — F41.1 GAD (GENERALIZED ANXIETY DISORDER): ICD-10-CM

## 2021-12-14 DIAGNOSIS — F33.1 MAJOR DEPRESSIVE DISORDER, RECURRENT EPISODE, MODERATE (H): ICD-10-CM

## 2021-12-14 DIAGNOSIS — F10.20 UNCOMPLICATED ALCOHOL DEPENDENCE (H): ICD-10-CM

## 2021-12-14 DIAGNOSIS — F31.81 BIPOLAR 2 DISORDER (H): Primary | ICD-10-CM

## 2021-12-14 PROCEDURE — 99213 OFFICE O/P EST LOW 20 MIN: CPT | Performed by: INTERNAL MEDICINE

## 2021-12-14 RX ORDER — NALTREXONE HYDROCHLORIDE 50 MG/1
50 TABLET, FILM COATED ORAL DAILY
Qty: 30 TABLET | Refills: 2 | Status: SHIPPED | OUTPATIENT
Start: 2021-12-14 | End: 2023-01-19

## 2021-12-14 RX ORDER — DIVALPROEX SODIUM 250 MG/1
250 TABLET, EXTENDED RELEASE ORAL 2 TIMES DAILY
Qty: 180 TABLET | Refills: 0 | Status: SHIPPED | OUTPATIENT
Start: 2021-12-14 | End: 2023-01-19

## 2021-12-14 ASSESSMENT — MIFFLIN-ST. JEOR: SCORE: 1656.65

## 2021-12-14 NOTE — PROGRESS NOTES
Assessment & Plan     Bipolar 2 disorder (H)  Currently stable but needs to establish with a psychiatrist for long term care. Deferred questions about meds in pregnancy to psych  - Adult Mental Health Referral; Future  - divalproex sodium extended-release (DEPAKOTE ER) 250 MG 24 hr tablet; Take 1 tablet (250 mg) by mouth 2 times daily    Uncomplicated alcohol dependence (H)  In treatment, follow up psych after  - Adult Mental Health Referral; Future  - naltrexone (DEPADE/REVIA) 50 MG tablet; Take 1 tablet (50 mg) by mouth daily    Major depressive disorder, recurrent episode, moderate (H)  Refer   - Adult Mental Health Referral; Future  - sertraline (ZOLOFT) 50 MG tablet; Take 1 tablet (50 mg) by mouth daily    MARVIN (generalized anxiety disorder)  Refer   - Adult Mental Health Referral; Future  - sertraline (ZOLOFT) 50 MG tablet; Take 1 tablet (50 mg) by mouth daily        Return in about 6 months (around 6/1/2022) for Physical Exam.    Orquidea Woods MD  [facility] VANDA Zarate is a 31 year old who presents for the following health issues     HPI       Hospital Follow-up Visit:    Hospital/Nursing Home/IP Rehab Facility: [facility]  Date of Admission: 11/17/2021  Date of Discharge: 11/22/2021  Reason(s) for Admission: Bipolar 2 disorder, alcohol abuse      Was your hospitalization related to COVID-19? No   Problems taking medications regularly:  None  Medication changes since discharge: none  Problems adhering to non-medication therapy:  None    Summary of hospitalization:  [facility] hospital discharge summary reviewed  Diagnostic Tests/Treatments reviewed.  Follow up needed: none  Other Healthcare Providers Involved in Patient s Care:         None  Update since discharge: improved.       She reports she is in outpatient treatment at [facility], in 3rd of 5 weeks.   She was diagnosed with Bipolar II disorder, started on  "medication. She was not given referral to a psychiatrist. She is tolerating well. She has some questions about what may be used in pregnancy, considering in future but not right now.       Post Discharge Medication Reconciliation: discharge medications reconciled, continue medications without change.  Plan of care communicated with patient              Patient Active Problem List   Diagnosis     Headache disorder     Hyperlipidemia LDL goal <160     Panic disorder without agoraphobia     Major depressive disorder, recurrent episode, moderate (H)     ADHD (attention deficit hyperactivity disorder), inattentive type     Intermittent asthma without complication, unspecified asthma severity     Moderate dysplasia of cervix (RANDELL II)     Anxiety     MARVIN (generalized anxiety disorder)     Self-injurious behavior     Alcohol dependence with uncomplicated intoxication (H)     Bipolar 2 disorder (H)     Current Outpatient Medications   Medication Sig Dispense Refill     albuterol (PROAIR HFA/PROVENTIL HFA/VENTOLIN HFA) 108 (90 Base) MCG/ACT inhaler Inhale 2 puffs into the lungs every 6 hours (Patient taking differently: Inhale 2 puffs into the lungs every 6 hours as needed for shortness of breath / dyspnea or wheezing ) 8.5 g 1     divalproex sodium extended-release (DEPAKOTE ER) 250 MG 24 hr tablet Take 1 tablet (250 mg) by mouth 2 times daily 180 tablet 0     multivitamin, therapeutic (THERA-VIT) TABS tablet Take 1 tablet by mouth daily       naltrexone (DEPADE/REVIA) 50 MG tablet Take 1 tablet (50 mg) by mouth daily 30 tablet 2     sertraline (ZOLOFT) 50 MG tablet Take 1 tablet (50 mg) by mouth daily 90 tablet 1        Review of Systems   negative      Objective    /84 (BP Location: Right arm, Patient Position: Sitting, Cuff Size: Adult Large)   Pulse 114   Temp 98.4  F (36.9  C) (Oral)   Resp 20   Ht 1.702 m (5' 7\")   Wt 90.4 kg (199 lb 4.8 oz)   LMP 11/20/2021   SpO2 100%   BMI 31.21 kg/m    Body mass index " is 31.21 kg/m .  Physical Exam       Not examined

## 2021-12-14 NOTE — NURSING NOTE
"/84 (BP Location: Right arm, Patient Position: Sitting, Cuff Size: Adult Large)   Pulse 114   Temp 98.4  F (36.9  C) (Oral)   Resp 20   Ht 1.702 m (5' 7\")   Wt 90.4 kg (199 lb 4.8 oz)   LMP 11/20/2021   SpO2 100%   BMI 31.21 kg/m      "

## 2021-12-19 PROBLEM — T14.8XXA SUPERFICIAL LACERATION: Status: RESOLVED | Noted: 2021-11-17 | Resolved: 2021-12-19

## 2021-12-19 PROBLEM — Z79.899 CONTROLLED SUBSTANCE AGREEMENT SIGNED: Status: RESOLVED | Noted: 2018-01-22 | Resolved: 2021-12-19

## 2021-12-19 PROBLEM — F31.81 BIPOLAR 2 DISORDER (H): Status: ACTIVE | Noted: 2021-12-19

## 2021-12-19 PROBLEM — F33.1 MODERATE EPISODE OF RECURRENT MAJOR DEPRESSIVE DISORDER (H): Status: RESOLVED | Noted: 2021-11-17 | Resolved: 2021-12-19

## 2021-12-20 ENCOUNTER — TELEPHONE (OUTPATIENT)
Dept: INTERNAL MEDICINE | Facility: CLINIC | Age: 31
End: 2021-12-20
Payer: COMMERCIAL

## 2021-12-20 NOTE — TELEPHONE ENCOUNTER
Patient calls, she had appointment last week with Dr. Woods and wanted to confirm she sent prescriptions in to the pharmacy for her. Advised patient that Dr. Woods did send refills for Depakote ER, naltrexone and sertraline, but put a note on all of them not to fill them now and add them to her profile. Recommended patient call the pharmacy and ask them to fill the new orders on file prior to going to the pharmacy. Patient verbalizes understanding.     Neva Cunha RN  [facility]

## 2022-02-01 ENCOUNTER — VIRTUAL VISIT (OUTPATIENT)
Dept: BEHAVIORAL HEALTH | Facility: CLINIC | Age: 32
End: 2022-02-01
Payer: COMMERCIAL

## 2022-02-01 ENCOUNTER — VIRTUAL VISIT (OUTPATIENT)
Dept: PSYCHIATRY | Facility: CLINIC | Age: 32
End: 2022-02-01
Payer: COMMERCIAL

## 2022-02-01 VITALS — BODY MASS INDEX: 29.76 KG/M2 | WEIGHT: 190 LBS

## 2022-02-01 DIAGNOSIS — F10.21 ALCOHOL DEPENDENCE IN REMISSION (H): ICD-10-CM

## 2022-02-01 DIAGNOSIS — F31.81 BIPOLAR 2 DISORDER (H): Primary | ICD-10-CM

## 2022-02-01 DIAGNOSIS — F41.1 GAD (GENERALIZED ANXIETY DISORDER): ICD-10-CM

## 2022-02-01 PROCEDURE — 99205 OFFICE O/P NEW HI 60 MIN: CPT | Mod: GT | Performed by: PSYCHIATRY & NEUROLOGY

## 2022-02-01 PROCEDURE — 90791 PSYCH DIAGNOSTIC EVALUATION: CPT | Mod: 52 | Performed by: SOCIAL WORKER

## 2022-02-01 ASSESSMENT — PATIENT HEALTH QUESTIONNAIRE - PHQ9
10. IF YOU CHECKED OFF ANY PROBLEMS, HOW DIFFICULT HAVE THESE PROBLEMS MADE IT FOR YOU TO DO YOUR WORK, TAKE CARE OF THINGS AT HOME, OR GET ALONG WITH OTHER PEOPLE: SOMEWHAT DIFFICULT
SUM OF ALL RESPONSES TO PHQ QUESTIONS 1-9: 5
10. IF YOU CHECKED OFF ANY PROBLEMS, HOW DIFFICULT HAVE THESE PROBLEMS MADE IT FOR YOU TO DO YOUR WORK, TAKE CARE OF THINGS AT HOME, OR GET ALONG WITH OTHER PEOPLE: SOMEWHAT DIFFICULT
SUM OF ALL RESPONSES TO PHQ QUESTIONS 1-9: 5

## 2022-02-01 NOTE — Clinical Note
Nathalia,    I met with Tessa today.  She had some questions about her medications, which I believe will be addressed.  She understood that she should discontinue naltrexone about 3 months after completing treatment, I will check with addiction medicine about whether or not she should continue on it longer term, and let her know.  If she has questions about being on Depakote if she decides to pursue pregnancy at some point in the future, but denies any problems with at present and is okay with continuing it for now.  I did order appropriate labs.  She seems to be pretty stable right now, so I plan to return her to your care, given that collaborative care psychiatry is meant to be a brief intervention with return to primary care.  If you are not comfortable managing her psychiatric medications, I would recommend that you refer her for long-term care in the community.    Please feel free to contact me with questions or concerns.      Regards,  Humera Krause MD  Collaborative Care Psychiatry
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
## 2022-02-01 NOTE — PATIENT INSTRUCTIONS
Continue Depakote  mg twice daily.    Continue naltrexone 50 mg daily.    Continue sertraline 50 mg daily.    Continue all other medications per your primary care provider.    Per our conversation, you are graduating from the [facility] Care Psychiatry program back to the care of your primary care provider.  Please follow up with them in three to six months.  All future refill requests should go to them.    It has been a pleasure working with you, best wishes for the future!      [facility] Resources:      Go to the Emergency Department as needed or call after hours crisis line at [phone].      To schedule individual or family therapy, call [facility] at [phone].     Follow up with primary care provider as planned or sooner for acute medical concerns.    Call the psychiatric nurse line with medication questions or concerns at [phone].    Varioptichart may be used to communicate with your provider, but this is not intended to be used for emergencies.    Community Resources:      National Suicide Prevention Lifeline: 378.341.6381 (TTY: 889.271.7088). Call anytime for help.  (www.suicidepreventionlifeline.org)    National [place] on Mental Illness (www.akash.org): 152.356.9906 or 147-673-2413.     Mental Health Association (www.mentalhealth.org): 946.557.2596 or 236-458-6845.    [place] Crisis Text Line: Text [place] to 456613    Suicide LifeLine Chat: suicidepreeBillmeline.org/chat

## 2022-02-02 ENCOUNTER — MYC MEDICAL ADVICE (OUTPATIENT)
Dept: PSYCHIATRY | Facility: CLINIC | Age: 32
End: 2022-02-02
Payer: COMMERCIAL

## 2022-02-02 ASSESSMENT — PATIENT HEALTH QUESTIONNAIRE - PHQ9
SUM OF ALL RESPONSES TO PHQ QUESTIONS 1-9: 5
SUM OF ALL RESPONSES TO PHQ QUESTIONS 1-9: 5

## 2022-02-03 ENCOUNTER — MYC MEDICAL ADVICE (OUTPATIENT)
Dept: INTERNAL MEDICINE | Facility: CLINIC | Age: 32
End: 2022-02-03
Payer: COMMERCIAL

## 2022-02-03 NOTE — TELEPHONE ENCOUNTER
Labs have been ordered, she should be able to go to any [facility] lab and have them drawn.  She should have the valproic acid level done before taking her morning dose.    Thanks,  Humera Krause MD  Coll[facility]ative Care Psychiatry  [facility]

## 2022-02-04 NOTE — TELEPHONE ENCOUNTER
Routing Depakote taper instructions request to Dr. Krause.    Ariana Cortez RN on 2/4/2022 at 12:24 PM

## 2022-02-07 NOTE — TELEPHONE ENCOUNTER
See My Chart encounter.  Humera Krause MD  Coll[facility] Care Psychiatry  [facility]

## 2022-03-21 DIAGNOSIS — F31.81 BIPOLAR 2 DISORDER (H): ICD-10-CM

## 2022-03-21 NOTE — LETTER
[place] [address], [address]  [place] [address]  [phone]        March 28, 2022    Tessa Barajas  [address] [address] [address]            Dear Ms. Tessa Barajas:  We received a refill request from your preferred pharmacy for your Depakote 250mg. Per Dr. Norman, your psychiatrist is the one who fills this prescription. Please reach out to your psychiatrist for any refills.  If you have any further questions or problems, please contact our office.    Sincerely,        Beto Norman M.D.

## 2022-03-23 NOTE — TELEPHONE ENCOUNTER
Pending Prescriptions:                       Disp   Refills    divalproex sodium extended-release (DEPAKO*180 ta*0        Sig: Take 1 tablet (250 mg) by mouth 2 times daily    Routing refill request to provider for review/approval because:  Tony GALEAS RN   [facility]

## 2022-03-23 NOTE — TELEPHONE ENCOUNTER
I do not prescribe Depakote, patient has been seeing psychiatrist please advise to call her psychiatrist for refills

## 2022-03-31 RX ORDER — DIVALPROEX SODIUM 250 MG/1
250 TABLET, EXTENDED RELEASE ORAL 2 TIMES DAILY
Qty: 180 TABLET | Refills: 0 | OUTPATIENT
Start: 2022-03-31

## 2022-04-09 DIAGNOSIS — F41.1 GAD (GENERALIZED ANXIETY DISORDER): ICD-10-CM

## 2022-04-09 DIAGNOSIS — F33.1 MAJOR DEPRESSIVE DISORDER, RECURRENT EPISODE, MODERATE (H): ICD-10-CM

## 2022-05-04 ENCOUNTER — PATIENT OUTREACH (OUTPATIENT)
Dept: INTERNAL MEDICINE | Facility: CLINIC | Age: 32
End: 2022-05-04
Payer: COMMERCIAL

## 2022-05-04 DIAGNOSIS — N87.1 MODERATE DYSPLASIA OF CERVIX (CIN II): ICD-10-CM

## 2022-05-17 ENCOUNTER — MYC REFILL (OUTPATIENT)
Dept: INTERNAL MEDICINE | Facility: CLINIC | Age: 32
End: 2022-05-17
Payer: COMMERCIAL

## 2022-05-17 DIAGNOSIS — F31.81 BIPOLAR 2 DISORDER (H): ICD-10-CM

## 2022-05-18 RX ORDER — DIVALPROEX SODIUM 250 MG/1
250 TABLET, EXTENDED RELEASE ORAL 2 TIMES DAILY
Qty: 180 TABLET | Refills: 0 | OUTPATIENT
Start: 2022-05-18

## 2022-05-18 NOTE — TELEPHONE ENCOUNTER
Pending Prescriptions:                       Disp   Refills    divalproex sodium extended-release (DEPAKO*180 ta*0        Sig: Take 1 tablet (250 mg) by mouth 2 times daily    Routing refill request to provider for review/approval because:  See patient's mychart message below

## 2022-05-18 NOTE — TELEPHONE ENCOUNTER
Medication refused, should go to psychiatrist.     Pending Prescriptions:                       Disp   Refills    divalproex sodium extended-release (DEPAK*180 ta*0            Sig: Take 1 tablet (250 mg) by mouth 2 times daily      Myc message sent to patient.

## 2022-05-18 NOTE — TELEPHONE ENCOUNTER
I do not treat bipolar disorder and I do not prescribe Depakote please advise patient to follow-up with a psychiatrist or the provider who prescribed a medication.  Patient has been seeing psychiatrist

## 2022-06-24 NOTE — TELEPHONE ENCOUNTER
FYI to provider - Patient is lost to pap tracking follow-up. Attempts to contact pt have been made per reminder process and there has been no reply and/or no appt scheduled. Contact hx listed below.     8/2010 NIL pap  1/9/12 ASCUS pap, + HR HPV  5/18/12 ASCUS pap, + HR HPV  4/23/13 NIL pap  7/23/15 LSIL pap, + HR HPV (not 16/18)  8/7/15 [facility] Bx: RANDELL 1, ECC: neg  10/5/16 ASCUS pap, neg HR HPV  10/26/17 LSIL pap, + HR HPV (not 16/18)  11/6/17 [facility] Bx: RANDELL 2, ECC: RANDELL 1  5/23/18 NIL pap  [Above per Scanned Records]   5/19/21 NIL pap, neg HR HPV. Plan 1 year cotest  5/4/22 Reminder mychart  6/1/22 Reminder call-LM  6/24/22 Lost to follow up

## 2022-08-07 ENCOUNTER — HEALTH MAINTENANCE LETTER (OUTPATIENT)
Age: 32
End: 2022-08-07

## 2022-09-07 DIAGNOSIS — J45.20 INTERMITTENT ASTHMA WITHOUT COMPLICATION, UNSPECIFIED ASTHMA SEVERITY: ICD-10-CM

## 2022-09-12 RX ORDER — ALBUTEROL SULFATE 90 UG/1
2 AEROSOL, METERED RESPIRATORY (INHALATION) EVERY 6 HOURS
Qty: 8.5 G | Refills: 1 | OUTPATIENT
Start: 2022-09-12

## 2022-10-23 ENCOUNTER — HEALTH MAINTENANCE LETTER (OUTPATIENT)
Age: 32
End: 2022-10-23

## 2023-01-01 NOTE — ED PROVIDER NOTES
ED Provider Note  [facility]      History     Chief Complaint   Patient presents with     Suicidal     Patient presents today due to suicidal thoughts and increased drinking. Patient is seeking detox and assistance with mental health. Patient reports recent cutting last night to left arm and left leg.     HPI  Tessa Barajas is a 31 year old female with hx of MDD, MARVIN, ADHD who presents to the ED stating she needs help to stop drinking.  She says she has been drinking vodka daily for the past 2 years and can't stop on her own. She feels shaky, nauseous, vomits when she tries to stop drinking.  She last drank last night and she said she threw up this morning.  She denies abdominal pain.  She has never been to detox or cd treatment but admits she is an alcoholic.  She says people have been concerned about her for the past 2 years but she wasn't ready to deal with it.  Lately because of her drinking her depression is worsening.  She has hx of cutting and cut last night on her arm and thigh.  She also was having thoughts of wanting to die last night.  She feels is she stays and gets help she won't hurt herself but if she leaves she will try to hurt herself more.  She thinks about cutting and did have some thoughts of overdose but no active plan or intent.  She can keep herself safe if she stays in the hospital.  She is on zoloft for depression.  She drinks about 1-2 L of vodka weekly (closer to 2 L).  She has bouts where she drinks more.  Tdap utd.  Denies hx of withdrawal seizures or withdrawal hallucinations.     Past Medical History  Past Medical History:   Diagnosis Date     Abnormal Pap smear 12/2011     Anxiety      Cervical high risk HPV (human papillomavirus) test positive 01/09/2012 5/18/12, 7/23/15, 10/26/17     Headache disorder      History of colposcopy 08/07/2015 11/6/17     Intermittent asthma without complication, unspecified asthma severity 06/30/2020     Past Surgical  History:   Procedure Laterality Date     ABDOMEN SURGERY  1995    had quarter removed     HC TOOTH EXTRACTION W/FORCEP  2011     albuterol (PROAIR HFA/PROVENTIL HFA/VENTOLIN HFA) 108 (90 Base) MCG/ACT inhaler  sertraline (ZOLOFT) 50 MG tablet      Allergies   Allergen Reactions     Amoxicillin      Rash as a child     Cefzil [Cefprozil]      Rash as a child     Family History  Family History   Problem Relation Age of Onset     Family History Negative Mother      Family History Negative Father         bladder cancer     No Known Problems Brother      No Known Problems Daughter      Social History   Social History     Tobacco Use     Smoking status: Former Smoker     Packs/day: 0.00     Years: 0.00     Pack years: 0.00     Smokeless tobacco: Never Used     Tobacco comment: 2-3 cigs per day   Substance Use Topics     Alcohol use: Yes     Comment: 1-2 liters of vodka per week     Drug use: No      Past medical history, past surgical history, medications, allergies, family history, and social history were reviewed with the patient. No additional pertinent items.       Review of Systems   Psychiatric/Behavioral: Positive for dysphoric mood, self-injury and suicidal ideas. Negative for hallucinations. The patient is nervous/anxious.    All other systems reviewed and are negative.    A complete review of systems was performed with pertinent positives and negatives noted in the HPI, and all other systems negative.    Physical Exam   BP: (!) 127/93  Pulse: 116  Temp: 98.1  F (36.7  C)  Resp: 16  SpO2: 98 %  Physical Exam  Vitals and nursing note reviewed.   Constitutional:       General: She is not in acute distress.     Appearance: She is not ill-appearing, toxic-appearing or diaphoretic.   HENT:      Head: Normocephalic and atraumatic.      Nose: No congestion or rhinorrhea.   Eyes:      General: No scleral icterus.     Extraocular Movements: Extraocular movements intact.   Cardiovascular:      Rate and Rhythm: Tachycardia  present.   Pulmonary:      Effort: Pulmonary effort is normal.   Musculoskeletal:         General: Normal range of motion.      Cervical back: Normal range of motion.   Skin:     Coloration: Skin is not jaundiced.      Comments: Superficial lacerations to left upper arm and left upper thigh.  No bleeding.  Does not require sutures.  No surround redness or discharge.    Neurological:      General: No focal deficit present.      Mental Status: She is alert and oriented to person, place, and time.   Psychiatric:         Attention and Perception: Attention and perception normal.         Mood and Affect: Mood is anxious and depressed.         Speech: Speech normal.         Behavior: Behavior normal. Behavior is cooperative.         Thought Content: Thought content includes suicidal ideation. Thought content does not include suicidal plan.         Cognition and Memory: Cognition and memory normal.         Judgment: Judgment normal.         ED Course      Procedures       The medical record was reviewed and interpreted.  Current labs reviewed and interpreted.       Results for orders placed or performed during the hospital encounter of 11/17/21   Drug abuse screen 1 urine (ED)     Status: Normal   Result Value Ref Range    Amphetamines Urine Screen Negative Screen Negative    Barbiturates Urine Screen Negative Screen Negative    Benzodiazepines Urine Screen Negative Screen Negative    Cannabinoids Urine Screen Negative Screen Negative    Cocaine Urine Screen Negative Screen Negative    Opiates Urine Screen Negative Screen Negative   Comprehensive metabolic panel     Status: Normal   Result Value Ref Range    Sodium 137 133 - 144 mmol/L    Potassium 3.9 3.4 - 5.3 mmol/L    Chloride 106 94 - 109 mmol/L    Carbon Dioxide (CO2) 24 20 - 32 mmol/L    Anion Gap 7 3 - 14 mmol/L    Urea Nitrogen 13 7 - 30 mg/dL    Creatinine 0.64 0.52 - 1.04 mg/dL    Calcium 9.2 8.5 - 10.1 mg/dL    Glucose 90 70 - 99 mg/dL    Alkaline Phosphatase  94 40 - 150 U/L    AST 29 0 - 45 U/L    ALT 42 0 - 50 U/L    Protein Total 8.1 6.8 - 8.8 g/dL    Albumin 4.3 3.4 - 5.0 g/dL    Bilirubin Total 0.9 0.2 - 1.3 mg/dL    GFR Estimate >90 >60 mL/min/1.73m2   Ethyl Alcohol Level     Status: Normal   Result Value Ref Range    Alcohol ethyl <0.01 <=0.01 g/dL   HCG qualitative urine (UPT)     Status: Normal   Result Value Ref Range    hCG Urine Qualitative Negative Negative   Asymptomatic COVID-19 Virus (Coronavirus) by PCR Oropharynx     Status: Normal    Specimen: Oropharynx; Swab   Result Value Ref Range    SARS CoV2 PCR Negative Negative    Narrative    Testing was performed using the uma  SARS-CoV-2 & Influenza A/B Assay on the uma  [employer]  System.  This test should be ordered for the detection of SARS-COV-2 in individuals who meet SARS-CoV-2 clinical and/or epidemiological criteria. Test performance is unknown in asymptomatic patients.  This test is for in vitro diagnostic use under the FDA EUA for laboratories certified under CLIA to perform moderate and/or high complexity testing. This test has not been FDA cleared or approved.  A negative test does not rule out the presence of PCR inhibitors in the specimen or target RNA in concentration below the limit of detection for the assay. The possibility of a false negative should be considered if the patient's recent exposure or clinical presentation suggests COVID-19.  [facility] are certified under the Clinical Laboratory Improvement Amendments of 1988 (CLIA-88) as qualified to perform moderate and/or high complexity laboratory testing.   CBC with platelets and differential     Status: Abnormal   Result Value Ref Range    WBC Count 13.2 (H) 4.0 - 11.0 10e3/uL    RBC Count 4.93 3.80 - 5.20 10e6/uL    Hemoglobin 15.8 (H) 11.7 - 15.7 g/dL    Hematocrit 45.1 35.0 - 47.0 %    MCV 92 78 - 100 fL    MCH 32.0 26.5 - 33.0 pg    MCHC 35.0 31.5 - 36.5 g/dL    RDW 12.9 10.0 - 15.0 %    Platelet Count 281 150 -  450 10e3/uL    % Neutrophils 83 %    % Lymphocytes 10 %    % Monocytes 5 %    % Eosinophils 1 %    % Basophils 0 %    % Immature Granulocytes 1 %    NRBCs per 100 WBC 0 <1 /100    Absolute Neutrophils 11.1 (H) 1.6 - 8.3 10e3/uL    Absolute Lymphocytes 1.3 0.8 - 5.3 10e3/uL    Absolute Monocytes 0.7 0.0 - 1.3 10e3/uL    Absolute Eosinophils 0.1 0.0 - 0.7 10e3/uL    Absolute Basophils 0.1 0.0 - 0.2 10e3/uL    Absolute Immature Granulocytes 0.1 (H) <=0.0 10e3/uL    Absolute NRBCs 0.0 10e3/uL   Urine Drugs of Abuse Screen     Status: Normal    Narrative    The following orders were created for panel order Urine Drugs of Abuse Screen.  Procedure                               Abnormality         Status                     ---------                               -----------         ------                     Drug abuse screen 1 urin...[[id number]]  Normal              Final result                 Please view results for these tests on the individual orders.   CBC with platelets differential     Status: Abnormal    Narrative    The following orders were created for panel order CBC with platelets differential.  Procedure                               Abnormality         Status                     ---------                               -----------         ------                     CBC with platelets and d...[[id number]]  Abnormal            Final result                 Please view results for these tests on the individual orders.     Medications   diazepam (VALIUM) tablet 5-20 mg (5 mg Oral Given 11/17/21 1424)        Assessments & Plan (with Medical Decision Making)   The patient has hx of MDD and presents seeking detox for alcohol abuse.  She says she has drank daily for the past 2 years and finally feels like she needs to quit. She admits she is an alcoholic. She has never been to detox or cd treatment.  She can't stop on her own and feels sick when she tries to stop.  She says her depression is worsening due to the  drinking.  She cut last night and was having suicidal thoughts last night without plan.  She feels if she goes home she won't be able to keep herself safe but feels she can keep herself safe if she stays.  She is medially appropriate for admission to detox. Her bp and heart rate was increasing due to withdrawals.  She was started on the MSSA protocol.   This is a voluntary admit.     I have reviewed the nursing notes. I have reviewed the findings, diagnosis, plan and need for follow up with the patient.    New Prescriptions    No medications on file       Final diagnoses:   Alcohol dependence with uncomplicated intoxication (H)   Moderate episode of recurrent major depressive disorder (H)   Self-injurious behavior   Superficial laceration - left upper arm and left upper thigh   MARVIN (generalized anxiety disorder)       --  Tiffany Magdaleno  [facility] EMERGENCY DEPARTMENT  11/17/2021     Tiffany Magdaleno MD  11/17/21 1445       Tiffany Magdaleno MD  11/17/21 3047     13.38

## 2023-01-19 ENCOUNTER — OFFICE VISIT (OUTPATIENT)
Dept: FAMILY MEDICINE | Facility: CLINIC | Age: 33
End: 2023-01-19
Payer: COMMERCIAL

## 2023-01-19 VITALS
WEIGHT: 206 LBS | TEMPERATURE: 97.5 F | HEART RATE: 96 BPM | HEIGHT: 67 IN | RESPIRATION RATE: 16 BRPM | BODY MASS INDEX: 32.33 KG/M2 | OXYGEN SATURATION: 98 % | DIASTOLIC BLOOD PRESSURE: 88 MMHG | SYSTOLIC BLOOD PRESSURE: 126 MMHG

## 2023-01-19 DIAGNOSIS — Z12.4 CERVICAL CANCER SCREENING: ICD-10-CM

## 2023-01-19 DIAGNOSIS — F10.21 ALCOHOL DEPENDENCE IN REMISSION (H): ICD-10-CM

## 2023-01-19 DIAGNOSIS — Z00.00 ROUTINE GENERAL MEDICAL EXAMINATION AT A HEALTH CARE FACILITY: Primary | ICD-10-CM

## 2023-01-19 DIAGNOSIS — Z11.59 NEED FOR HEPATITIS C SCREENING TEST: ICD-10-CM

## 2023-01-19 DIAGNOSIS — F31.81 BIPOLAR 2 DISORDER (H): ICD-10-CM

## 2023-01-19 DIAGNOSIS — M79.89 LEG SWELLING: ICD-10-CM

## 2023-01-19 LAB
ERYTHROCYTE [DISTWIDTH] IN BLOOD BY AUTOMATED COUNT: 12.6 % (ref 10–15)
HCT VFR BLD AUTO: 39.6 % (ref 35–47)
HGB BLD-MCNC: 13.9 G/DL (ref 11.7–15.7)
MCH RBC QN AUTO: 30.5 PG (ref 26.5–33)
MCHC RBC AUTO-ENTMCNC: 35.1 G/DL (ref 31.5–36.5)
MCV RBC AUTO: 87 FL (ref 78–100)
PLATELET # BLD AUTO: 287 10E3/UL (ref 150–450)
RBC # BLD AUTO: 4.56 10E6/UL (ref 3.8–5.2)
WBC # BLD AUTO: 8.6 10E3/UL (ref 4–11)

## 2023-01-19 PROCEDURE — 84443 ASSAY THYROID STIM HORMONE: CPT | Performed by: NURSE PRACTITIONER

## 2023-01-19 PROCEDURE — 85027 COMPLETE CBC AUTOMATED: CPT | Performed by: NURSE PRACTITIONER

## 2023-01-19 PROCEDURE — 86803 HEPATITIS C AB TEST: CPT | Performed by: NURSE PRACTITIONER

## 2023-01-19 PROCEDURE — 90471 IMMUNIZATION ADMIN: CPT | Performed by: NURSE PRACTITIONER

## 2023-01-19 PROCEDURE — 90715 TDAP VACCINE 7 YRS/> IM: CPT | Performed by: NURSE PRACTITIONER

## 2023-01-19 PROCEDURE — G0145 SCR C/V CYTO,THINLAYER,RESCR: HCPCS | Performed by: NURSE PRACTITIONER

## 2023-01-19 PROCEDURE — 36415 COLL VENOUS BLD VENIPUNCTURE: CPT | Performed by: NURSE PRACTITIONER

## 2023-01-19 PROCEDURE — 87624 HPV HI-RISK TYP POOLED RSLT: CPT | Performed by: NURSE PRACTITIONER

## 2023-01-19 PROCEDURE — 99395 PREV VISIT EST AGE 18-39: CPT | Mod: 25 | Performed by: NURSE PRACTITIONER

## 2023-01-19 PROCEDURE — 80053 COMPREHEN METABOLIC PANEL: CPT | Performed by: NURSE PRACTITIONER

## 2023-01-19 PROCEDURE — 99214 OFFICE O/P EST MOD 30 MIN: CPT | Mod: 25 | Performed by: NURSE PRACTITIONER

## 2023-01-19 ASSESSMENT — ASTHMA QUESTIONNAIRES
QUESTION_1 LAST FOUR WEEKS HOW MUCH OF THE TIME DID YOUR ASTHMA KEEP YOU FROM GETTING AS MUCH DONE AT WORK, SCHOOL OR AT HOME: A LITTLE OF THE TIME
QUESTION_2 LAST FOUR WEEKS HOW OFTEN HAVE YOU HAD SHORTNESS OF BREATH: ONCE OR TWICE A WEEK
QUESTION_3 LAST FOUR WEEKS HOW OFTEN DID YOUR ASTHMA SYMPTOMS (WHEEZING, COUGHING, SHORTNESS OF BREATH, CHEST TIGHTNESS OR PAIN) WAKE YOU UP AT NIGHT OR EARLIER THAN USUAL IN THE MORNING: NOT AT ALL
ACT_TOTALSCORE: 21
QUESTION_4 LAST FOUR WEEKS HOW OFTEN HAVE YOU USED YOUR RESCUE INHALER OR NEBULIZER MEDICATION (SUCH AS ALBUTEROL): ONCE A WEEK OR LESS
ACT_TOTALSCORE: 21
EMERGENCY_ROOM_LAST_YEAR_TOTAL: ONE
QUESTION_5 LAST FOUR WEEKS HOW WOULD YOU RATE YOUR ASTHMA CONTROL: WELL CONTROLLED

## 2023-01-19 ASSESSMENT — ANXIETY QUESTIONNAIRES
3. WORRYING TOO MUCH ABOUT DIFFERENT THINGS: SEVERAL DAYS
2. NOT BEING ABLE TO STOP OR CONTROL WORRYING: NOT AT ALL
6. BECOMING EASILY ANNOYED OR IRRITABLE: SEVERAL DAYS
GAD7 TOTAL SCORE: 3
IF YOU CHECKED OFF ANY PROBLEMS ON THIS QUESTIONNAIRE, HOW DIFFICULT HAVE THESE PROBLEMS MADE IT FOR YOU TO DO YOUR WORK, TAKE CARE OF THINGS AT HOME, OR GET ALONG WITH OTHER PEOPLE: NOT DIFFICULT AT ALL
7. FEELING AFRAID AS IF SOMETHING AWFUL MIGHT HAPPEN: NOT AT ALL
GAD7 TOTAL SCORE: 3
5. BEING SO RESTLESS THAT IT IS HARD TO SIT STILL: NOT AT ALL
1. FEELING NERVOUS, ANXIOUS, OR ON EDGE: SEVERAL DAYS

## 2023-01-19 ASSESSMENT — PATIENT HEALTH QUESTIONNAIRE - PHQ9
10. IF YOU CHECKED OFF ANY PROBLEMS, HOW DIFFICULT HAVE THESE PROBLEMS MADE IT FOR YOU TO DO YOUR WORK, TAKE CARE OF THINGS AT HOME, OR GET ALONG WITH OTHER PEOPLE: SOMEWHAT DIFFICULT
SUM OF ALL RESPONSES TO PHQ QUESTIONS 1-9: 5
5. POOR APPETITE OR OVEREATING: NOT AT ALL
SUM OF ALL RESPONSES TO PHQ QUESTIONS 1-9: 5

## 2023-01-19 NOTE — PROGRESS NOTES
SUBJECTIVE:   CC: Tessa is an 32 year old who presents for preventive health visit.   Patient has been advised of split billing requirements and indicates understanding: Yes     Healthy Habits:     Getting at least 3 servings of Calcium per day:  Yes    Bi-annual eye exam:  Yes    Dental care twice a year:  Yes    Sleep apnea or symptoms of sleep apnea:  Excessive snoring    Diet:  Regular (no restrictions)    Frequency of exercise:  2-3 days/week    Duration of exercise:  30-45 minutes    Taking medications regularly:  Yes    Barriers to taking medications:  None    Medication side effects:  None    PHQ-2 Total Score: 2    Additional concerns today:  Yes (Swollen Ankles)  History of Present Illness       Reason for visit:  Want an overall checkup and have noticed swelling in ankles  Symptom onset:  1-3 days ago  Symptoms include:  Swelling in ankles , tired  Symptom intensity:  Moderate  Symptom progression:  Staying the same  Had these symptoms before:  No  What makes it worse:  No  What makes it better:  No    She eats 2-3 servings of fruits and vegetables daily.She consumes 1 sweetened beverage(s) daily.She exercises with enough effort to increase her heart rate 20 to 29 minutes per day.  She exercises with enough effort to increase her heart rate 3 or less days per week.   She is not taking prescribed medications regularly due to None.    Today's PHQ-9         PHQ-9 Total Score: 5    PHQ-9 Q9 Thoughts of better off dead/self-harm past 2 weeks :   Not at all    How difficult have these problems made it for you to do your work, take care of things at home, or get along with other people: Somewhat difficult      Ankle Problem - Bilateral swelling x couple days  Noted this a couple days ago when she was taking off her socks.    Was worried about this and wanted to have a general check up.       Recent Labs   Lab Test 11/18/21  0730 05/19/21  0928   CHOL 213* 192   HDL 51 51   * 118*   TRIG 204* 114          BP Readings from Last 2 Encounters:   01/19/23 126/88   12/14/21 119/84     LDL Cholesterol Calculated (mg/dL)   Date Value   11/18/2021 121 (H)   05/19/2021 118 (H)     Depression and Anxiety Follow-Up    How are you doing with your depression since your last visit? Improved     How are you doing with your anxiety since your last visit?  Improved     Are you having other symptoms that might be associated with depression or anxiety? No    Have you had a significant life event? No     Do you have any concerns with your use of alcohol or other drugs? No    Last seen by psychiatry provider in February 2022.    Treatment in November/December 2021.   Stopped Sertraline 3 months ago.   Stopped Naltrexone and Depakote last year.    Feels like she is doing ok with better nutrition and exercise.      Used to drink daily, now is currently drinking once weekly.    Is working out multiple times per week.        Social History     Tobacco Use     Smoking status: Former     Packs/day: 0.00     Years: 0.00     Pack years: 0.00     Types: Cigarettes     Smokeless tobacco: Never   Vaping Use     Vaping Use: Never used   Substance Use Topics     Alcohol use: Not Currently     Drug use: Not Currently     PHQ 2/1/2022 2/1/2022 1/19/2023   PHQ-9 Total Score 5 5 5   Q9: Thoughts of better off dead/self-harm past 2 weeks Not at all Not at all Not at all   Some encounter information is confidential and restricted. Go to Review Flowsheets activity to see all data.     MARVIN-7 SCORE 6/7/2021 2/1/2022 1/19/2023   Total Score - - -   Total Score - 4 (minimal anxiety) -   Total Score 0 - 3   Some encounter information is confidential and restricted. Go to Review Flowsheets activity to see all data.     Last PHQ-9 1/19/2023   1.  Little interest or pleasure in doing things 1   2.  Feeling down, depressed, or hopeless 1   3.  Trouble falling or staying asleep, or sleeping too much 1   4.  Feeling tired or having little energy 1   5.  Poor  appetite or overeating 1   6.  Feeling bad about yourself 0   7.  Trouble concentrating 0   8.  Moving slowly or restless 0   Q9: Thoughts of better off dead/self-harm past 2 weeks 0   PHQ-9 Total Score 5   Difficulty at work, home, or with people -   Some encounter information is confidential and restricted. Go to Review Flowsheets activity to see all data.     MARVIN-7  1/19/2023   1. Feeling nervous, anxious, or on edge 1   2. Not being able to stop or control worrying 0   3. Worrying too much about different things 1   4. Trouble relaxing 0   5. Being so restless that it is hard to sit still 0   6. Becoming easily annoyed or irritable 1   7. Feeling afraid, as if something awful might happen 0   MARVIN-7 Total Score 3   If you checked any problems, how difficult have they made it for you to do your work, take care of things at home, or get along with other people? Not difficult at all   Some encounter information is confidential and restricted. Go to Review Switchcam activity to see all data.     Asthma Follow-Up    Was ACT completed today?    Yes    ACT Total Scores 1/19/2023   ACT TOTAL SCORE (Goal Greater than or Equal to 20) 21   In the past 12 months, how many times did you visit the emergency room for your asthma without being admitted to the hospital? 1   In the past 12 months, how many times were you hospitalized overnight because of your asthma? 0          How many days per week do you miss taking your asthma controller medication?  I do not have an asthma controller medication    Please describe any recent triggers for your asthma: None    Have you had any Emergency Room Visits, Urgent Care Visits, or Hospital Admissions since your last office visit?  No      Social History     Tobacco Use     Smoking status: Former     Packs/day: 0.00     Years: 0.00     Pack years: 0.00     Types: Cigarettes     Smokeless tobacco: Never   Substance Use Topics     Alcohol use: Not Currently     If you drink alcohol do you  typically have >3 drinks per day or >7 drinks per week? No    Alcohol Use 1/19/2023   Prescreen: >3 drinks/day or >7 drinks/week? No     Reviewed orders with patient.  Reviewed health maintenance and updated orders accordingly - Yes  BP Readings from Last 3 Encounters:   01/19/23 126/88   12/14/21 119/84   05/19/21 126/82    Wt Readings from Last 3 Encounters:   01/19/23 93.4 kg (206 lb)   02/01/22 86.2 kg (190 lb)   12/14/21 90.4 kg (199 lb 4.8 oz)                  Patient Active Problem List   Diagnosis     Headache disorder     Hyperlipidemia LDL goal <160     Panic disorder without agoraphobia     Major depressive disorder, recurrent episode, moderate (H)     ADHD (attention deficit hyperactivity disorder), inattentive type     Intermittent asthma without complication, unspecified asthma severity     Moderate dysplasia of cervix (RANDELL II)     Anxiety     MARVIN (generalized anxiety disorder)     Self-injurious behavior     Alcohol dependence with uncomplicated intoxication (H)     Bipolar 2 disorder (H)     Alcohol dependence in remission (H)     Past Surgical History:   Procedure Laterality Date     ABDOMEN SURGERY  1995    had quarter removed     HC TOOTH EXTRACTION W/FORCEP  2011       Social History     Tobacco Use     Smoking status: Former     Packs/day: 0.00     Years: 0.00     Pack years: 0.00     Types: Cigarettes     Smokeless tobacco: Never   Substance Use Topics     Alcohol use: Not Currently     Family History   Problem Relation Age of Onset     Family History Negative Mother      Family History Negative Father         bladder cancer     No Known Problems Brother      No Known Problems Daughter          Current Outpatient Medications   Medication Sig Dispense Refill     albuterol (PROAIR HFA/PROVENTIL HFA/VENTOLIN HFA) 108 (90 Base) MCG/ACT inhaler Inhale 2 puffs into the lungs every 6 hours as needed for shortness of breath / dyspnea or wheezing 8.5 g 0     multivitamin, therapeutic (THERA-VIT) TABS  tablet Take 1 tablet by mouth daily       divalproex sodium extended-release (DEPAKOTE ER) 250 MG 24 hr tablet Take 1 tablet (250 mg) by mouth 2 times daily (Patient not taking: Reported on 1/19/2023) 180 tablet 0     naltrexone (DEPADE/REVIA) 50 MG tablet Take 1 tablet (50 mg) by mouth daily (Patient not taking: Reported on 1/19/2023) 30 tablet 2     sertraline (ZOLOFT) 50 MG tablet Take 1 tablet (50 mg) by mouth daily (Patient not taking: Reported on 1/19/2023) 90 tablet 1       Breast Cancer Screening:  Any new diagnosis of family breast, ovarian, or bowel cancer? No    FHS-7: No flowsheet data found.    Patient under 40 years of age: Routine Mammogram Screening not recommended.   Pertinent mammograms are reviewed under the imaging tab.    History of abnormal Pap smear: YES - updated in Problem List and Health Maintenance accordingly  PAP / HPV Latest Ref Rng & Units 5/19/2021 4/23/2013   PAP (Historical) - NIL NIL   HPV16 NEG:Negative Negative -   HPV18 NEG:Negative Negative -   HRHPV NEG:Negative Negative -     Reviewed and updated as needed this visit by clinical staff   Tobacco  Allergies  Meds            Reviewed and updated as needed this visit by Provider                 Past Medical History:   Diagnosis Date     Abnormal Pap smear 12/2011     Anxiety      Cervical high risk HPV (human papillomavirus) test positive 01/09/2012 5/18/12, 7/23/15, 10/26/17     Headache disorder      History of colposcopy 08/07/2015 11/6/17     Intermittent asthma without complication, unspecified asthma severity 06/30/2020      Past Surgical History:   Procedure Laterality Date     ABDOMEN SURGERY  1995    had quarter removed     HC TOOTH EXTRACTION W/FORCEP  2011       Review of Systems   ROS: 10 point ROS neg other than the symptoms noted above in the HPI.       OBJECTIVE:   /88 (BP Location: Right arm, Patient Position: Sitting, Cuff Size: Adult Regular)   Pulse 96   Temp 97.5  F (36.4  C) (Tympanic)    "Resp 16   Ht 1.689 m (5' 6.5\")   Wt 93.4 kg (206 lb)   LMP 01/12/2023 (Within Days)   SpO2 98%   BMI 32.75 kg/m    Physical Exam     GENERAL: healthy, alert and no distress  EYES: Eyes grossly normal to inspection, PERRL and conjunctivae and sclerae normal  HENT: ear canals and TM's normal, nose and mouth without ulcers or lesions  NECK: no adenopathy, no asymmetry, masses, or scars and thyroid normal to palpation  RESP: lungs clear to auscultation - no rales, rhonchi or wheezes  BREAST: normal without masses, tenderness or nipple discharge and no palpable axillary masses or adenopathy  CV: regular rate and rhythm, normal S1 S2, no S3 or S4, no murmur, click or rub, peripheral pulses strong, minimal dependent edema, non-pitting  ABDOMEN: soft, nontender, no hepatosplenomegaly, no masses and bowel sounds normal   (female): normal female external genitalia, normal urethral meatus, vaginal mucosa pink, moist, well rugated, and normal cervix/adnexa/uterus without masses or discharge  MS: no gross musculoskeletal defects noted, no edema  SKIN: no suspicious lesions or rashes  NEURO: Normal strength and tone, mentation intact and speech normal  PSYCH: mentation appears normal, affect normal/bright      ASSESSMENT/PLAN:     Tessa was seen today for physical and ankle problem.    Diagnoses and all orders for this visit:    Routine general medical examination at a health care facility    Need for hepatitis C screening test  -     Hepatitis C Screen Reflex to HCV RNA Quant and Genotype    Cervical cancer screening  -     Pap Screen with HPV - recommended age 30 - 65 years    Leg swelling  Mild dependent edema.   Recommend compression stockings, increase in fluid hydration.   Labs as below for further evaluation of metabolic vs. Endocrine systems.    -     Comprehensive metabolic panel (BMP + Alb, Alk Phos, ALT, AST, Total. Bili, TP)  -     TSH with free T4 reflex  -     CBC with platelets    Alcohol dependence in " remission (H)  Bipolar 2 disorder (H)  Stable mood, discontinued Depakote, Sertraline and Naltrexone.   Partial remission of alcohol use - recommend fully abstaining from alcohol.    Continue to closely monitor mood and alcohol use.      Other orders  -     REVIEW OF HEALTH MAINTENANCE PROTOCOL ORDERS  -     TDAP VACCINE (Adacel, Boostrix)        COUNSELING:  Reviewed preventive health counseling, as reflected in patient instructions      She reports that she has quit smoking. Her smoking use included cigarettes. She has never used smokeless tobacco.    KO Henderson CNP  [facility] [place]

## 2023-01-20 LAB
ALBUMIN SERPL BCG-MCNC: 4.8 G/DL (ref 3.5–5.2)
ALP SERPL-CCNC: 80 U/L (ref 35–104)
ALT SERPL W P-5'-P-CCNC: 25 U/L (ref 10–35)
ANION GAP SERPL CALCULATED.3IONS-SCNC: 17 MMOL/L (ref 7–15)
AST SERPL W P-5'-P-CCNC: 27 U/L (ref 10–35)
BILIRUB SERPL-MCNC: 0.4 MG/DL
BUN SERPL-MCNC: 10.2 MG/DL (ref 6–20)
CALCIUM SERPL-MCNC: 9.9 MG/DL (ref 8.6–10)
CHLORIDE SERPL-SCNC: 101 MMOL/L (ref 98–107)
CREAT SERPL-MCNC: 0.68 MG/DL (ref 0.51–0.95)
DEPRECATED HCO3 PLAS-SCNC: 21 MMOL/L (ref 22–29)
GFR SERPL CREATININE-BSD FRML MDRD: >90 ML/MIN/1.73M2
GLUCOSE SERPL-MCNC: 85 MG/DL (ref 70–99)
POTASSIUM SERPL-SCNC: 3.9 MMOL/L (ref 3.4–5.3)
PROT SERPL-MCNC: 7.3 G/DL (ref 6.4–8.3)
SODIUM SERPL-SCNC: 139 MMOL/L (ref 136–145)
TSH SERPL DL<=0.005 MIU/L-ACNC: 1.95 UIU/ML (ref 0.3–4.2)

## 2023-01-20 NOTE — RESULT ENCOUNTER NOTE
Dear Tessa,     -Normal red blood cell (hgb) levels, normal white blood cell count and normal platelet levels.      Please send a [employer] message or call [phone]  if you have any questions.      KO Henderson, [facility]    If you have further questions about the interpretation of your labs, [email or web address] is a good website to check out for further information.

## 2023-01-22 LAB — HCV AB SERPL QL IA: NONREACTIVE

## 2023-01-24 LAB
BKR LAB AP GYN ADEQUACY: NORMAL
BKR LAB AP GYN INTERPRETATION: NORMAL
BKR LAB AP HPV REFLEX: NORMAL
BKR LAB AP PREVIOUS ABNORMAL: NORMAL
PATH REPORT.COMMENTS IMP SPEC: NORMAL
PATH REPORT.COMMENTS IMP SPEC: NORMAL
PATH REPORT.RELEVANT HX SPEC: NORMAL

## 2023-01-25 NOTE — RESULT ENCOUNTER NOTE
Williams Zarate,     -Liver and gallbladder tests are normal (ALT,AST, Alk phos, bilirubin), kidney function is normal (Cr, GFR), sodium is normal, potassium is normal, calcium is normal, glucose is normal.  -TSH (thyroid stimulating hormone) level is normal which indicates normal thyroid function.  -Hepatitis C antibody screen test shows no signs of a previous hepatitis C infection.      Please send a Kryptiq message or call [phone]  if you have any questions.      Cheryl Perez, APRN, CNP  [facility]    If you have further questions about the interpretation of your labs, [email or web address] is a good website to check out for further information.

## 2023-01-26 ENCOUNTER — PATIENT OUTREACH (OUTPATIENT)
Dept: FAMILY MEDICINE | Facility: CLINIC | Age: 33
End: 2023-01-26
Payer: COMMERCIAL

## 2023-01-26 ENCOUNTER — MYC MEDICAL ADVICE (OUTPATIENT)
Dept: FAMILY MEDICINE | Facility: CLINIC | Age: 33
End: 2023-01-26
Payer: COMMERCIAL

## 2023-01-26 DIAGNOSIS — N87.1 MODERATE DYSPLASIA OF CERVIX (CIN II): ICD-10-CM

## 2023-01-26 LAB
HUMAN PAPILLOMA VIRUS 16 DNA: NEGATIVE
HUMAN PAPILLOMA VIRUS 18 DNA: NEGATIVE
HUMAN PAPILLOMA VIRUS FINAL DIAGNOSIS: ABNORMAL
HUMAN PAPILLOMA VIRUS OTHER HR: POSITIVE

## 2023-01-26 NOTE — TELEPHONE ENCOUNTER
I called and spoke with the patient, discussed NIL, +HR HPV, not 16/18 and recommendation for Colposcopy due to her history of RANDELL 2 on [place].  She is a patient of [facility] and will contact them to set up Colposcopy.  She is in agreement with pap team sending reminders/calls as needed.

## 2023-07-28 NOTE — TELEPHONE ENCOUNTER
FYI to provider - Patient is lost to pap tracking follow-up. Attempts to contact pt have been made per reminder process and there has been no reply and/or no appt scheduled. Contact hx listed below.     8/2010 NIL pap  1/9/12 ASCUS pap, + HR HPV  5/18/12 ASCUS pap, + HR HPV  4/23/13 NIL pap  7/23/15 LSIL pap, + HR HPV (not 16/18)  8/7/15 [facility] Bx: RANDELL 1, ECC: neg  10/5/16 ASCUS pap, neg HR HPV  10/26/17 LSIL pap, + HR HPV (not 16/18)  11/6/17 [facility] Bx: RANDELL 2, ECC: ARNDELL 1. TCA x 4 done rather than LEEP to treat per patient preference.  5/23/18 NIL pap  [Above per Scanned Records]   5/19/21 NIL pap, neg HR HPV. Plan 1 year cotest  5/4/22 Reminder mychart  6/1/22 Reminder call-LM  6/24/22 Lost to follow up  1/19/23 NIL, +HR HPV, not 16/18. Plan [facility] bef 4/19/23 1/26/23 Pt notified, plans to schedule Colposcopy with [facility].  03/16/23 Reminder Pierret (2mo)  04/14/23 [facility] not done. Tracking updated for 6 mo colp/pap due 07/19/23 6/30/23 Reminder Reece / my chart read by patient  7/28/23 Lost to follow-up for pap tracking

## 2023-08-21 NOTE — TELEPHONE ENCOUNTER
Fill at [facility].    Pt has not seen Dr. Nino since 9/15/2016.   Left voice message for pt that she needs to schedule an appt in the next month with Dr. Nino.     Adderall XR 15 mg     Last Written Prescription Date:  1/23/18  Last Fill Quantity: 30,   # refills: 0  Last Office Visit: 10/25/17 (Dr. Howard)  Future Office visit:       Signed CSA on file.     RX monitoring [place] ([place]) reviewed:  not reviewed/not due - last done on 12/22/17    [place]:  [email or web address]     Routing refill request to provider for review/approval because:  Drug not on the [facility], UMP or  Health refill protocol or controlled substance    Dressing: pressure dressing with telfa

## 2023-11-22 ENCOUNTER — APPOINTMENT (OUTPATIENT)
Dept: GENERAL RADIOLOGY | Facility: CLINIC | Age: 33
End: 2023-11-22
Attending: EMERGENCY MEDICINE
Payer: COMMERCIAL

## 2023-11-22 ENCOUNTER — HOSPITAL ENCOUNTER (EMERGENCY)
Facility: CLINIC | Age: 33
Discharge: HOME | End: 2023-11-22
Attending: EMERGENCY MEDICINE | Admitting: EMERGENCY MEDICINE
Payer: COMMERCIAL

## 2023-11-22 VITALS
RESPIRATION RATE: 22 BRPM | WEIGHT: 190 LBS | OXYGEN SATURATION: 97 % | TEMPERATURE: 98.7 F | DIASTOLIC BLOOD PRESSURE: 78 MMHG | HEIGHT: 67 IN | SYSTOLIC BLOOD PRESSURE: 112 MMHG | HEART RATE: 89 BPM | BODY MASS INDEX: 29.82 KG/M2

## 2023-11-22 DIAGNOSIS — M54.6 ACUTE LEFT-SIDED THORACIC BACK PAIN: ICD-10-CM

## 2023-11-22 DIAGNOSIS — R07.9 CHEST PAIN, UNSPECIFIED TYPE: Primary | ICD-10-CM

## 2023-11-22 LAB
ALBUMIN SERPL BCG-MCNC: 4.5 G/DL (ref 3.5–5.2)
ALP SERPL-CCNC: 73 U/L (ref 40–150)
ALT SERPL W P-5'-P-CCNC: 15 U/L (ref 0–50)
ANION GAP SERPL CALCULATED.3IONS-SCNC: 11 MMOL/L (ref 7–15)
AST SERPL W P-5'-P-CCNC: 16 U/L (ref 0–45)
ATRIAL RATE - MUSE: 97 BPM
BASOPHILS # BLD AUTO: 0 10E3/UL (ref 0–0.2)
BASOPHILS NFR BLD AUTO: 1 %
BILIRUB SERPL-MCNC: 0.8 MG/DL
BUN SERPL-MCNC: 13 MG/DL (ref 6–20)
CALCIUM SERPL-MCNC: 9.3 MG/DL (ref 8.6–10)
CHLORIDE SERPL-SCNC: 105 MMOL/L (ref 98–107)
CREAT SERPL-MCNC: 0.78 MG/DL (ref 0.51–0.95)
D DIMER PPP FEU-MCNC: <0.27 UG/ML FEU (ref 0–0.5)
DEPRECATED HCO3 PLAS-SCNC: 24 MMOL/L (ref 22–29)
DIASTOLIC BLOOD PRESSURE - MUSE: NORMAL MMHG
EGFRCR SERPLBLD CKD-EPI 2021: >90 ML/MIN/1.73M2
EOSINOPHIL # BLD AUTO: 0.1 10E3/UL (ref 0–0.7)
EOSINOPHIL NFR BLD AUTO: 2 %
ERYTHROCYTE [DISTWIDTH] IN BLOOD BY AUTOMATED COUNT: 12.2 % (ref 10–15)
GLUCOSE SERPL-MCNC: 93 MG/DL (ref 70–99)
HCG SERPL QL: NEGATIVE
HCT VFR BLD AUTO: 37 % (ref 35–47)
HGB BLD-MCNC: 13.1 G/DL (ref 11.7–15.7)
IMM GRANULOCYTES # BLD: 0 10E3/UL
IMM GRANULOCYTES NFR BLD: 1 %
INTERPRETATION ECG - MUSE: NORMAL
LYMPHOCYTES # BLD AUTO: 1.6 10E3/UL (ref 0.8–5.3)
LYMPHOCYTES NFR BLD AUTO: 24 %
MAGNESIUM SERPL-MCNC: 1.9 MG/DL (ref 1.7–2.3)
MCH RBC QN AUTO: 31.1 PG (ref 26.5–33)
MCHC RBC AUTO-ENTMCNC: 35.4 G/DL (ref 31.5–36.5)
MCV RBC AUTO: 88 FL (ref 78–100)
MONOCYTES # BLD AUTO: 0.5 10E3/UL (ref 0–1.3)
MONOCYTES NFR BLD AUTO: 8 %
NEUTROPHILS # BLD AUTO: 4.3 10E3/UL (ref 1.6–8.3)
NEUTROPHILS NFR BLD AUTO: 64 %
NRBC # BLD AUTO: 0 10E3/UL
NRBC BLD AUTO-RTO: 0 /100
P AXIS - MUSE: 46 DEGREES
PLATELET # BLD AUTO: 239 10E3/UL (ref 150–450)
POTASSIUM SERPL-SCNC: 3.8 MMOL/L (ref 3.4–5.3)
PR INTERVAL - MUSE: 150 MS
PROT SERPL-MCNC: 7 G/DL (ref 6.4–8.3)
QRS DURATION - MUSE: 90 MS
QT - MUSE: 334 MS
QTC - MUSE: 424 MS
R AXIS - MUSE: 50 DEGREES
RBC # BLD AUTO: 4.21 10E6/UL (ref 3.8–5.2)
SODIUM SERPL-SCNC: 140 MMOL/L (ref 135–145)
SYSTOLIC BLOOD PRESSURE - MUSE: NORMAL MMHG
T AXIS - MUSE: 14 DEGREES
TROPONIN T SERPL HS-MCNC: <6 NG/L
TSH SERPL DL<=0.005 MIU/L-ACNC: 1.99 UIU/ML (ref 0.3–4.2)
VENTRICULAR RATE- MUSE: 97 BPM
WBC # BLD AUTO: 6.7 10E3/UL (ref 4–11)

## 2023-11-22 PROCEDURE — 85025 COMPLETE CBC W/AUTO DIFF WBC: CPT | Performed by: EMERGENCY MEDICINE

## 2023-11-22 PROCEDURE — 96374 THER/PROPH/DIAG INJ IV PUSH: CPT

## 2023-11-22 PROCEDURE — 85379 FIBRIN DEGRADATION QUANT: CPT | Performed by: EMERGENCY MEDICINE

## 2023-11-22 PROCEDURE — 83735 ASSAY OF MAGNESIUM: CPT | Performed by: EMERGENCY MEDICINE

## 2023-11-22 PROCEDURE — 93005 ELECTROCARDIOGRAM TRACING: CPT

## 2023-11-22 PROCEDURE — 80053 COMPREHEN METABOLIC PANEL: CPT | Performed by: EMERGENCY MEDICINE

## 2023-11-22 PROCEDURE — 84484 ASSAY OF TROPONIN QUANT: CPT | Performed by: EMERGENCY MEDICINE

## 2023-11-22 PROCEDURE — 71046 X-RAY EXAM CHEST 2 VIEWS: CPT

## 2023-11-22 PROCEDURE — 36415 COLL VENOUS BLD VENIPUNCTURE: CPT | Performed by: EMERGENCY MEDICINE

## 2023-11-22 PROCEDURE — 250N000011 HC RX IP 250 OP 636: Mod: JZ | Performed by: EMERGENCY MEDICINE

## 2023-11-22 PROCEDURE — 84703 CHORIONIC GONADOTROPIN ASSAY: CPT | Performed by: EMERGENCY MEDICINE

## 2023-11-22 PROCEDURE — 84443 ASSAY THYROID STIM HORMONE: CPT | Performed by: EMERGENCY MEDICINE

## 2023-11-22 PROCEDURE — 99285 EMERGENCY DEPT VISIT HI MDM: CPT | Mod: 25

## 2023-11-22 RX ORDER — CYCLOBENZAPRINE HCL 10 MG
10 TABLET ORAL 3 TIMES DAILY PRN
Qty: 20 TABLET | Refills: 0 | Status: SHIPPED | OUTPATIENT
Start: 2023-11-22 | End: 2023-11-29

## 2023-11-22 RX ORDER — KETOROLAC TROMETHAMINE 15 MG/ML
15 INJECTION, SOLUTION INTRAMUSCULAR; INTRAVENOUS ONCE
Status: COMPLETED | OUTPATIENT
Start: 2023-11-22 | End: 2023-11-22

## 2023-11-22 RX ADMIN — KETOROLAC TROMETHAMINE 15 MG: 15 INJECTION, SOLUTION INTRAMUSCULAR; INTRAVENOUS at 07:11

## 2023-11-22 ASSESSMENT — ACTIVITIES OF DAILY LIVING (ADL): ADLS_ACUITY_SCORE: 37

## 2023-11-22 NOTE — ED PROVIDER NOTES
History   Chief Complaint:  Chest Pain (Since 0300)     KENDALL Barajas is a 33 year old female with no pertinent past medical history who presents with left-sided thoracic chest wall discomfort.  Patient notes she was up for work around 4 AM when preparing for work when she developed left-sided chest discomfort and pleuritic chest pain.  She notes some mild shortness of breath.  Denies any active abdominal pain, nausea, vomiting, or diarrhea.  She denies fever and constitutional symptoms.  Denies any rash.  Notes a family history of cardiac disease but her parents mom and dad have not had heart attacks or coronary artery disease.  Patient herself does not have a history of high blood pressure, high cholesterol or diabetes.  Denies any lower extremity swelling.  Denies history of blood clots.    Independent Historian:   None - Patient Only    Review of External Notes:   Reviewed [facility] visit 1/10/2023 where patient was seen for asthma exacerbation.     Medications:    cyclobenzaprine (FLEXERIL) 10 MG tablet  albuterol (PROAIR HFA/PROVENTIL HFA/VENTOLIN HFA) 108 (90 Base) MCG/ACT inhaler  multivitamin, therapeutic (THERA-VIT) TABS tablet      Past Medical History:    Past Medical History:   Diagnosis Date    Abnormal Pap smear 12/2011    Anxiety     Cervical high risk HPV (human papillomavirus) test positive 01/09/2012    Headache disorder     History of colposcopy 08/07/2015    Intermittent asthma without complication, unspecified asthma severity 06/30/2020     Past Surgical History:    Past Surgical History:   Procedure Laterality Date    ABDOMEN SURGERY  1995    had quarter removed    HC TOOTH EXTRACTION W/FORCEP  2011      Physical Exam   Patient Vitals for the past 24 hrs:   BP Temp Temp src Pulse Resp SpO2 Height Weight   11/22/23 0712 -- -- -- 89 22 -- -- --   11/22/23 0657 112/78 -- -- 92 14 97 % -- --   11/22/23 0631 121/78 -- -- 91 14 100 % -- --   11/22/23 0607 (!) 140/81 98.7  F (37.1  C)  "Temporal 105 18 100 % -- --   11/22/23 0606 (!) 140/81 98.1  F (36.7  C) Temporal 110 16 100 % -- 86.2 kg (190 lb)   11/22/23 0605 -- -- -- -- -- -- 1.702 m (5' 7\") 86.2 kg (190 lb)   General: Alert, appears well-developed and well-nourished. Cooperative.     In mild distress  HEENT:  Head:  Atraumatic  Ears:  External ears are normal  Mouth/Throat:  Oropharynx is without erythema or exudate and mucous membranes are moist.   Eyes:   Conjunctivae normal and EOM are normal. No scleral icterus.  CV:  Tachycardic rate, regular rhythm, normal heart sounds and radial pulses are 2+ and symmetric.  No murmur.  Resp:  Breath sounds are clear bilaterally    Non-labored, no retractions or accessory muscle use  GI:  Abdomen is soft, no distension, no tenderness. No rebound or guarding.  No CVA tenderness bilaterally  MS:  Normal range of motion. No edema.    No chest wall tenderness to palpation.      Normal strength in all 4 extremities.     Back atraumatic.    No midline cervical, thoracic, or lumbar tenderness  Skin:  Warm and dry.  No rash or lesions noted.  No shingles.  Neuro:   Alert. Normal strength.  GCS: 15  Psych: Normal mood and affect.    Emergency Department Course     ECG results from 11/22/23   EKG 12-lead, tracing only     Value    Systolic Blood Pressure     Diastolic Blood Pressure     Ventricular Rate 97    Atrial Rate 97    OR Interval 150    QRS Duration 90        QTc 424    P Axis 46    R AXIS 50    T Axis 14    Interpretation ECG      Sinus rhythm  Possible Left atrial enlargement  Nonspecific T wave abnormality  Abnormal ECG  When compared with ECG of 19-OCT-2020 23:33,  Nonspecific T wave abnormality now evident in Anterior leads       Imaging:  Chest XR,  PA & LAT   Final Result   IMPRESSION:   Heart size and pulmonary vascularity within normal limits. No focal lung infiltrates. Osseous structures grossly intact. Visualized upper abdomen unremarkable.      Report per " radiology    Laboratory:  Labs Ordered and Resulted from Time of ED Arrival to Time of ED Departure   TROPONIN T, HIGH SENSITIVITY - Normal       Result Value    Troponin T, High Sensitivity <6     COMPREHENSIVE METABOLIC PANEL - Normal    Sodium 140      Potassium 3.8      Carbon Dioxide (CO2) 24      Anion Gap 11      Urea Nitrogen 13.0      Creatinine 0.78      GFR Estimate >90      Calcium 9.3      Chloride 105      Glucose 93      Alkaline Phosphatase 73      AST 16      ALT 15      Protein Total 7.0      Albumin 4.5      Bilirubin Total 0.8     D DIMER QUANTITATIVE - Normal    D-Dimer Quantitative <0.27     MAGNESIUM - Normal    Magnesium 1.9     TSH WITH FREE T4 REFLEX - Normal    TSH 1.99     HCG QUALITATIVE PREGNANCY - Normal    hCG Serum Qualitative Negative     CBC WITH PLATELETS AND DIFFERENTIAL    WBC Count 6.7      RBC Count 4.21      Hemoglobin 13.1      Hematocrit 37.0      MCV 88      MCH 31.1      MCHC 35.4      RDW 12.2      Platelet Count 239      % Neutrophils 64      % Lymphocytes 24      % Monocytes 8      % Eosinophils 2      % Basophils 1      % Immature Granulocytes 1      NRBCs per 100 WBC 0      Absolute Neutrophils 4.3      Absolute Lymphocytes 1.6      Absolute Monocytes 0.5      Absolute Eosinophils 0.1      Absolute Basophils 0.0      Absolute Immature Granulocytes 0.0      Absolute NRBCs 0.0       Procedures     Emergency Department Course & Assessments:       Interventions:  Medications   ketorolac (TORADOL) injection 15 mg (15 mg Intravenous $Given 11/22/23 0711)        Independent Interpretation (X-rays, CTs, rhythm strip):  I independently evaluated CXR which shows no pneumothorax or pneumonia.      Consultations/Discussion of Management or Tests:  None        Social Determinants of Health affecting care:   None    Disposition:  The patient was discharged to home.     Impression & Plan    CMS Diagnoses: None    Medical Decision Making:  Tessa Barajas is a 33 year old female who  presents with chest pain and SOB.  The work up in the Emergency Department is negative.  I considered a broad differential diagnosis in this patient including life-threatening etiologies such as acute coronary syndrome, myocardial infarction, pulmonary embolism, acute aortic dissection, myocarditis, pericarditis, acute valvular insufficiency amongst others.  Other causes considered for this patient included pneumonia, pneumothorax, chest wall source, pericarditis, pleurisy, esophageal spasm, etc.  No serious etiology for the chest pain were detected today during this visit.  Consideration that symptoms may be related to intercostal strain or thoracic back muscle spasm.  Could consider Tylenol, ibuprofen, and Flexeril for discomfort symptoms.  Thankfully with undetectable D-dimer, troponin, and nonischemic unchanged EKG in comparison with historic EKGs very low suspicion for sinister intrathoracic etiologies.  Necessitating any further advanced CT imaging today.  Close follow up with primary care is indicated should the pain continue, as further work up may be performed; this was made clear to the patient, who understands.     Diagnosis:    ICD-10-CM    1. Chest pain, unspecified type  R07.9       2. Acute left-sided thoracic back pain  M54.6         Discharge Medications:  New Prescriptions    CYCLOBENZAPRINE (FLEXERIL) 10 MG TABLET    Take 1 tablet (10 mg) by mouth 3 times daily as needed for muscle spasms      11/22/2023   Laith Moy MD White, Scott, MD  11/22/23 5836       Laith Moy MD  11/22/23 6236

## 2023-11-22 NOTE — ED TRIAGE NOTES
Pt c/o of chest pain after she woke up this morning at 0300, the pain radiates to the left flank. Pt c/o of shortness of breath and  pain when she takes deep breaths in. Pt denies any cough, no fever.

## 2023-11-22 NOTE — Clinical Note
Tessa Barajas was seen and treated in our emergency department on 11/22/2023.  She may return to work on 11/24/2023.       If you have any questions or concerns, please don't hesitate to call.      Laith Moy MD

## 2024-01-04 NOTE — MR AVS SNAPSHOT
"              After Visit Summary   7/11/2018    Tessa Barajas    MRN: [id number]           Patient Information     Date Of Birth          1990        Visit Information        Provider Department      7/11/2018 10:00 AM Hemalatha Nino MD [facility]        Today's Diagnoses     Other fatigue    -  1    Major depressive disorder, recurrent episode, moderate (H)        ADHD (attention deficit hyperactivity disorder), inattentive type        Depression with anxiety          Care Instructions    10 lbs weight loss  400 cecille less per day- 1 to 2 lbs per week    Exercise 150 minutes per week           Follow-ups after your visit        Who to contact     If you have questions or need follow up information about today's clinic visit or your schedule please contact [facility] directly at [phone].  Normal or non-critical lab and imaging results will be communicated to you by MyChart, letter or phone within 4 business days after the clinic has received the results. If you do not hear from us within 7 days, please contact the clinic through MyChart or phone. If you have a critical or abnormal lab result, we will notify you by phone as soon as possible.  Submit refill requests through [email or web address] or call your pharmacy and they will forward the refill request to us. Please allow 3 business days for your refill to be completed.          Additional Information About Your Visit        MyCharLightera Information     Iron.[email or web address] lets you send messages to your doctor, view your test results, renew your prescriptions, schedule appointments and more. To sign up, go to [email or web address] . Click on \"Log in\" on the left side of the screen, which will take you to the Welcome page. Then click on \"Sign up Now\" on the right side of the page.     You will be asked to enter the access code listed below, as well as some personal information. Please follow the directions to create your username and " "password.     Your access code is: [email or web address]  Expires: 10/9/2018  9:53 AM     Your access code will  in 90 days. If you need help or a new code, please call your [facility] clinic or [phone].        Care EveryWhere ID     This is your Care EveryWhere ID. This could be used by other organizations to access your [facility] medical records  [id number]        Your Vitals Were     Pulse Temperature Respirations Height Pulse Oximetry BMI (Body Mass Index)    90 98.6  F (37  C) (Oral) 18 5' 7\" (1.702 m) 99% 28.44 kg/m2       Blood Pressure from Last 3 Encounters:   18 110/80   18 112/70   18 118/70    Weight from Last 3 Encounters:   18 181 lb 9.6 oz (82.4 kg)   18 169 lb 9.6 oz (76.9 kg)   18 174 lb 6.4 oz (79.1 kg)              We Performed the Following     CBC with platelets differential     Comprehensive metabolic panel     TSH with free T4 reflex          Where to get your medicines      These medications were sent to [email or web address] [address] AT [facility] [address]  [address], [facility] [address]     Phone:  [phone]     venlafaxine 150 MG 24 hr capsule          Primary Care Provider Office Phone # Fax #    Hemalatha Nino [phone]       [address] [facility] [address]        Equal Access to Services     [facility] Hospital[facility]EVERLY : Hadii aad ku hadasho Soomaali, waaxda luqadaha, qaybta kaalmada adeegericka, lauren monroy. So [facility] [phone].    ATENCIÓN: Si habla español, tiene a irizarry disposición servicios gratuitos de asistencia lingüística. Llame al [phone].    We comply with applicable federal civil rights laws and [place] laws. We do not discriminate on the basis of race, color, national origin, age, disability, sex, sexual orientation, or gender identity.            Thank you!     Thank you for choosing [facility]  for " your care. Our goal is always to provide you with excellent care. Hearing back from our patients is one way we can continue to improve our services. Please take a few minutes to complete the written survey that you may receive in the mail after your visit with us. Thank you!             Your Updated Medication List - Protect others around you: Learn how to safely use, store and throw away your medicines at www.disposemymeds.org.          This list is accurate as of 7/11/18 10:35 AM.  Always use your most recent med list.                   Brand Name Dispense Instructions for use Diagnosis    amphetamine-dextroamphetamine 15 MG per 24 hr capsule    ADDERALL XR    30 capsule    Take 1 capsule (15 mg) by mouth daily    ADHD (attention deficit hyperactivity disorder), inattentive type, Controlled substance agreement signed       MIRENA (52 MG) 20 MCG/24HR IUD   Generic drug:  levonorgestrel      1 each by Intrauterine route once.        venlafaxine 150 MG 24 hr capsule    EFFEXOR-XR    90 capsule    Take 1 capsule (150 mg) by mouth daily    Depression with anxiety          No

## 2024-02-11 ENCOUNTER — OFFICE VISIT (OUTPATIENT)
Dept: URGENT CARE | Facility: URGENT CARE | Age: 34
End: 2024-02-11
Payer: COMMERCIAL

## 2024-02-11 VITALS
OXYGEN SATURATION: 100 % | SYSTOLIC BLOOD PRESSURE: 118 MMHG | DIASTOLIC BLOOD PRESSURE: 72 MMHG | WEIGHT: 185 LBS | HEART RATE: 110 BPM | HEIGHT: 67 IN | TEMPERATURE: 98.3 F | BODY MASS INDEX: 29.03 KG/M2

## 2024-02-11 DIAGNOSIS — R07.0 THROAT PAIN: Primary | ICD-10-CM

## 2024-02-11 DIAGNOSIS — U07.1 INFECTION DUE TO 2019 NOVEL CORONAVIRUS: ICD-10-CM

## 2024-02-11 LAB
DEPRECATED S PYO AG THROAT QL EIA: NEGATIVE
GROUP A STREP BY PCR: NOT DETECTED

## 2024-02-11 PROCEDURE — 99213 OFFICE O/P EST LOW 20 MIN: CPT | Performed by: PHYSICIAN ASSISTANT

## 2024-02-11 PROCEDURE — 87651 STREP A DNA AMP PROBE: CPT | Performed by: PHYSICIAN ASSISTANT

## 2024-02-11 ASSESSMENT — ENCOUNTER SYMPTOMS
FEVER: 0
RHINORRHEA: 1
VOMITING: 0
COUGH: 1
SORE THROAT: 1
ABDOMINAL PAIN: 1

## 2024-02-11 NOTE — PROGRESS NOTES
Assessment & Plan:        ICD-10-CM    1. Throat pain  R07.0 Streptococcus A Rapid Screen w/Reflex to PCR - Clinic Collect     Group A Streptococcus PCR Throat Swab      2. Infection due to 2019 novel coronavirus  U07.1             Plan/Clinical Decision Making:    Patient with recent Covid infection that started on 2/6. Initially symptoms mostly resolved. Has improving URI symptoms with new development of ST.   Erythematous throat. Negative strep.   Rest, fluids, Tylenol, ibuprofen as needed.       Return if symptoms worsen or fail to improve, for in 5-7 days.     At the end of the encounter, I discussed results, diagnosis, medications. Discussed red flags for immediate return to clinic/ER, as well as indications for follow up if no improvement. Patient understood and agreed to plan. Patient was stable for discharge.        Lilia Colunga PA-C on 2/11/2024 at 10:28 AM          Subjective:     HPI:    Tessa is a 33 year old female who presents to clinic today for the following health issues:  Chief Complaint   Patient presents with    Urgent Care     Sore throat and bilat ear pain x 3d     HPI    Fever and body aches and tested positive for covid on Tuesday. She then got better on Wednesday, Thursday. Friday developed ST and pain radiating to ears.     Review of Systems   Constitutional:  Negative for fever.   HENT:  Positive for congestion (mild), rhinorrhea (mild) and sore throat.    Respiratory:  Positive for cough.    Gastrointestinal:  Positive for abdominal pain (no reflux). Negative for vomiting.         Patient Active Problem List   Diagnosis    Headache disorder    Hyperlipidemia LDL goal <160    Panic disorder without agoraphobia    Major depressive disorder, recurrent episode, moderate (H)    ADHD (attention deficit hyperactivity disorder), inattentive type    Intermittent asthma without complication, unspecified asthma severity    Moderate dysplasia of cervix (RANDELL II)    Anxiety    MARVIN (generalized  "anxiety disorder)    Self-injurious behavior    Alcohol dependence with uncomplicated intoxication (H)    Bipolar 2 disorder (H)    Alcohol dependence in remission (H)        Past Medical History:   Diagnosis Date    Abnormal Pap smear 12/2011    Anxiety     Cervical high risk HPV (human papillomavirus) test positive 01/09/2012 5/18/12, 7/23/15, 10/26/17    Headache disorder     History of colposcopy 08/07/2015 11/6/17    Intermittent asthma without complication, unspecified asthma severity 06/30/2020       Social History     Tobacco Use    Smoking status: Former     Packs/day: 0.00     Years: 0.00     Additional pack years: 0.00     Total pack years: 0.00     Types: Cigarettes    Smokeless tobacco: Never   Substance Use Topics    Alcohol use: Not Currently             Objective:     Vitals:    02/11/24 1014   BP: 118/72   Pulse: 110   Temp: 98.3  F (36.8  C)   TempSrc: Tympanic   SpO2: 100%   Weight: 83.9 kg (185 lb)   Height: 1.702 m (5' 7\")         Physical Exam   EXAM:   Pleasant, alert, appropriate appearance. NAD.  Head Exam: Normocephalic, atraumatic.  Eye Exam:  non icteric/injection.    Ear Exam: TMs grey without bulging. Normal canals.  Normal pinna.  Nose Exam: Normal external nose.    OroPharynx Exam:  Moist mucous membranes. positive erythema, pharynx without exudate or hypertrophy.  Neck/Thyroid Exam:  No LAD.    Chest/Respiratory Exam: CTAB.  Cardiovascular Exam: RRR. No murmur or rubs.      Results:  Results for orders placed or performed in visit on 02/11/24   Streptococcus A Rapid Screen w/Reflex to PCR - Clinic Collect     Status: Normal    Specimen: Throat; Swab   Result Value Ref Range    Group A Strep antigen Negative Negative       "

## 2024-02-13 ENCOUNTER — NURSE TRIAGE (OUTPATIENT)
Dept: INTERNAL MEDICINE | Facility: CLINIC | Age: 34
End: 2024-02-13

## 2024-02-13 NOTE — TELEPHONE ENCOUNTER
"S-(situation): Cough x1 week (covid positive), worsening past few days. Productive with green phlegm.    B-(background): Hx of asthma-not currently using her albuterol    A-(assessment): Speaking in full sentences. Sounds congested. Notes intmittent wheezing and SOB with activity only. Denies fever. Mild intermittent chest soreness.     R-(recommendations): Start taking 2 puffs albuterol Q4-6 hours now. Recommended visit today if no improvement. Routing to PCP team for review/to call back for appt.     Janelle Byrnes RN        Reason for Disposition   MILD difficulty breathing (e.g., minimal/no SOB at rest, SOB with walking, pulse <100) and still present when not coughing    Additional Information   Negative: Bluish (or gray) lips or face   Negative: SEVERE difficulty breathing (e.g., struggling for each breath, speaks in single words)   Negative: Rapid onset of cough and has hives   Negative: Coughing started suddenly after medicine, an allergic food or bee sting   Negative: Difficulty breathing after exposure to flames, smoke, or fumes   Negative: Sounds like a life-threatening emergency to the triager   Negative: Previous asthma attacks and this feels like asthma attack   Negative: Dry cough (non-productive; no sputum or minimal clear sputum) and within 14 days of COVID-19 Exposure   Negative: MODERATE difficulty breathing (e.g., speaks in phrases, SOB even at rest, pulse 100-120) and still present when not coughing   Negative: Chest pain present when not coughing   Negative: Passed out (i.e., fainted, collapsed and was not responding)   Negative: Patient sounds very sick or weak to the triager    Answer Assessment - Initial Assessment Questions  1. ONSET: \"When did the cough begin?\"       About a week  2. SEVERITY: \"How bad is the cough today?\"       Worsening the past  3. SPUTUM: \"Describe the color of your sputum\" (none, dry cough; clear, white, yellow, green)      Green  4. HEMOPTYSIS: \"Are you coughing up any " "blood?\" If so ask: \"How much?\" (flecks, streaks, tablespoons, etc.)      No  5. DIFFICULTY BREATHING: \"Are you having difficulty breathing?\" If Yes, ask: \"How bad is it?\" (e.g., mild, moderate, severe)     - MILD: No SOB at rest, mild SOB with walking, speaks normally in sentences, can lie down, no retractions, pulse < 100.     - MODERATE: SOB at rest, SOB with minimal exertion and prefers to sit, cannot lie down flat, speaks in phrases, mild retractions, audible wheezing, pulse 100-120.     - SEVERE: Very SOB at rest, speaks in single words, struggling to breathe, sitting hunched forward, retractions, pulse > 120       Mild- some SOB with stairs only-non at rest  6. FEVER: \"Do you have a fever?\" If Yes, ask: \"What is your temperature, how was it measured, and when did it start?\"      NO fever  7. CARDIAC HISTORY: \"Do you have any history of heart disease?\" (e.g., heart attack, congestive heart failure)       No  8. LUNG HISTORY: \"Do you have any history of lung disease?\"  (e.g., pulmonary embolus, asthma, emphysema)      Asthma  9. PE RISK FACTORS: \"Do you have a history of blood clots?\" (or: recent major surgery, recent prolonged travel, bedridden)      No  10. OTHER SYMPTOMS: \"Do you have any other symptoms?\" (e.g., runny nose, wheezing, chest pain)        Runny nose, congested, wheezing, chest feels a little sore  11. PREGNANCY: \"Is there any chance you are pregnant?\" \"When was your last menstrual period?\"        Late January 12. TRAVEL: \"Have you traveled out of the country in the last month?\" (e.g., travel history, exposures)        No    Protocols used: Cough-A-OH    "

## 2024-02-13 NOTE — TELEPHONE ENCOUNTER
Dr Norman is listed at PCP, however, has not been to [facility] in several years.     Last saw BETH Perez CNP. A year ago.     Will route to both providers.

## 2024-02-13 NOTE — TELEPHONE ENCOUNTER
Agree with RN recommendations for scheduling Albuterol inhaler every 4-6 hours until cough resolves.    Offer patient appointment with no improvement or worsening of symptoms.     - Kathya, CNP

## 2024-02-13 NOTE — TELEPHONE ENCOUNTER
Attempt # 1    Called # [phone]     Left a non detailed VM to call back at [phone] and ask for any available Triage Nurse.    LESLEY BAE RN on 2/13/2024 at 12:27 PM   [employer]

## 2024-02-16 NOTE — TELEPHONE ENCOUNTER
Attempt # 2    Called # [phone]     Left a non detailed VM to call back at [phone] and ask for any available Triage Nurse.    LESLEY BAE RN on 2/16/2024 at 12:33 PM   [facility]

## 2024-02-17 ENCOUNTER — ANCILLARY PROCEDURE (OUTPATIENT)
Dept: GENERAL RADIOLOGY | Facility: CLINIC | Age: 34
End: 2024-02-17
Attending: FAMILY MEDICINE
Payer: COMMERCIAL

## 2024-02-17 ENCOUNTER — OFFICE VISIT (OUTPATIENT)
Dept: URGENT CARE | Facility: URGENT CARE | Age: 34
End: 2024-02-17
Payer: COMMERCIAL

## 2024-02-17 VITALS
RESPIRATION RATE: 20 BRPM | SYSTOLIC BLOOD PRESSURE: 115 MMHG | OXYGEN SATURATION: 98 % | TEMPERATURE: 98.1 F | DIASTOLIC BLOOD PRESSURE: 79 MMHG | HEART RATE: 130 BPM

## 2024-02-17 DIAGNOSIS — R09.89: ICD-10-CM

## 2024-02-17 DIAGNOSIS — R05.9 COUGH, UNSPECIFIED TYPE: ICD-10-CM

## 2024-02-17 DIAGNOSIS — R50.9 FEVER, UNSPECIFIED FEVER CAUSE: Primary | ICD-10-CM

## 2024-02-17 DIAGNOSIS — J18.9 PNEUMONIA OF RIGHT LOWER LOBE DUE TO INFECTIOUS ORGANISM: ICD-10-CM

## 2024-02-17 LAB
FLUAV AG SPEC QL IA: NEGATIVE
FLUBV AG SPEC QL IA: NEGATIVE

## 2024-02-17 PROCEDURE — 87804 INFLUENZA ASSAY W/OPTIC: CPT | Performed by: FAMILY MEDICINE

## 2024-02-17 PROCEDURE — 71046 X-RAY EXAM CHEST 2 VIEWS: CPT | Mod: TC | Performed by: RADIOLOGY

## 2024-02-17 PROCEDURE — 99214 OFFICE O/P EST MOD 30 MIN: CPT | Performed by: FAMILY MEDICINE

## 2024-02-17 RX ORDER — AZITHROMYCIN 250 MG/1
TABLET, FILM COATED ORAL
Qty: 6 TABLET | Refills: 0 | Status: SHIPPED | OUTPATIENT
Start: 2024-02-17 | End: 2024-02-22

## 2024-02-17 ASSESSMENT — PAIN SCALES - GENERAL: PAINLEVEL: MODERATE PAIN (5)

## 2024-02-17 NOTE — PROGRESS NOTES
Chief Complaint   Patient presents with    Cough     Coughing up green phlegm, chest congestion, fever   Tessa was seen today for cough.    Diagnoses and all orders for this visit:    Fever, unspecified fever cause  -     Cancel: Symptomatic COVID-19 Virus (Coronavirus) by PCR Nose  -     Influenza A/B antigen    Cough, unspecified type  -     Cancel: Symptomatic COVID-19 Virus (Coronavirus) by PCR Nose  -     Influenza A/B antigen    Rales 3/4 way up posterior chest wall on right side  -     XR Chest 2 Views  -     azithromycin (ZITHROMAX) 250 MG tablet; Take 2 tablets (500 mg) by mouth daily for 1 day, THEN 1 tablet (250 mg) daily for 4 days.    Pneumonia of right lower lobe due to infectious organism  -     azithromycin (ZITHROMAX) 250 MG tablet; Take 2 tablets (500 mg) by mouth daily for 1 day, THEN 1 tablet (250 mg) daily for 4 days.      D/d:  Acute Bronchitis  Acute Sinusitis  Pneumonia  RSV  Upper Respiratory Infection    PLAN:  See orders in Epic  Symptomatic measures encouraged, humidified air, plenty of fluids.  Xray did show right lower lung opacity   With rales and fever would treat with antibiotic  Was given a note for her work to excuse her for the next 4 days.  Continuing inhaler for asthma symptoms.    Lisandra iVllalobos MD     SUBJECTIVE:  Tessa Barajas is a 33 year old female with h/o asthma and recent covid  who presents to the clinic today with a chief complaint of cough  and chest pain and fever  for 6 day(s).Her cough is described as productive of yellow , green sputum.  The patient's symptoms are moderate and worsening.  Associated symptoms include nasal congestion. The patient's symptoms are exacerbated by no particular triggers  Patient has been using nothing  to improve symptoms.    Past Medical History:   Diagnosis Date    Abnormal Pap smear 12/2011    Anxiety     Cervical high risk HPV (human papillomavirus) test positive 01/09/2012 5/18/12, 7/23/15, 10/26/17    Headache disorder      History of colposcopy 08/07/2015 11/6/17    Intermittent asthma without complication, unspecified asthma severity 06/30/2020       Current Outpatient Medications   Medication Sig Dispense Refill    albuterol (PROAIR HFA/PROVENTIL HFA/VENTOLIN HFA) 108 (90 Base) MCG/ACT inhaler Inhale 2 puffs into the lungs every 6 hours as needed for shortness of breath / dyspnea or wheezing 8.5 g 0    azithromycin (ZITHROMAX) 250 MG tablet Take 2 tablets (500 mg) by mouth daily for 1 day, THEN 1 tablet (250 mg) daily for 4 days. 6 tablet 0    Dextromethorphan-guaiFENesin (ROBITUSSIN COUGH/CONGESTION PO)       multivitamin, therapeutic (THERA-VIT) TABS tablet Take 1 tablet by mouth daily         Social History     Tobacco Use    Smoking status: Former     Packs/day: 0.00     Years: 0.00     Additional pack years: 0.00     Total pack years: 0.00     Types: Cigarettes    Smokeless tobacco: Never   Substance Use Topics    Alcohol use: Not Currently       ROS  Review of systems negative except as stated above.    OBJECTIVE:  /79 (BP Location: Right arm, Patient Position: Sitting, Cuff Size: Adult Regular)   Pulse (!) 130   Temp 98.1  F (36.7  C) (Oral)   Resp 20   LMP 02/17/2024 (Exact Date)   SpO2 98%   GENERAL APPEARANCE: healthy, alert and no distress  EYES: EOMI,  PERRL, conjunctiva clear  HENT: ear canals and TM's normal.  Nose and mouth without ulcers, erythema or lesions  NECK: supple, nontender, no lymphadenopathy  RESP: lungs good air entry positive for rales,in the right posterior chest wall   CV: regular rates and rhythm, normal S1 S2, no murmur noted  PSYCH: mentation appears normal    Lisandra Villalobos MD

## 2024-02-17 NOTE — LETTER
February 17, 2024      Tessa Barajas  [address] [place] [facility] [address]        To Whom It May Concern:    Tessa Barajas  was seen on 2/17/2024 current illness she is to be off from work  Please excuse her  until 2/22/2024 due to illness.        Sincerely,        Lisandra Villalobos MD

## 2024-02-17 NOTE — PATIENT INSTRUCTIONS
Symptomatic measures encouraged, humidified air, plenty of fluids.  Your appetite may be low, so a light diet is fine. Stay well hydrated by drinking 6 to 8 glasses of fluids per day. This includes water, soft drinks, sports drinks, juices, tea, or soup. Extra fluids will help loosen mucus in your nose and lungs.  Over-the-counter cough, cold, and sore-throat medicines will not shorten the length of the illness, but they may be helpful to reduce your symptoms. Finish all antibiotic medicine. Do this even if you are feeling better after only a few days.  Follow up if symptoms worsen  such as sob, high fever and chest pain in 2-3 days     Lisandra Villalobos MD

## 2024-02-20 NOTE — TELEPHONE ENCOUNTER
Attempt # 3rd - encounter closed       Called #   Telephone Information:   Mobile [phone]         Left a non detailed VM     Holley Montelongo RN, BSN  [facility]

## 2024-02-27 ENCOUNTER — MYC MEDICAL ADVICE (OUTPATIENT)
Dept: FAMILY MEDICINE | Facility: CLINIC | Age: 34
End: 2024-02-27

## 2024-02-27 DIAGNOSIS — R06.2 WHEEZING: ICD-10-CM

## 2024-02-27 DIAGNOSIS — R06.02 SOB (SHORTNESS OF BREATH): ICD-10-CM

## 2024-02-28 RX ORDER — FLUTICASONE PROPIONATE 110 UG/1
1 AEROSOL, METERED RESPIRATORY (INHALATION) 2 TIMES DAILY
Qty: 12 G | Refills: 3 | Status: SHIPPED | OUTPATIENT
Start: 2024-02-28 | End: 2024-03-13

## 2024-02-28 NOTE — TELEPHONE ENCOUNTER
Please see message below and advise. Last office visit 1/19/23. Patient states she is no longer seeing Dr. Nino.

## 2024-02-29 ENCOUNTER — TELEPHONE (OUTPATIENT)
Dept: FAMILY MEDICINE | Facility: CLINIC | Age: 34
End: 2024-02-29

## 2024-02-29 NOTE — TELEPHONE ENCOUNTER
Prior Authorization Retail Medication Request    Medication/Dose: Fluticasone Propionate HFA 110MCG/ACT Aerosol   Diagnosis and ICD code (if different than what is on RX):  NA  New/renewal/insurance change PA/secondary ins. PA:NA  Previously Tried and Failed:  NA  Rationale:  NA    Insurance   Primary: MEDICA VANTAGEPLUS FULLY INSURED   Insurance ID:  [id number]       Pharmacy Information (if different than what is on RX)  Name:  [facility]  Phone:  [phone]  Fax:[phone]

## 2024-03-02 ENCOUNTER — NURSE TRIAGE (OUTPATIENT)
Dept: NURSING | Facility: CLINIC | Age: 34
End: 2024-03-02

## 2024-03-02 NOTE — TELEPHONE ENCOUNTER
Patient states around 2/17/2024 went to urgent care and seen by MD Villalobos and was diagnosed with pneumonia.  Patient finished medication and does not feel that she is better.  Two days ago sore throat and congestion.  Denies fever.  Patient states that she will return to urgent care.      Reason for Disposition   SEVERE (e.g., excruciating) throat pain    Additional Information   Negative: SEVERE difficulty breathing (e.g., struggling for each breath, speaks in single words, stridor)   Negative: Sounds like a life-threatening emergency to the triager   Negative: [1] Drooling or spitting out saliva (because can't swallow) AND [2] normal breathing   Negative: Unable to open mouth completely   Negative: [1] Difficulty breathing AND [2] not severe   Negative: Fever > 104 F (40 C)   Negative: [1] Refuses to drink anything AND [2] for > 12 hours   Negative: [1] Drinking very little AND [2] dehydration suspected (e.g., no urine > 12 hours, very dry mouth, very lightheaded)   Negative: Patient sounds very sick or weak to the triager    Protocols used: Sore Throat-A-

## 2024-03-11 ENCOUNTER — TELEPHONE (OUTPATIENT)
Dept: FAMILY MEDICINE | Facility: CLINIC | Age: 34
End: 2024-03-11

## 2024-03-11 DIAGNOSIS — J45.31 MILD PERSISTENT ASTHMA WITH ACUTE EXACERBATION: Primary | ICD-10-CM

## 2024-03-11 NOTE — TELEPHONE ENCOUNTER
Niles from [facility] pharmacy is calling to report they are waiting for an update regarding fluticasone rx. Pharmacy reports rx is not covered under patient's insurance - can start a PA or send a covered alternative rx. Niles reports Qvar, Arnuity, or Asmanex are all covered under patient's insurance. Please advise.     Okay to send an alternative rx, or if wanting patient to be on Fluticasone please call pharmacy to update - waiting on completion of PA

## 2024-03-13 NOTE — TELEPHONE ENCOUNTER
Central Prior Authorization Team   Phone: [phone]    PA Initiation    Medication: Fluticasone Propionate HFA 110MCG/ACT Aerosol   Insurance Company: [employer] - Phone [phone] Fax [phone]  Pharmacy Filling the Rx: [facility] [address] [place] [address] [id number]  Filling Pharmacy Phone: [phone]  Filling Pharmacy Fax:    Start Date: 3/13/2024

## 2024-03-13 NOTE — TELEPHONE ENCOUNTER
Asmanex inhaler sent to patient's pharmacy as an alternative for Flovent.     Please inform patient.     - Kathya, CNP

## 2024-03-15 NOTE — TELEPHONE ENCOUNTER
Left message to call back. [email or web address] message sent. When patient calls back please advise of Cheryl Perez's message below.

## 2024-03-24 ENCOUNTER — HEALTH MAINTENANCE LETTER (OUTPATIENT)
Age: 34
End: 2024-03-24

## 2024-05-21 ENCOUNTER — OFFICE VISIT (OUTPATIENT)
Dept: FAMILY MEDICINE | Facility: CLINIC | Age: 34
End: 2024-05-21
Payer: COMMERCIAL

## 2024-05-21 VITALS
RESPIRATION RATE: 18 BRPM | BODY MASS INDEX: 28.85 KG/M2 | WEIGHT: 183.8 LBS | HEIGHT: 67 IN | TEMPERATURE: 97.8 F | SYSTOLIC BLOOD PRESSURE: 116 MMHG | HEART RATE: 96 BPM | OXYGEN SATURATION: 98 % | DIASTOLIC BLOOD PRESSURE: 82 MMHG

## 2024-05-21 DIAGNOSIS — J45.31 MILD PERSISTENT ASTHMA WITH ACUTE EXACERBATION: Primary | ICD-10-CM

## 2024-05-21 DIAGNOSIS — J30.2 SEASONAL ALLERGIC RHINITIS, UNSPECIFIED TRIGGER: ICD-10-CM

## 2024-05-21 PROCEDURE — 99213 OFFICE O/P EST LOW 20 MIN: CPT | Performed by: PHYSICIAN ASSISTANT

## 2024-05-21 ASSESSMENT — ASTHMA QUESTIONNAIRES
ACT_TOTALSCORE: 13
QUESTION_4 LAST FOUR WEEKS HOW OFTEN HAVE YOU USED YOUR RESCUE INHALER OR NEBULIZER MEDICATION (SUCH AS ALBUTEROL): ONE OR TWO TIMES PER DAY
QUESTION_2 LAST FOUR WEEKS HOW OFTEN HAVE YOU HAD SHORTNESS OF BREATH: ONCE OR TWICE A WEEK
ACT_TOTALSCORE: 13
QUESTION_1 LAST FOUR WEEKS HOW MUCH OF THE TIME DID YOUR ASTHMA KEEP YOU FROM GETTING AS MUCH DONE AT WORK, SCHOOL OR AT HOME: SOME OF THE TIME
QUESTION_5 LAST FOUR WEEKS HOW WOULD YOU RATE YOUR ASTHMA CONTROL: POORLY CONTROLLED
QUESTION_3 LAST FOUR WEEKS HOW OFTEN DID YOUR ASTHMA SYMPTOMS (WHEEZING, COUGHING, SHORTNESS OF BREATH, CHEST TIGHTNESS OR PAIN) WAKE YOU UP AT NIGHT OR EARLIER THAN USUAL IN THE MORNING: TWO OR THREE NIGHTS A WEEK

## 2024-05-21 ASSESSMENT — ANXIETY QUESTIONNAIRES
GAD7 TOTAL SCORE: 1
2. NOT BEING ABLE TO STOP OR CONTROL WORRYING: NOT AT ALL
5. BEING SO RESTLESS THAT IT IS HARD TO SIT STILL: NOT AT ALL
7. FEELING AFRAID AS IF SOMETHING AWFUL MIGHT HAPPEN: NOT AT ALL
GAD7 TOTAL SCORE: 1
6. BECOMING EASILY ANNOYED OR IRRITABLE: SEVERAL DAYS
1. FEELING NERVOUS, ANXIOUS, OR ON EDGE: NOT AT ALL
7. FEELING AFRAID AS IF SOMETHING AWFUL MIGHT HAPPEN: NOT AT ALL
8. IF YOU CHECKED OFF ANY PROBLEMS, HOW DIFFICULT HAVE THESE MADE IT FOR YOU TO DO YOUR WORK, TAKE CARE OF THINGS AT HOME, OR GET ALONG WITH OTHER PEOPLE?: NOT DIFFICULT AT ALL
3. WORRYING TOO MUCH ABOUT DIFFERENT THINGS: NOT AT ALL
GAD7 TOTAL SCORE: 1
4. TROUBLE RELAXING: NOT AT ALL
IF YOU CHECKED OFF ANY PROBLEMS ON THIS QUESTIONNAIRE, HOW DIFFICULT HAVE THESE PROBLEMS MADE IT FOR YOU TO DO YOUR WORK, TAKE CARE OF THINGS AT HOME, OR GET ALONG WITH OTHER PEOPLE: NOT DIFFICULT AT ALL

## 2024-05-21 ASSESSMENT — PATIENT HEALTH QUESTIONNAIRE - PHQ9
SUM OF ALL RESPONSES TO PHQ QUESTIONS 1-9: 2
SUM OF ALL RESPONSES TO PHQ QUESTIONS 1-9: 2
10. IF YOU CHECKED OFF ANY PROBLEMS, HOW DIFFICULT HAVE THESE PROBLEMS MADE IT FOR YOU TO DO YOUR WORK, TAKE CARE OF THINGS AT HOME, OR GET ALONG WITH OTHER PEOPLE: NOT DIFFICULT AT ALL

## 2024-05-21 NOTE — PROGRESS NOTES
"  Assessment & Plan   Advised to start asmanex twice daily and albuterol prn.  Also advised zyrtec daily and neti pot before bed.  Please follow-up if symptoms fail to resolve or worsen      Mild persistent asthma with acute exacerbation          (J30.2) Seasonal allergic rhinitis, unspecified trigger  Comment:   Plan:         BMI  Estimated body mass index is 28.79 kg/m  as calculated from the following:    Height as of this encounter: 1.702 m (5' 7\").    Weight as of this encounter: 83.4 kg (183 lb 12.8 oz).             Vipin Zarate is a 33 year old, presenting for the following health issues:  Asthma        5/21/2024     1:38 PM   Additional Questions   Roomed by Eusebia       Started with significant cough and feels wheezy at night.  No other symptoms. Has allergies and not on anything for that.  Has been using albuterol .has asmanex at home but not using this.       URI           Asthma Follow-Up    Was ACT completed today?  Yes        5/21/2024     1:40 PM   ACT Total Scores   ACT TOTAL SCORE (Goal Greater than or Equal to 20) 13   In the past 12 months, how many times did you visit the emergency room for your asthma without being admitted to the hospital? 0   In the past 12 months, how many times were you hospitalized overnight because of your asthma? 0       How many days per week do you miss taking your asthma controller medication?  I do not have an asthma controller medication  Please describe any recent triggers for your asthma: Patient is unaware of triggers          Review of Systems  CONSTITUTIONAL: NEGATIVE for fever, chills, change in weight  INTEGUMENTARY/SKIN: NEGATIVE for worrisome rashes, moles or lesions  EYES: NEGATIVE for vision changes or irritation  ENT/MOUTH: NEGATIVE for ear, mouth and throat problems  RESP: NEGATIVE for significant cough or SOB  BREAST: NEGATIVE for masses, tenderness or discharge  CV: NEGATIVE for chest pain, palpitations or peripheral edema  GI: NEGATIVE for nausea, " "abdominal pain, heartburn, or change in bowel habits  : NEGATIVE for frequency, dysuria, or hematuria  MUSCULOSKELETAL: NEGATIVE for significant arthralgias or myalgia  NEURO: NEGATIVE for weakness, dizziness or paresthesias  ENDOCRINE: NEGATIVE for temperature intolerance, skin/hair changes  HEME: NEGATIVE for bleeding problems  PSYCHIATRIC: NEGATIVE for changes in mood or affect      Objective    /82   Pulse 96   Temp 97.8  F (36.6  C) (Tympanic)   Resp 18   Ht 1.702 m (5' 7\")   Wt 83.4 kg (183 lb 12.8 oz)   LMP  (LMP Unknown)   SpO2 98%   BMI 28.79 kg/m    Body mass index is 28.79 kg/m .  Physical Exam   GENERAL: alert and no distress  HENT: ear canals and TM's normal, nose and mouth without ulcers or lesions  NECK: no adenopathy, no asymmetry, masses, or scars  RESP: lungs clear to auscultation - no rales, rhonchi or wheezes  CV: regular rate and rhythm, normal S1 S2, no S3 or S4, no murmur, click or rub, no peripheral edema             Signed Electronically by: Ramona Ann Aaseby-Aguilera, PA-C    Answers submitted by the patient for this visit:  Patient Health Questionnaire (Submitted on 5/21/2024)  If you checked off any problems, how difficult have these problems made it for you to do your work, take care of things at home, or get along with other people?: Not difficult at all  PHQ9 TOTAL SCORE: 2  MARVIN-7 (Submitted on 5/21/2024)  MARVIN 7 TOTAL SCORE: 1  Asthma Visit Questionnaire (Submitted on 5/21/2024)  Chief Complaint: Chronic problems general questions HPI Form  Do you have a cough?: No  Are you experiencing any wheezing in your chest?: Yes  Do you have any shortness of breath?: Yes  Use of rescue inhaler:: daily  Taking Asthma medication as prescribed:: I do not have an asthma controller medication  Asthma triggers:: unaware of any triggers, animal dander, occupational exposure, strong odors and fumes  Have you had any Emergency Room visits, Urgent Care visits, or Hospital Admissions since " your last office visit?: No  General Questionnaire (Submitted on 5/21/2024)  Chief Complaint: Chronic problems general questions HPI Form  How many servings of fruits and vegetables do you eat daily?: 2-3  On average, how many sweetened beverages do you drink each day (Examples: soda, juice, sweet tea, etc.  Do NOT count diet or artificially sweetened beverages)?: 1  How many minutes a day do you exercise enough to make your heart beat faster?: 60 or more  How many days a week do you exercise enough to make your heart beat faster?: 4  How many days per week do you miss taking your medication?: 0

## 2024-05-22 RX ORDER — ALBUTEROL SULFATE 90 UG/1
2 AEROSOL, METERED RESPIRATORY (INHALATION) EVERY 6 HOURS PRN
Qty: 8.5 G | Refills: 0 | Status: SHIPPED | OUTPATIENT
Start: 2024-05-22

## 2024-11-21 ENCOUNTER — VIRTUAL VISIT (OUTPATIENT)
Dept: INTERNAL MEDICINE | Facility: CLINIC | Age: 34
End: 2024-11-21
Payer: COMMERCIAL

## 2024-11-21 DIAGNOSIS — J45.20 INTERMITTENT ASTHMA WITHOUT COMPLICATION, UNSPECIFIED ASTHMA SEVERITY: ICD-10-CM

## 2024-11-21 DIAGNOSIS — J20.8 ACUTE BRONCHITIS DUE TO OTHER SPECIFIED ORGANISMS: Primary | ICD-10-CM

## 2024-11-21 RX ORDER — BENZONATATE 100 MG/1
100 CAPSULE ORAL 3 TIMES DAILY PRN
Qty: 21 CAPSULE | Refills: 0 | Status: SHIPPED | OUTPATIENT
Start: 2024-11-21

## 2024-11-21 ASSESSMENT — ENCOUNTER SYMPTOMS: COUGH: 1

## 2024-11-21 NOTE — PROGRESS NOTES
"Tessa is a 34 year old who is being evaluated via a billable video visit.    How would you like to obtain your AVS? MyChart  If the video visit is dropped, the invitation should be resent by: Text to cell phone: [phone]  Will anyone else be joining your video visit? No      Assessment & Plan     Acute bronchitis due to other specified organisms  Symptoms present for around 3 days.  Encouraged to continue with symptomatic management.  Tessalon Perles to be used as needed for cough.  Follow-up sooner precautions discussed with the patient.  - benzonatate (TESSALON) 100 MG capsule; Take 1 capsule (100 mg) by mouth 3 times daily as needed for cough.    Intermittent asthma without complication, unspecified asthma severity  Encouraged to resume mometasone inhaler daily and use of albuterol inhaler as needed every 6 hours for shortness of breath/wheezing/cough.          BMI  Estimated body mass index is 28.79 kg/m  as calculated from the following:    Height as of 5/21/24: 1.702 m (5' 7\").    Weight as of 5/21/24: 83.4 kg (183 lb 12.8 oz).             Subjective   Tessa is a 34 year old, presenting for the following health issues:  Cough (35 weeks pregnant, cough more than 3 weeks with yellowish mucus )        11/21/2024     3:31 PM   Additional Questions   Roomed by Yeti   Accompanied by Self     Cough       Cough symptoms for like 3 days and worse at night. Has been having yellow phlegm that started today.  No fevers/chills.no nasal congestion/headaches/sore throat.  Does have history of asthma and using inhaler as needed.        Review of Systems  Constitutional, HEENT, cardiovascular, pulmonary, gi and gu systems are negative, except as otherwise noted.      Objective           Vitals:  No vitals were obtained today due to virtual visit.    Physical Exam   GENERAL: alert and no distress  EYES: Eyes grossly normal to inspection.  No discharge or erythema, or obvious scleral/conjunctival abnormalities.  RESP: No " audible wheeze, cough, or visible cyanosis.    SKIN: Visible skin clear. No significant rash, abnormal pigmentation or lesions.  NEURO: Cranial nerves grossly intact.  Mentation and speech appropriate for age.  PSYCH: Appropriate affect, tone, and pace of words          Video-Visit Details    Type of service:  Video Visit   Originating Location (pt. Location): Home    Distant Location (provider location):  On-site  Platform used for Video Visit: Yobani  Signed Electronically by: Lucina Forde MD

## 2025-03-31 ENCOUNTER — TELEPHONE (OUTPATIENT)
Dept: INTERNAL MEDICINE | Facility: CLINIC | Age: 35
End: 2025-03-31
Payer: COMMERCIAL

## 2025-03-31 NOTE — TELEPHONE ENCOUNTER
Patient Quality Outreach    Patient is due for the following:   Physical Annual Wellness Visit    Action(s) Taken:   Schedule a Annual Wellness Visit    Type of outreach:    Sent letter.    Questions for provider review:    None         Damon Mckeon MA  Chart routed to None.

## 2025-03-31 NOTE — LETTER
March 31, 2025    To  Tessa Silverio  [address] [address] [facility] [address]    Your team at [facility] cares about your health. We have reviewed your chart and based on our findings; we are making the following recommendations to better manage your health.     You are in particular need of attention regarding the following:     PREVENTATIVE VISIT: Physical    If you have already completed these items, please contact the clinic via phone or   MyChart so your care team can review and update your records. Thank you for   choosing [facility] for your healthcare needs. For any questions,   concerns, or to schedule an appointment please contact our clinic.    Healthy Regards,      Your [facility] Care Team            Electronically signed

## 2025-04-13 ENCOUNTER — HEALTH MAINTENANCE LETTER (OUTPATIENT)
Age: 35
End: 2025-04-13